# Patient Record
Sex: MALE | Race: WHITE | Employment: FULL TIME | URBAN - METROPOLITAN AREA
[De-identification: names, ages, dates, MRNs, and addresses within clinical notes are randomized per-mention and may not be internally consistent; named-entity substitution may affect disease eponyms.]

---

## 2018-04-05 PROBLEM — E66.01 OBESITY, MORBID (HCC): Status: ACTIVE | Noted: 2018-04-05

## 2018-09-19 ENCOUNTER — HOSPITAL ENCOUNTER (OUTPATIENT)
Dept: PHYSICAL THERAPY | Age: 58
Discharge: HOME | End: 2018-09-19
Payer: COMMERCIAL

## 2018-09-19 PROCEDURE — 97110 THERAPEUTIC EXERCISES: CPT | Performed by: PHYSICAL THERAPIST

## 2018-09-19 PROCEDURE — 97161 PT EVAL LOW COMPLEX 20 MIN: CPT | Performed by: PHYSICAL THERAPIST

## 2018-09-19 NOTE — PROGRESS NOTES
Ambulatory/Rehab Services H2 Model Falls Risk Assessment    Risk Factor Pts. ·   Confusion/Disorientation/Impulsivity  []    4 ·   Symptomatic Depression  []   2 ·   Altered Elimination  []   1 ·   Dizziness/Vertigo  []   1 ·   Gender (Male)  [x]   1 ·   Any administered antiepileptics (anticonvulsants):  []   2 ·   Any administered benzodiazepines:  []   1 ·   Visual Impairment (specify):  []   1 ·   Portable Oxygen Use  []   1 ·   Orthostatic ? BP  []   1 ·   History of Recent Falls (within 3 mos.)  []   5     Ability to Rise from Chair (choose one) Pts. ·   Ability to rise in a single movement  []   0 ·   Pushes up, successful in one attempt  [x]   1 ·   Multiple attempts, but successful  []   3 ·   Unable to rise without assistance  []   4   Total: (5 or greater = High Risk) 2     Falls Prevention Plan:   []                Physical Limitations to Exercise (specify):   []                Mobility Assistance Device (type):   []                Exercise/Equipment Adaptation (specify):    ©2010 [facility] [address] Patent #[id number].  Federal Law prohibits the replication, distribution or use without written permission from [facility] [employer]

## 2018-09-19 NOTE — THERAPY EVALUATION
Cas Carlin  : 1960  Primary: [address]  Secondary:  [facility] [place] [address], [address], [place] [address]  [phone]   [phone]         OUTPATIENT PHYSICAL THERAPY:Initial Assessment and Daily Note 2018    ICD-10: Treatment Diagnosis: (M75.02) L shoulder adhesive capsulitis; (M 25.512) L shoulder pain; (M25.612) L shoulder stiffness; (M62.81) muscle weakness  Precautions/Allergies:   Review of patient's allergies indicates no known allergies. Fall Risk Score: 2 (? 5 = High Risk)  MD Orders: Evaluate and Treat for L shoulder adhesive capsulitis MEDICAL/REFERRING DIAGNOSIS:  shoulder (M75.02)  DATE OF ONSET: 18  REFERRING PHYSICIAN: Murray Celis MD  RETURN PHYSICIAN APPOINTMENT: 10-18-18     INITIAL ASSESSMENT:  Mr. Katja Kruse presents today with s/s consistent with the diagnosis of L shoulder adhesive capsulitis including L shoulder/upper arm pain, as well as, decreased L shoulder P/AA/AROM and strength. Pt scored a 43% disability rating on the quick dash. Pt will benefit from skilled PT to address the following deficits. PROBLEM LIST (Impacting functional limitations):  1. Decreased Strength  2. Decreased ADL/Functional Activities  3. Increased Pain  4. Decreased Activity Tolerance  5. Decreased Flexibility/Joint Mobility  6. Decreased Knowledge of Precautions  7. Decreased [place] with Home Exercise Program INTERVENTIONS PLANNED:  1. Cold  2. Heat  3. Home Exercise Program (HEP)  4. Manual Therapy  5. Neuromuscular Re-education/Strengthening  6. Range of Motion (ROM)  7. Therapeutic Activites  8. Therapeutic Exercise/Strengthening  9. Ultrasound (US)  10. Kinesiotaping   TREATMENT PLAN:  Effective Dates: 2018 TO 2018 (60 days).  Frequency/Duration: 1 time a week for 60 Days  GOALS: (Goals have been discussed and agreed upon with patient.)  Short-Term Functional Goals: Time Frame: 4 weeks (10-17-18)  1. Pt will be compliant and independent with HEP. 2. Pt will improve Quick Dash score to <35 % disability to increase pt's overall functional mobility. 3. Pt to subjectively report a decrease in pain to 6/10 @ worst to increase pt's ease with ADLs. 4. Pt will improve standing AROM of L  shoulder to >Flex/Scapt: 130deg; ABD:110deg; ER in N:30deg; ER in ABD:50deg; and IR:L5/sacruum to improve pt's ability to reach behind back, overhead and perform ADLs such as dressing without modifications. 5. Pt will be able to sleep with decreased frequency (3-4x/night) of waking due to  L shoulder pain. 6. Pt will increase MMT to 3+/5 throughout L shoulder and 4+/5 for elbow in order to perform house hold and work related tasks with greater ease. Discharge Goals: Time Frame: 8 weeks (11-14-18)  1. Pt will improve Quick Dash score to < 30 % disability to increase pt's overall functional mobility. 2. Pt will subjectively report greater ease with tucking in his shirt and putting on a belt. 3. Pt will subjectively report a decrease in pain to 4-5/10 @ worst to allow pt greater ease with reaching overhead. 4. Pt will subjectively report waking only 2x/night due to L shoulder pain. 5. Pt will improve standing AROM of L shoulder to > Flex/Scapt: 135deg; ABD: 120deg; ER in N: 35deg; ER in ABD: 60deg and IR: L3 to increase pt's overall functional mobility. 6. Pt will increase MMT to 4/5 throughout L shoulder to allow pt to return to PLOF with manageable L shoulder pain. 7. Pt will be discharged from PT to HEP. Rehabilitation Potential For Stated Goals: Good  Regarding [facility] therapy, I certify that the treatment plan above will be carried out by a therapist or under their direction.   Thank you for this referral,  Keyana Tomlinson, PT     Referring Physician Signature: Frantz Wilkins MD              Date                    The information in this section was collected on 9/19/18 (except where otherwise noted). HISTORY:   History of Present Injury/Illness (Reason for Referral):  Pt reports ~ 6 weeks ago, he began having increased pain and stiffness in his L shoulder with no known cause. He reports waking 5-6x/night due to pain and throbbing. He states difficulty with ADLs such as bathing and dressing and house hold/work activities. He reports now his L UE feels weak. He reports pain at rest is a 2/10, @ night is a 3-4/10, and is a 6-7/10 at end range when tring to use his L UE. He reports taking Motrin/Aleve prn for pain. He denies having a steroid injection at this point. He would like to decrease his pain and improve his strength and mobility. Past Medical History/Comorbidities:   Mr. Loren Delcid  has a past medical history of Anxiety and depression; Awareness under anesthesia (1980s); Back pain; Bulging lumbar disc; Essential hypertension, benign; H/O colonoscopy (2018); High cholesterol; Morbid obesity ([facility] [address] (5/31/16); Murmur, cardiac; Other and unspecified hyperlipidemia; and Psychiatric disorder. Mr. Loren Delcid  has a past surgical history that includes hx hernia repair; hx hernia repair (Bilateral); hx orthopaedic (Right, 2002); hx orthopaedic; pr neurological procedure unlisted; and hx other surgical.  Social History/Living Environment:     Pt reports he lives with his wife in a 2rd story apartment. Prior Level of Function/Work/Activity:  Pt reports he is a , but states he's currently not working due to his L UE pain and ROM/strength restrictions. Dominant Side:         RIGHT  Current Medications:       Current Outpatient Prescriptions:     amLODIPine (NORVASC) 5 mg tablet, Take 1 Tab by mouth daily.  Indications: hypertension, Disp: 90 Tab, Rfl: 3    irbesartan (AVAPRO) 150 mg tablet, Take 1 Tab by mouth nightly., Disp: 90 Tab, Rfl: 3    ALPRAZolam (XANAX) 1 mg tablet, TAKE 1 TABLET BY MOUTH 3 TIMES A DAY, Disp: , Rfl: 0    ARIPiprazole (ABILIFY) 5 mg tablet, TAKE 1 TABLET BY MOUTH EVERY MORNING, Disp: , Rfl: 0    QUEtiapine (SEROQUEL) 50 mg tablet, TAKE 1 TO 2 TABLETS BY MOUTH AT BEDTIME, Disp: , Rfl: 0    escitalopram oxalate (LEXAPRO) 20 mg tablet, Take 1 Tab by mouth daily. , Disp: 90 Tab, Rfl: 3    aspirin delayed-release 81 mg tablet, Take 81 mg by mouth every morning. Last dose 5/31/16, Disp: , Rfl:    Date Last Reviewed:  9/19/18    Number of Personal Factors/Comorbidities that affect the Plan of Care:  0: LOW COMPLEXITY   EXAMINATION:   Observation/Orthostatic Postural Assessment:          Pt sits/stands with forward head and rounded shoulders. No obvious guarding noted until after AROM. Palpation:          Pt is minimally tender with palpation at anterior L shoulder at bicipital groove, but none noted with palpation along RC musculature.    Gait/Armswing: Normal and equal B  Atrophy: None noted    Standing AROM/Supine PROM:    Shoulder ROM  DATE  9/19/18 DATE     Flexion R:   L: 125deg R:   L:    Scaption R:  L:110deg R:  L:   ABD R:   L: 95deg R:   L:    ER (N / 90 ABD) R:   L: 25deg/45deg R:   L:    IR R:   L: L side iliac crest R:   L:    Flexibility PC R:   L: moderately restricted R:   L:      Strength Date:  9/19/18 Date:   Date:     Shoulder Parameters Parameters Parameters   Scapular Control (Ecc lowering) R:  L:within available ROM     Shoulder Flex/Scapt R:  L:3/5 in midrange     Shoulder ABD R:  L:3/5 in midrange     Shoulder ER R:  L:3/5 in midrange     Shoulder IR R:  L:3/5 in midrange     Elbow Flex R:  L:4/5     Elbow Ext R:  L:3+/5         Myotomes: Normal and equal B throughout  Diaphragm (C4):  Deltoid/Biceps (C5):  Wrist Extensors (C6):  Triceps (C7):  Flexor Profundus (C8):    Sensation: Normal and equal B throughout  Biceps (C5):  Palmar Radial (C6-C7):  Palmar Ulnar (C8-T1):    Special Test/Function:  Cervical Clearing: WFL; appropriate and equal throughout  Spurling's maneuver: negative B  Impingement Test: minimally positive L  Empty Can Test: positive due to pain and inability to get into appropriate position, rather than a true positive  Speeds Test: negative  Drop Arm Test:negative        Body Structures Involved:  1. Nerves  2. Bones  3. Joints  4. Muscles  5. Ligaments Body Functions Affected:  1. Sensory/Pain  2. Neuromusculoskeletal  3. Movement Related  Activities and Participation Affected:  1. Mobility  2. Self Care  3. Domestic Life  4. Interpersonal Interactions and Relationships  5. Community, Social and [place]  6. Work    Number of elements (examined above) that affect the Plan of Care:  3: MODERATE COMPLEXITY   CLINICAL PRESENTATION:    Presentation:  Stable and uncomplicated: LOW COMPLEXITY   CLINICAL DECISION MAKING:   Outcome Measure: Tool Used: Disabilities of the Arm, Shoulder and Hand (DASH) Questionnaire - Quick Version  Score:  Initial: 30/55=43% disability Most Recent: X/55 (Date: -- )   Interpretation of Score: The DASH is designed to measure the activities of daily living in person's with upper extremity dysfunction or pain. Each section is scored on a 1-5 scale, 5 representing the greatest disability. The scores of each section are added together for a total score of 55. Score 11 12-19 20-28 29-37 38-45 46-54 55   Modifier CH CI CJ CK CL CM CN        Medical Necessity:   · Patient is expected to demonstrate progress in strength, range of motion, balance, coordination and functional technique to decrease L shoulder/arm pain and allow him greater ease with ADLs/household/work activities. .     Use of outcome tool(s) and clinical judgement create a POC that gives a: Clear prediction of patient's progress: LOW COMPLEXITY            TREATMENT:   (In addition to Assessment/Re-Assessment sessions the following treatments were rendered)  Pre-treatment Symptoms/Complaints:  Pt c/o increased L shoulder/upper arm pain, stiffness, weakness and immobility.   Pain: Initial:     2-3/10 @ rest; 6-7/10 @ end AROM Post Session:  2-3/10 THERAPEUTIC EXERCISE: (25 minutes):  Exercises per grid below to improve mobility, strength, balance and coordination. Required moderate visual, verbal, manual and tactile cues to promote proper body alignment, promote proper body posture, promote proper body mechanics and promote proper body breathing techniques. Progressed resistance, range, repetitions and complexity of movement as indicated. MANUAL THERAPY: (0 minutes): Joint mobilization and Soft tissue mobilization was utilized and necessary because of the patient's restricted joint motion, painful spasm, loss of articular motion and restricted motion of soft tissue. Re-assessment was performed today (see above assessment section). Separate time was dedicated today for this re-assessment. 20 minutes   Date:  9/19/18 Date:   Date:     Activity/Exercise Parameters Parameters Parameters   Table slides into flex/scapt 10x 5sec hold     Table slides into ABD 10x 5 sec hold     Table slides into ER 10x 5sec hold     IR towel stretch across back 10x 5sec hold     IR towel stretch up back 10x 5sec hold           Education HEP/posture/  NSAIDS  Heat before  Ice after           Treatment/Session Assessment:  See above    · Response to Treatment:  Increased c/o pain at end range  · Compliance with Program/Exercises: Will assess as treatment progresses. Recommendations/Intent for next treatment session: \"Next visit will focus on advancements to more challenging activities and reduction in assistance provided\".     Future Appointments  Date Time Provider Hadley Myers   9/26/2018 10:30 AM Laura Clemente PT [facility]   10/3/2018 10:30 AM Laura Clemente PT [facility]   10/8/2018 4:30 PM Ashwin Fields MD [facility]   10/10/2018 10:30 AM Hero Joseph PT [facility]         Total Treatment Duration: 45 minutes  PT Patient Time In/Time Out  Time In: 0920  Time Out: 083 [facility],

## 2018-09-26 ENCOUNTER — HOSPITAL ENCOUNTER (OUTPATIENT)
Dept: PHYSICAL THERAPY | Age: 58
Discharge: HOME | End: 2018-09-26
Payer: COMMERCIAL

## 2018-09-26 PROCEDURE — 97110 THERAPEUTIC EXERCISES: CPT | Performed by: PHYSICAL THERAPIST

## 2018-09-26 NOTE — PROGRESS NOTES
Lico Naqvi  : 1960  Primary: [address]  Secondary:  [facility] [place] [address], [address], [place] [address]  [phone]   [phone]         OUTPATIENT PHYSICAL THERAPY:Daily Note 2018    ICD-10: Treatment Diagnosis: (M75.02) L shoulder adhesive capsulitis; (M 25.512) L shoulder pain; (M25.612) L shoulder stiffness; (M62.81) muscle weakness  Precautions/Allergies:   Review of patient's allergies indicates no known allergies. Fall Risk Score: 2 (? 5 = High Risk)  MD Orders: Evaluate and Treat for L shoulder adhesive capsulitis MEDICAL/REFERRING DIAGNOSIS:  shoulder (M75.02)  DATE OF ONSET: 18  REFERRING PHYSICIAN: Jessenia Freedman MD  RETURN PHYSICIAN APPOINTMENT: 10-18-18     INITIAL ASSESSMENT:  Mr. Lalo Reeves presents today with s/s consistent with the diagnosis of L shoulder adhesive capsulitis including L shoulder/upper arm pain, as well as, decreased L shoulder P/AA/AROM and strength. Pt scored a 43% disability rating on the quick dash. Pt will benefit from skilled PT to address the following deficits. PROBLEM LIST (Impacting functional limitations):  1. Decreased Strength  2. Decreased ADL/Functional Activities  3. Increased Pain  4. Decreased Activity Tolerance  5. Decreased Flexibility/Joint Mobility  6. Decreased Knowledge of Precautions  7. Decreased [place] with Home Exercise Program INTERVENTIONS PLANNED:  1. Cold  2. Heat  3. Home Exercise Program (HEP)  4. Manual Therapy  5. Neuromuscular Re-education/Strengthening  6. Range of Motion (ROM)  7. Therapeutic Activites  8. Therapeutic Exercise/Strengthening  9. Ultrasound (US)  10. Kinesiotaping   TREATMENT PLAN:  Effective Dates: 2018 TO 2018 (60 days). Frequency/Duration: 1 time a week for 60 Days  GOALS: (Goals have been discussed and agreed upon with patient.)  Short-Term Functional Goals: Time Frame: 4 weeks (10-17-18)  1.  Pt will be compliant and independent with HEP. 2. Pt will improve Quick Dash score to <35 % disability to increase pt's overall functional mobility. 3. Pt to subjectively report a decrease in pain to 6/10 @ worst to increase pt's ease with ADLs. 4. Pt will improve standing AROM of L  shoulder to >Flex/Scapt: 130deg; ABD:110deg; ER in N:30deg; ER in ABD:50deg; and IR:L5/sacruum to improve pt's ability to reach behind back, overhead and perform ADLs such as dressing without modifications. 5. Pt will be able to sleep with decreased frequency (3-4x/night) of waking due to  L shoulder pain. 6. Pt will increase MMT to 3+/5 throughout L shoulder and 4+/5 for elbow in order to perform house hold and work related tasks with greater ease. Discharge Goals: Time Frame: 8 weeks (11-14-18)  1. Pt will improve Quick Dash score to < 30 % disability to increase pt's overall functional mobility. 2. Pt will subjectively report greater ease with tucking in his shirt and putting on a belt. 3. Pt will subjectively report a decrease in pain to 4-5/10 @ worst to allow pt greater ease with reaching overhead. 4. Pt will subjectively report waking only 2x/night due to L shoulder pain. 5. Pt will improve standing AROM of L shoulder to > Flex/Scapt: 135deg; ABD: 120deg; ER in N: 35deg; ER in ABD: 60deg and IR: L3 to increase pt's overall functional mobility. 6. Pt will increase MMT to 4/5 throughout L shoulder to allow pt to return to PLOF with manageable L shoulder pain. 7. Pt will be discharged from PT to HEP. Rehabilitation Potential For Stated Goals: Good  Regarding Matt Taylorshweta therapy, I certify that the treatment plan above will be carried out by a therapist or under their direction. Thank you for this referral,  Dominick Ding PT                 The information in this section was collected on 9/19/18 (except where otherwise noted).   HISTORY:   History of Present Injury/Illness (Reason for Referral):  Pt reports ~ 6 weeks ago, he began having increased pain and stiffness in his L shoulder with no known cause. He reports waking 5-6x/night due to pain and throbbing. He states difficulty with ADLs such as bathing and dressing and house hold/work activities. He reports now his L UE feels weak. He reports pain at rest is a 2/10, @ night is a 3-4/10, and is a 6-7/10 at end range when tring to use his L UE. He reports taking Motrin/Aleve prn for pain. He denies having a steroid injection at this point. He would like to decrease his pain and improve his strength and mobility. Past Medical History/Comorbidities:   Mr. Nahomi Wesley  has a past medical history of Anxiety and depression; Awareness under anesthesia (1980s); Back pain; Bulging lumbar disc; Essential hypertension, benign; H/O colonoscopy (2018); High cholesterol; Morbid obesity ([facility] [address] (5/31/16); Murmur, cardiac; Other and unspecified hyperlipidemia; and Psychiatric disorder. Mr. Nahomi Wesley  has a past surgical history that includes hx hernia repair; hx hernia repair (Bilateral); hx orthopaedic (Right, 2002); hx orthopaedic; pr neurological procedure unlisted; and hx other surgical.  Social History/Living Environment:     Pt reports he lives with his wife in a 2rd story apartment. Prior Level of Function/Work/Activity:  Pt reports he is a , but states he's currently not working due to his L UE pain and ROM/strength restrictions. Dominant Side:         RIGHT  Current Medications:       Current Outpatient Prescriptions:     amLODIPine (NORVASC) 5 mg tablet, Take 1 Tab by mouth daily.  Indications: hypertension, Disp: 90 Tab, Rfl: 3    irbesartan (AVAPRO) 150 mg tablet, Take 1 Tab by mouth nightly., Disp: 90 Tab, Rfl: 3    ALPRAZolam (XANAX) 1 mg tablet, TAKE 1 TABLET BY MOUTH 3 TIMES A DAY, Disp: , Rfl: 0    ARIPiprazole (ABILIFY) 5 mg tablet, TAKE 1 TABLET BY MOUTH EVERY MORNING, Disp: , Rfl: 0    QUEtiapine (SEROQUEL) 50 mg tablet, TAKE 1 TO 2 TABLETS BY MOUTH AT BEDTIME, Disp: , Rfl: 0    escitalopram oxalate (LEXAPRO) 20 mg tablet, Take 1 Tab by mouth daily. , Disp: 90 Tab, Rfl: 3    aspirin delayed-release 81 mg tablet, Take 81 mg by mouth every morning. Last dose 5/31/16, Disp: , Rfl:    Date Last Reviewed:  9/19/18   EXAMINATION:   Observation/Orthostatic Postural Assessment:          Pt sits/stands with forward head and rounded shoulders. No obvious guarding noted until after AROM. Palpation:          Pt is minimally tender with palpation at anterior L shoulder at bicipital groove, but none noted with palpation along RC musculature.    Gait/Armswing: Normal and equal B  Atrophy: None noted    Standing AROM/Supine PROM:    Shoulder ROM  DATE  9/19/18 DATE     Flexion R:   L: 125deg R:   L:    Scaption R:  L:110deg R:  L:   ABD R:   L: 95deg R:   L:    ER (N / 90 ABD) R:   L: 25deg/45deg R:   L:    IR R:   L: L side iliac crest R:   L:    Flexibility PC R:   L: moderately restricted R:   L:      Strength Date:  9/19/18 Date:   Date:     Shoulder Parameters Parameters Parameters   Scapular Control (Ecc lowering) R:  L:within available ROM     Shoulder Flex/Scapt R:  L:3/5 in midrange     Shoulder ABD R:  L:3/5 in midrange     Shoulder ER R:  L:3/5 in midrange     Shoulder IR R:  L:3/5 in midrange     Elbow Flex R:  L:4/5     Elbow Ext R:  L:3+/5         Myotomes: Normal and equal B throughout  Diaphragm (C4):  Deltoid/Biceps (C5):  Wrist Extensors (C6):  Triceps (C7):  Flexor Profundus (C8):    Sensation: Normal and equal B throughout  Biceps (C5):  Palmar Radial (C6-C7):  Palmar Ulnar (C8-T1):    Special Test/Function:  Cervical Clearing: WFL; appropriate and equal throughout  Spurling's maneuver: negative B  Impingement Test: minimally positive L  Empty Can Test: positive due to pain and inability to get into appropriate position, rather than a true positive  Speeds Test: negative  Drop Arm Test:negative        Body Structures Involved:  1. Nerves  2. Bones  3. Joints  4. Muscles  5. Ligaments Body Functions Affected:  1. Sensory/Pain  2. Neuromusculoskeletal  3. Movement Related  Activities and Participation Affected:  1. Mobility  2. Self Care  3. Domestic Life  4. Interpersonal Interactions and Relationships  5. Community, Social and [place]  6. Work   CLINICAL PRESENTATION:   CLINICAL DECISION MAKING:   Outcome Measure: Tool Used: Disabilities of the Arm, Shoulder and Hand (DASH) Questionnaire - Quick Version  Score:  Initial: 30/55=43% disability Most Recent: X/55 (Date: -- )   Interpretation of Score: The DASH is designed to measure the activities of daily living in person's with upper extremity dysfunction or pain. Each section is scored on a 1-5 scale, 5 representing the greatest disability. The scores of each section are added together for a total score of 55. Score 11 12-19 20-28 29-37 38-45 46-54 55   Modifier CH CI CJ CK CL CM CN        Medical Necessity:   · Patient is expected to demonstrate progress in strength, range of motion, balance, coordination and functional technique to decrease L shoulder/arm pain and allow him greater ease with ADLs/household/work activities. .            TREATMENT:   (In addition to Assessment/Re-Assessment sessions the following treatments were rendered)  Pre-treatment Symptoms/Complaints:  Pt reports L shoulder/arm pain and stiffness continues, however, states he does have more mobility now that he's been doing exercises for a week. He reports night pain continues, making sleeping difficult. Pain: Initial:     2/10 @ rest; 6/10 @ end AROM Post Session:  3/10     THERAPEUTIC EXERCISE: (50 minutes):  Exercises per grid below to improve mobility, strength, balance and coordination. Required moderate visual, verbal, manual and tactile cues to promote proper body alignment, promote proper body posture, promote proper body mechanics and promote proper body breathing techniques. Progressed resistance, range, repetitions and complexity of movement as indicated. MANUAL THERAPY: (0 minutes): Joint mobilization and Soft tissue mobilization was utilized and necessary because of the patient's restricted joint motion, painful spasm, loss of articular motion and restricted motion of soft tissue. Date:  9/19/18 Date:  9/26/18 Date:     Activity/Exercise Parameters Parameters Parameters   Table slides into flex/scapt 10x 5sec hold Review    Table slides into ABD 10x 5 sec hold Review    Table slides into ER 10x 5sec hold Review    IR towel stretch across back 10x 5sec hold Review    IR towel stretch up back 10x 5sec hold Review                      Stdg wand ext  1#, 20x    Stdg wand ABD  1#, 20x    Stdg wand flex  1#, 20x    Seated pulleys into ABD  20x    Seated pulleys into flex/scapt  20x each    Stdg pulleys into IR  20x    stdg pulleys into flex  20x    UBE  Res 2, 8min (4/4)    Education HEP/posture/  NSAIDS  Heat before  Ice after HEP/posture  NSAIDS          Treatment/Session Assessment: Pt's L shoulder P/AA/AROM is slowly improving. Pt continues to have a painful arc at 90deg flex/scapt/abd, but capsular mobility is improving. · Response to Treatment:  Pt with increased L shoulder/ and upper arm soreness during and after exercises, however, he was able to participate fully despite the pain. · Compliance with Program/Exercises: Pt reports compliance with HEP, ICE and NSAIDS 2x daily. Recommendations/Intent for next treatment session: \"Next visit will focus on advancements to more challenging activities and reduction in assistance provided\". Try RC/deltoid strengthening next visit per MD order.     Future Appointments  Date Time Provider Hadley Myers   10/3/2018 10:30 AM JASMINA Natar[facility]   10/8/2018 4:30 PM MD NETTA Jaramillo [facility]   10/10/2018 10:30 AM Faraz Duncan PT [facility]         Total Treatment Duration: 50 minutes  PT Patient Time In/Time Out  Time In: 1030  Time Out: 1125 Nimisha Oneill, PT

## 2018-10-03 ENCOUNTER — HOSPITAL ENCOUNTER (OUTPATIENT)
Dept: PHYSICAL THERAPY | Age: 58
Discharge: HOME | End: 2018-10-03
Payer: COMMERCIAL

## 2018-10-03 PROCEDURE — 97110 THERAPEUTIC EXERCISES: CPT | Performed by: PHYSICAL THERAPIST

## 2018-10-03 NOTE — PROGRESS NOTES
Nancie Jaramillo  : 1960  Primary: [address]  Secondary:  [facility] [address] [place] [address], [address], [place] [address]  [phone]   [phone]         OUTPATIENT PHYSICAL THERAPY:Daily Note 10/3/2018    ICD-10: Treatment Diagnosis: (M75.02) L shoulder adhesive capsulitis; (M 25.512) L shoulder pain; (M25.612) L shoulder stiffness; (M62.81) muscle weakness  Precautions/Allergies:   Review of patient's allergies indicates no known allergies. Fall Risk Score: 2 (? 5 = High Risk)  MD Orders: Evaluate and Treat for L shoulder adhesive capsulitis MEDICAL/REFERRING DIAGNOSIS:  shoulder (M75.02)  DATE OF ONSET: 18  REFERRING PHYSICIAN: Nando Mg MD  RETURN PHYSICIAN APPOINTMENT: 10-18-18     INITIAL ASSESSMENT:  Mr. Adalgisa Antoine presents today with s/s consistent with the diagnosis of L shoulder adhesive capsulitis including L shoulder/upper arm pain, as well as, decreased L shoulder P/AA/AROM and strength. Pt scored a 43% disability rating on the quick dash. Pt will benefit from skilled PT to address the following deficits. PROBLEM LIST (Impacting functional limitations):  1. Decreased Strength  2. Decreased ADL/Functional Activities  3. Increased Pain  4. Decreased Activity Tolerance  5. Decreased Flexibility/Joint Mobility  6. Decreased Knowledge of Precautions  7. Decreased [place] with Home Exercise Program INTERVENTIONS PLANNED:  1. Cold  2. Heat  3. Home Exercise Program (HEP)  4. Manual Therapy  5. Neuromuscular Re-education/Strengthening  6. Range of Motion (ROM)  7. Therapeutic Activites  8. Therapeutic Exercise/Strengthening  9. Ultrasound (US)  10. Kinesiotaping   TREATMENT PLAN:  Effective Dates: 2018 TO 2018 (60 days). Frequency/Duration: 1 time a week for 60 Days  GOALS: (Goals have been discussed and agreed upon with patient.)  Short-Term Functional Goals: Time Frame: 4 weeks (10-17-18)  1.  Pt will be compliant and independent with HEP. 2. Pt will improve Quick Dash score to <35 % disability to increase pt's overall functional mobility. 3. Pt to subjectively report a decrease in pain to 6/10 @ worst to increase pt's ease with ADLs. 4. Pt will improve standing AROM of L  shoulder to >Flex/Scapt: 130deg; ABD:110deg; ER in N:30deg; ER in ABD:50deg; and IR:L5/sacruum to improve pt's ability to reach behind back, overhead and perform ADLs such as dressing without modifications. 5. Pt will be able to sleep with decreased frequency (3-4x/night) of waking due to  L shoulder pain. 6. Pt will increase MMT to 3+/5 throughout L shoulder and 4+/5 for elbow in order to perform house hold and work related tasks with greater ease. Discharge Goals: Time Frame: 8 weeks (11-14-18)  1. Pt will improve Quick Dash score to < 30 % disability to increase pt's overall functional mobility. 2. Pt will subjectively report greater ease with tucking in his shirt and putting on a belt. 3. Pt will subjectively report a decrease in pain to 4-5/10 @ worst to allow pt greater ease with reaching overhead. 4. Pt will subjectively report waking only 2x/night due to L shoulder pain. 5. Pt will improve standing AROM of L shoulder to > Flex/Scapt: 135deg; ABD: 120deg; ER in N: 35deg; ER in ABD: 60deg and IR: L3 to increase pt's overall functional mobility. 6. Pt will increase MMT to 4/5 throughout L shoulder to allow pt to return to PLOF with manageable L shoulder pain. 7. Pt will be discharged from PT to HEP. Rehabilitation Potential For Stated Goals: Good  Regarding Viviana Coupe therapy, I certify that the treatment plan above will be carried out by a therapist or under their direction. Thank you for this referral,  Darnell Faith PT                 The information in this section was collected on 9/19/18 (except where otherwise noted).   HISTORY:   History of Present Injury/Illness (Reason for Referral):  Pt reports ~ 6 weeks ago, he began having increased pain and stiffness in his L shoulder with no known cause. He reports waking 5-6x/night due to pain and throbbing. He states difficulty with ADLs such as bathing and dressing and house hold/work activities. He reports now his L UE feels weak. He reports pain at rest is a 2/10, @ night is a 3-4/10, and is a 6-7/10 at end range when tring to use his L UE. He reports taking Motrin/Aleve prn for pain. He denies having a steroid injection at this point. He would like to decrease his pain and improve his strength and mobility. Past Medical History/Comorbidities:   Mr. Antonio Jay  has a past medical history of Anxiety and depression; Awareness under anesthesia (1980s); Back pain; Bulging lumbar disc; Essential hypertension, benign; H/O colonoscopy (2018); High cholesterol; Morbid obesity ([facility] [address] (5/31/16); Murmur, cardiac; Other and unspecified hyperlipidemia; and Psychiatric disorder. Mr. Antonio Jay  has a past surgical history that includes hx hernia repair; hx hernia repair (Bilateral); hx orthopaedic (Right, 2002); hx orthopaedic; pr neurological procedure unlisted; and hx other surgical.  Social History/Living Environment:     Pt reports he lives with his wife in a 2rd story apartment. Prior Level of Function/Work/Activity:  Pt reports he is a , but states he's currently not working due to his L UE pain and ROM/strength restrictions. Dominant Side:         RIGHT  Current Medications:       Current Outpatient Prescriptions:     amLODIPine (NORVASC) 5 mg tablet, Take 1 Tab by mouth daily.  Indications: hypertension, Disp: 90 Tab, Rfl: 3    irbesartan (AVAPRO) 150 mg tablet, Take 1 Tab by mouth nightly., Disp: 90 Tab, Rfl: 3    ALPRAZolam (XANAX) 1 mg tablet, TAKE 1 TABLET BY MOUTH 3 TIMES A DAY, Disp: , Rfl: 0    ARIPiprazole (ABILIFY) 5 mg tablet, TAKE 1 TABLET BY MOUTH EVERY MORNING, Disp: , Rfl: 0    QUEtiapine (SEROQUEL) 50 mg tablet, TAKE 1 TO 2 TABLETS BY MOUTH AT BEDTIME, Disp: , Rfl: 0    escitalopram oxalate (LEXAPRO) 20 mg tablet, Take 1 Tab by mouth daily. , Disp: 90 Tab, Rfl: 3    aspirin delayed-release 81 mg tablet, Take 81 mg by mouth every morning. Last dose 5/31/16, Disp: , Rfl:    Date Last Reviewed:  9/19/18   EXAMINATION:   Observation/Orthostatic Postural Assessment:          Pt sits/stands with forward head and rounded shoulders. No obvious guarding noted until after AROM. Palpation:          Pt is minimally tender with palpation at anterior L shoulder at bicipital groove, but none noted with palpation along RC musculature.    Gait/Armswing: Normal and equal B  Atrophy: None noted    Standing AROM/Supine PROM:    Shoulder ROM  DATE  9/19/18 DATE     Flexion R:   L: 125deg R:   L:    Scaption R:  L:110deg R:  L:   ABD R:   L: 95deg R:   L:    ER (N / 90 ABD) R:   L: 25deg/45deg R:   L:    IR R:   L: L side iliac crest R:   L:    Flexibility PC R:   L: moderately restricted R:   L:      Strength Date:  9/19/18 Date:   Date:     Shoulder Parameters Parameters Parameters   Scapular Control (Ecc lowering) R:  L:within available ROM     Shoulder Flex/Scapt R:  L:3/5 in midrange     Shoulder ABD R:  L:3/5 in midrange     Shoulder ER R:  L:3/5 in midrange     Shoulder IR R:  L:3/5 in midrange     Elbow Flex R:  L:4/5     Elbow Ext R:  L:3+/5         Myotomes: Normal and equal B throughout  Diaphragm (C4):  Deltoid/Biceps (C5):  Wrist Extensors (C6):  Triceps (C7):  Flexor Profundus (C8):    Sensation: Normal and equal B throughout  Biceps (C5):  Palmar Radial (C6-C7):  Palmar Ulnar (C8-T1):    Special Test/Function:  Cervical Clearing: WFL; appropriate and equal throughout  Spurling's maneuver: negative B  Impingement Test: minimally positive L  Empty Can Test: positive due to pain and inability to get into appropriate position, rather than a true positive  Speeds Test: negative  Drop Arm Test:negative        Body Structures Involved:  1. Nerves  2. Bones  3. Joints  4. Muscles  5. Ligaments Body Functions Affected:  1. Sensory/Pain  2. Neuromusculoskeletal  3. Movement Related  Activities and Participation Affected:  1. Mobility  2. Self Care  3. Domestic Life  4. Interpersonal Interactions and Relationships  5. Community, Social and [place]  6. Work   CLINICAL PRESENTATION:   CLINICAL DECISION MAKING:   Outcome Measure: Tool Used: Disabilities of the Arm, Shoulder and Hand (DASH) Questionnaire - Quick Version  Score:  Initial: 30/55=43% disability Most Recent: X/55 (Date: -- )   Interpretation of Score: The DASH is designed to measure the activities of daily living in person's with upper extremity dysfunction or pain. Each section is scored on a 1-5 scale, 5 representing the greatest disability. The scores of each section are added together for a total score of 55. Score 11 12-19 20-28 29-37 38-45 46-54 55   Modifier CH CI CJ CK CL CM CN        Medical Necessity:   · Patient is expected to demonstrate progress in strength, range of motion, balance, coordination and functional technique to decrease L shoulder/arm pain and allow him greater ease with ADLs/household/work activities. .            TREATMENT:   (In addition to Assessment/Re-Assessment sessions the following treatments were rendered)  Pre-treatment Symptoms/Complaints:  Pt reports increased L shoulder soreness/pain today. He voiced frustration of continued L shoulder pain and his lack of sleep. Pain: Initial:     2-3/10 @ rest; 6-7/10 @ end AROM Post Session:  3/10     THERAPEUTIC EXERCISE: (40 minutes):  Exercises per grid below to improve mobility, strength, balance and coordination. Required moderate visual, verbal, manual and tactile cues to promote proper body alignment, promote proper body posture, promote proper body mechanics and promote proper body breathing techniques.   Progressed resistance, range, repetitions and complexity of movement as indicated. MANUAL THERAPY: (0 minutes): Joint mobilization and Soft tissue mobilization was utilized and necessary because of the patient's restricted joint motion, painful spasm, loss of articular motion and restricted motion of soft tissue. Date:  9/19/18 Date:  9/26/18 Date:  10/3/18   Activity/Exercise Parameters Parameters Parameters   Table slides into flex/scapt 10x 5sec hold Review    Table slides into ABD 10x 5 sec hold Review    Table slides into ER 10x 5sec hold Review    IR towel stretch across back 10x 5sec hold Review    IR towel stretch up back 10x 5sec hold Review    Stdg B shoulder extension   GTB, 20x   Stdg B rowing   GTB, 20x   Stdg B horizontal ABD   GTB, 20x    Stdg ER   GTB, 20x   Stdg IR   GTB, 20x         Stdg wand ext  1#, 20x Review   Stdg wand ABD  1#, 20x Review   Stdg wand flex  1#, 20x Review   Seated pulleys into ABD  20x 30x   Seated pulleys into flex/scapt  20x each 30x each   Stdg pulleys into IR  20x 20x   stdg pulleys into flex  20x    UBE  Res 2, 8min (4/4) Res 4, 8min  (4/4)   Education HEP/posture/  NSAIDS  Heat before  Ice after HEP/posture  NSAIDS HEP/posture  NSAIDS         Treatment/Session Assessment: Pt's L shoulder P/AA/AROM and strength continues to slowly improve. His posterior capsule remains restricted and painful. Pt was challenged by tband horizontal abduction and IR/ER due to RC weakness and L shoulder inflammation. Pt is slowly progressing toward current goals. · Response to Treatment:  Pt with increased L shoulder/ and upper arm soreness during and after exercises, however, he was able to participate fully despite the pain. · Compliance with Program/Exercises: Pt reports compliance with HEP, ICE and NSAIDS 2x daily. · Recommendations/Intent for next treatment session: \"Next visit will focus on advancements to more challenging activities and reduction in assistance provided\".  Review HEP and progress       stretching and RC strengthening as pt is able.     Future Appointments  Date Time Provider Hadley Lucia   10/8/2018 4:30 PM Jasmeet Lal MD [facility]   10/10/2018 10:30 AM Wicho Kirkland PT [facility]         Total Treatment Duration: 40 minutes  PT Patient Time In/Time Out  Time In: 1040  Time Out: 1125 Nimisha Oneill, PT

## 2018-10-10 ENCOUNTER — HOSPITAL ENCOUNTER (OUTPATIENT)
Dept: PHYSICAL THERAPY | Age: 58
Discharge: HOME | End: 2018-10-10
Payer: COMMERCIAL

## 2018-10-10 PROCEDURE — 97110 THERAPEUTIC EXERCISES: CPT

## 2018-10-10 NOTE — PROGRESS NOTES
Caty Ha  : 1960  Primary: [address]  Secondary:  [facility] [address], [address], [place] [address]  [phone]   [phone]         OUTPATIENT PHYSICAL THERAPY:Daily Note 10/10/2018    ICD-10: Treatment Diagnosis: (M75.02) L shoulder adhesive capsulitis; (M 25.512) L shoulder pain; (M25.612) L shoulder stiffness; (M62.81) muscle weakness  Precautions/Allergies:   Review of patient's allergies indicates no known allergies. Fall Risk Score: 2 (? 5 = High Risk)  MD Orders: Evaluate and Treat for L shoulder adhesive capsulitis MEDICAL/REFERRING DIAGNOSIS:  shoulder (M75.02)  DATE OF ONSET: 18  REFERRING PHYSICIAN: Janny Ramirez MD  RETURN PHYSICIAN APPOINTMENT: 10-18-18     INITIAL ASSESSMENT:  Mr. Susannah Stevenson presents today with s/s consistent with the diagnosis of L shoulder adhesive capsulitis including L shoulder/upper arm pain, as well as, decreased L shoulder P/AA/AROM and strength. Pt scored a 43% disability rating on the quick dash. Pt will benefit from skilled PT to address the following deficits. PROBLEM LIST (Impacting functional limitations):  1. Decreased Strength  2. Decreased ADL/Functional Activities  3. Increased Pain  4. Decreased Activity Tolerance  5. Decreased Flexibility/Joint Mobility  6. Decreased Knowledge of Precautions  7. Decreased [place] with Home Exercise Program INTERVENTIONS PLANNED:  1. Cold  2. Heat  3. Home Exercise Program (HEP)  4. Manual Therapy  5. Neuromuscular Re-education/Strengthening  6. Range of Motion (ROM)  7. Therapeutic Activites  8. Therapeutic Exercise/Strengthening  9. Ultrasound (US)  10. Kinesiotaping   TREATMENT PLAN:  Effective Dates: 2018 TO 2018 (60 days). Frequency/Duration: 1 time a week for 60 Days  GOALS: (Goals have been discussed and agreed upon with patient.)  Short-Term Functional Goals: Time Frame: 4 weeks (10-17-18)  1.  Pt will be compliant and independent with HEP. 2. Pt will improve Quick Dash score to <35 % disability to increase pt's overall functional mobility. 3. Pt to subjectively report a decrease in pain to 6/10 @ worst to increase pt's ease with ADLs. 4. Pt will improve standing AROM of L  shoulder to >Flex/Scapt: 130deg; ABD:110deg; ER in N:30deg; ER in ABD:50deg; and IR:L5/sacruum to improve pt's ability to reach behind back, overhead and perform ADLs such as dressing without modifications. 5. Pt will be able to sleep with decreased frequency (3-4x/night) of waking due to  L shoulder pain. 6. Pt will increase MMT to 3+/5 throughout L shoulder and 4+/5 for elbow in order to perform house hold and work related tasks with greater ease. Discharge Goals: Time Frame: 8 weeks (11-14-18)  1. Pt will improve Quick Dash score to < 30 % disability to increase pt's overall functional mobility. 2. Pt will subjectively report greater ease with tucking in his shirt and putting on a belt. 3. Pt will subjectively report a decrease in pain to 4-5/10 @ worst to allow pt greater ease with reaching overhead. 4. Pt will subjectively report waking only 2x/night due to L shoulder pain. 5. Pt will improve standing AROM of L shoulder to > Flex/Scapt: 135deg; ABD: 120deg; ER in N: 35deg; ER in ABD: 60deg and IR: L3 to increase pt's overall functional mobility. 6. Pt will increase MMT to 4/5 throughout L shoulder to allow pt to return to PLOF with manageable L shoulder pain. 7. Pt will be discharged from PT to HEP. Rehabilitation Potential For Stated Goals: Good  Regarding Viviana Coupe therapy, I certify that the treatment plan above will be carried out by a therapist or under their direction. Thank you for this referral,  Chi Swenson PT                 The information in this section was collected on 9/19/18 (except where otherwise noted).   HISTORY:   History of Present Injury/Illness (Reason for Referral):  Pt reports ~ 6 weeks ago, he began having increased pain and stiffness in his L shoulder with no known cause. He reports waking 5-6x/night due to pain and throbbing. He states difficulty with ADLs such as bathing and dressing and house hold/work activities. He reports now his L UE feels weak. He reports pain at rest is a 2/10, @ night is a 3-4/10, and is a 6-7/10 at end range when tring to use his L UE. He reports taking Motrin/Aleve prn for pain. He denies having a steroid injection at this point. He would like to decrease his pain and improve his strength and mobility. Past Medical History/Comorbidities:   Mr. Angela Stuart  has a past medical history of Anxiety and depression; Awareness under anesthesia (1980s); Back pain; Bulging lumbar disc; Essential hypertension, benign; H/O colonoscopy (2018); High cholesterol; Morbid obesity ([facility] [address] (5/31/16); Murmur, cardiac; Other and unspecified hyperlipidemia; and Psychiatric disorder. Mr. Angela Stuart  has a past surgical history that includes hx hernia repair; hx hernia repair (Bilateral); hx orthopaedic (Right, 2002); hx orthopaedic; pr neurological procedure unlisted; and hx other surgical.  Social History/Living Environment:     Pt reports he lives with his wife in a 2rd story apartment. Prior Level of Function/Work/Activity:  Pt reports he is a , but states he's currently not working due to his L UE pain and ROM/strength restrictions. Dominant Side:         RIGHT  Current Medications:       Current Outpatient Prescriptions:     amLODIPine (NORVASC) 5 mg tablet, Take 1 Tab by mouth daily.  Indications: hypertension, Disp: 90 Tab, Rfl: 3    irbesartan (AVAPRO) 150 mg tablet, Take 1 Tab by mouth nightly., Disp: 90 Tab, Rfl: 3    ALPRAZolam (XANAX) 1 mg tablet, TAKE 1 TABLET BY MOUTH 3 TIMES A DAY, Disp: , Rfl: 0    ARIPiprazole (ABILIFY) 5 mg tablet, TAKE 1 TABLET BY MOUTH EVERY MORNING, Disp: , Rfl: 0    QUEtiapine (SEROQUEL) 50 mg tablet, TAKE 1 TO 2 TABLETS BY MOUTH AT BEDTIME, Disp: , Rfl: 0    escitalopram oxalate (LEXAPRO) 20 mg tablet, Take 1 Tab by mouth daily. , Disp: 90 Tab, Rfl: 3    aspirin delayed-release 81 mg tablet, Take 81 mg by mouth every morning. Last dose 5/31/16, Disp: , Rfl:    Date Last Reviewed:  9/19/18   EXAMINATION:   Observation/Orthostatic Postural Assessment:          Pt sits/stands with forward head and rounded shoulders. No obvious guarding noted until after AROM. Palpation:          Pt is minimally tender with palpation at anterior L shoulder at bicipital groove, but none noted with palpation along RC musculature.    Gait/Armswing: Normal and equal B  Atrophy: None noted    Standing AROM/Supine PROM:    Shoulder ROM  DATE  9/19/18 DATE     Flexion R:   L: 125deg R:   L:    Scaption R:  L:110deg R:  L:   ABD R:   L: 95deg R:   L:    ER (N / 90 ABD) R:   L: 25deg/45deg R:   L:    IR R:   L: L side iliac crest R:   L:    Flexibility PC R:   L: moderately restricted R:   L:      Strength Date:  9/19/18 Date:   Date:     Shoulder Parameters Parameters Parameters   Scapular Control (Ecc lowering) R:  L:within available ROM     Shoulder Flex/Scapt R:  L:3/5 in midrange     Shoulder ABD R:  L:3/5 in midrange     Shoulder ER R:  L:3/5 in midrange     Shoulder IR R:  L:3/5 in midrange     Elbow Flex R:  L:4/5     Elbow Ext R:  L:3+/5         Myotomes: Normal and equal B throughout  Diaphragm (C4):  Deltoid/Biceps (C5):  Wrist Extensors (C6):  Triceps (C7):  Flexor Profundus (C8):    Sensation: Normal and equal B throughout  Biceps (C5):  Palmar Radial (C6-C7):  Palmar Ulnar (C8-T1):    Special Test/Function:  Cervical Clearing: WFL; appropriate and equal throughout  Spurling's maneuver: negative B  Impingement Test: minimally positive L  Empty Can Test: positive due to pain and inability to get into appropriate position, rather than a true positive  Speeds Test: negative  Drop Arm Test:negative        Body Structures Involved:  1. Nerves  2. Bones  3. Joints  4. Muscles  5. Ligaments Body Functions Affected:  1. Sensory/Pain  2. Neuromusculoskeletal  3. Movement Related  Activities and Participation Affected:  1. Mobility  2. Self Care  3. Domestic Life  4. Interpersonal Interactions and Relationships  5. Community, Social and [place] [employer]  6. Work   CLINICAL PRESENTATION:   CLINICAL DECISION MAKING:   Outcome Measure: Tool Used: Disabilities of the Arm, Shoulder and Hand (DASH) Questionnaire - Quick Version  Score:  Initial: 30/55=43% disability Most Recent: X/55 (Date: -- )   Interpretation of Score: The DASH is designed to measure the activities of daily living in person's with upper extremity dysfunction or pain. Each section is scored on a 1-5 scale, 5 representing the greatest disability. The scores of each section are added together for a total score of 55. Score 11 12-19 20-28 29-37 38-45 46-54 55   Modifier CH CI CJ CK CL CM CN        Medical Necessity:   · Patient is expected to demonstrate progress in strength, range of motion, balance, coordination and functional technique to decrease L shoulder/arm pain and allow him greater ease with ADLs/household/work activities. .            TREATMENT:   (In addition to Assessment/Re-Assessment sessions the following treatments were rendered)  Pre-treatment Symptoms/Complaints:  Pt reports when he does his HEP he feels a little more loose. Pain: Initial:     6-7/10 when moves it  Post Session:  7/10     THERAPEUTIC EXERCISE: (43 minutes):  Exercises per grid below to improve mobility, strength, balance and coordination. Required moderate visual, verbal, manual and tactile cues to promote proper body alignment, promote proper body posture, promote proper body mechanics and promote proper body breathing techniques. Progressed resistance, range, repetitions and complexity of movement as indicated.   MANUAL THERAPY: (0 minutes): Joint mobilization and Soft tissue mobilization was utilized and necessary because of the patient's restricted joint motion, painful spasm, loss of articular motion and restricted motion of soft tissue. Date:  9/19/18 Date:  9/26/18 Date:  10/3/18 Date:  10/10/18   Activity/Exercise Parameters Parameters Parameters Parameters   Table slides into flex/scapt 10x 5sec hold Review     Table slides into ABD 10x 5 sec hold Review     Table slides into ER 10x 5sec hold Review     IR towel stretch across back 10x 5sec hold Review     IR towel stretch up back 10x 5sec hold Review     Stdg B shoulder extension   GTB, 20x GTB 20x B   Stdg B rowing   GTB, 20x GTB 20x B   Stdg B horizontal ABD   GTB, 20x  GTB 20x B   Stdg ER   GTB, 20x GTB 20x B   Stdg IR   GTB, 20x GTB 20x B          Stdg wand ext  1#, 20x Review 1# 20x   Stdg wand ABD  1#, 20x Review 1# 20x   Stdg wand flex  1#, 20x Review 1# 20x   Seated pulleys into ABD  20x 30x 30x   Seated pulleys into flex/scapt  20x each 30x each 30x each   Stdg pulleys into IR  20x 20x 20x   stdg pulleys into flex  20x     UBE  Res 2, 8min (4/4) Res 4, 8min  (4/4) 4/4, 4.0    Education HEP/posture/  NSAIDS  Heat before  Ice after HEP/posture  NSAIDS HEP/posture  NSAIDS    ER standing stretch w/ ball    20x 1# bar         Treatment/Session Assessment: Pt is slowly progressing toward current goals. He goes back to his MD next week to see if he needs more PT visits. He has a comprehensive HEP and was instructed to continued performing it at home. · Response to Treatment:  Pt with increased L shoulder/ and upper arm soreness during and after exercises, however, he was able to participate fully despite the pain. · Compliance with Program/Exercises: Pt reports compliance with HEP, ICE and NSAIDS 2x daily. · Recommendations/Intent for next treatment session: \"Next visit will focus on advancements to more challenging activities and reduction in assistance provided\".  Review HEP and progress       stretching and RC strengthening as pt is able.      Future Appointments  Date Time Provider Hadley Myers   12/3/2018 4:15 PM Seth Paul MD [facility]         Total Treatment Duration: 40 minutes  PT Patient Time In/Time Out  Time In: 1030  Time Out: JASMINA Ruffin

## 2018-12-05 NOTE — THERAPY DISCHARGE
Elliott Brock  : 1960  Primary: [address]  Secondary:  [facility] [place] [address], [address], [place]  [phone]   [phone]         OUTPATIENT PHYSICAL THERAPY:Discontinuation Summary 2018    ICD-10: Treatment Diagnosis: (M75.02) L shoulder adhesive capsulitis; (M 25.512) L shoulder pain; (M25.612) L shoulder stiffness; (M62.81) muscle weakness  Precautions/Allergies:   Patient has no known allergies. Fall Risk Score: 2 (? 5 = High Risk)  MD Orders: Evaluate and Treat for L shoulder adhesive capsulitis MEDICAL/REFERRING DIAGNOSIS:  shoulder (M75.02)  DATE OF ONSET: 18  REFERRING PHYSICIAN: Ramin Carvalho MD  RETURN PHYSICIAN APPOINTMENT: [id number]     Elliott Brock has been seen in physical therapy from 18 to 10/10/18 for 4 visits. Treatment has been discontinued at this time due to Expiration of plan of care without further referral by ordering physician and patient failing to return for additional treatment. The below goals were met prior to discontinuation. Some goals were not met due to chronicity of diagnosis. Pt is DC'd from PT to HEP at this time. Thank you for this referral.        PROBLEM LIST (Impacting functional limitations):  1. Decreased Strength  2. Decreased ADL/Functional Activities  3. Increased Pain  4. Decreased Activity Tolerance  5. Decreased Flexibility/Joint Mobility  6. Decreased Knowledge of Precautions  7. Decreased [place] with Home Exercise Program INTERVENTIONS PLANNED:  1. Cold  2. Heat  3. Home Exercise Program (HEP)  4. Manual Therapy  5. Neuromuscular Re-education/Strengthening  6. Range of Motion (ROM)  7. Therapeutic Activites  8. Therapeutic Exercise/Strengthening  9. Ultrasound (US)  10. Kinesiotaping   TREATMENT PLAN:  Effective Dates: 2018 TO 2018 (60 days).  Frequency/Duration: 1 time a week for 60 Days  GOALS: (Goals have been discussed and agreed upon with patient.)  Short-Term Functional Goals: Time Frame: 4 weeks (10-17-18)  1. Pt will be compliant and independent with HEP.--------------------------------------------------------------------------------MET  2. Pt will improve Quick Dash score to <35 % disability to increase pt's overall functional mobility.-----------NOT ASSESSED  3. Pt to subjectively report a decrease in pain to 6/10 @ worst to increase pt's ease with ADLs.---------------NOT ASSESSED  4. Pt will improve standing AROM of L  shoulder to >Flex/Scapt: 130deg; ABD:110deg; ER in N:30deg; ER in ABD:50deg; and IR:L5/sacruum to improve pt's ability to reach behind back, overhead and perform ADLs such as dressing without modifications.---------------------------------NOT ASSESSED  5. Pt will be able to sleep with decreased frequency (3-4x/night) of waking due to  L shoulder pain.--------------------------------NOT ASSESSED  6. Pt will increase MMT to 3+/5 throughout L shoulder and 4+/5 for elbow in order to perform house hold and work related tasks with greater ease.------------NOT ASSESSED    Discharge Goals: Time Frame: 8 weeks (11-14-18)  1. Pt will improve Quick Dash score to < 30 % disability to increase pt's overall functional mobility.------------------------NOT ASSESSED  2. Pt will subjectively report greater ease with tucking in his shirt and putting on a belt. --------------------------------------------NOT ASSESSED  3. Pt will subjectively report a decrease in pain to 4-5/10 @ worst to allow pt greater ease with reaching overhead. ---------------------NOT ASSESSED  4. Pt will subjectively report waking only 2x/night due to L shoulder pain.-----------------------------------------NOT ASSESSED  5. Pt will improve standing AROM of L shoulder to > Flex/Scapt: 135deg; ABD: 120deg; ER in N: 35deg; ER in ABD: 60deg and IR: L3 to increase pt's overall functional mobility. ----------NOT ASSESSED  6.  Pt will increase MMT to 4/5 throughout L shoulder to allow pt to return to PLOF with manageable L shoulder pain. ------------------NOT ASSESSED  7. Pt will be discharged from PT to [facility].------------------------------------------------------------------------MET  Rehabilitation Potential For Stated Goals: Good  Regarding Annie Alexandra's therapy, I certify that the treatment plan above will be carried out by a therapist or under their direction.   Thank you for this referral,  Celeste Narayanan, PT

## 2022-03-19 PROBLEM — E66.01 OBESITY, MORBID (HCC): Status: ACTIVE | Noted: 2018-04-05

## 2023-02-10 NOTE — PROGRESS NOTES
[facility] CARDIOLOGY History & Physical                 Reason for Visit: Bradycardia noted during sleeping hours during a sleep study    Subjective:     Patient is a 58 y.o. male with a PMH of hypertension and hyperlipidemia who presents as a referral for bradycardia noted during sleeping hours during a sleep study. The patient had a TTE in 2020 that was noted to demonstrate a normal EF. The patient denies syncope and near syncope. He denies angina and dyspnea. According to chart review in [facility], he has had a RBBB since at least 2020. Past Medical History:   Diagnosis Date    Anxiety and depression     Awareness under anesthesia     with umb hernia surg    Back pain     Bulging lumbar disc     L4-L5    Essential hypertension, benign     controlled with med    H/O colonoscopy     due in     High cholesterol     on no treatment    Morbid obesity ([facility] [address] 16    BMI- 40 (verbal)    Murmur, cardiac     noted as a child    Other and unspecified hyperlipidemia     Psychiatric disorder     depression/ anxiety      Past Surgical History:   Procedure Laterality Date    HERNIA REPAIR Bilateral     ing    HERNIA REPAIR      umbilical    NEUROLOGICAL SURGERY      lami/ L-4, L5 laminectomy- X 2 surgeries    ORTHOPEDIC SURGERY Right 2002    ORIF fx    ORTHOPEDIC SURGERY      lumbar back surgery via jodee and lit    OTHER SURGICAL HISTORY      reattach quad muscles to left knee      Family History   Problem Relation Age of Onset    Psychiatric Disorder Mother         depression    Pulmonary Embolism Brother     Diabetes Father         type 2;insulin dependent    Arrhythmia Father         Atrial fibrillation      Social History     Tobacco Use    Smoking status: Former     Packs/day: 1.00     Types: Cigarettes     Quit date: 1991     Years since quittin.1    Smokeless tobacco: Never   Substance Use Topics    Alcohol use:  Yes     Alcohol/week: 5.0 - 7.0 standard drinks Not on File      ROS:  No obvious pertinent positives on review of systems except for what was outlined above.        Objective:       /76   Pulse 88   Ht 6' 2\" (1.88 m)   Wt 259 lb (117.5 kg) Comment: with shoes  BMI 33.25 kg/m²     BP Readings from Last 3 Encounters:   02/13/23 138/76       Wt Readings from Last 3 Encounters:   02/13/23 259 lb (117.5 kg)       General/Constitutional:   Alert and oriented x 3, no acute distress  HEENT:   normocephalic, atraumatic, moist mucous membranes  Neck:   No JVD or carotid bruits bilaterally  Cardiovascular:   regular rate and rhythm, no rub/gallop appreciated; 2 out of 6 systolic murmur heard over the right upper sternal border  Pulmonary:   clear to auscultation bilaterally, no respiratory distress  Abdomen:   soft, non-tender, non-distended  Ext:   No sig LE edema bilaterally  Skin:  warm and dry, no obvious rashes seen  Neuro:   no obvious sensory or motor deficits  Psychiatric:   normal mood and affect      ECG:   Sinus rhythm  Right bundle branch block  Heart rate 88 bpm    Data Review:   Lab Results   Component Value Date    CHOL 226 (H) 06/22/2020     Lab Results   Component Value Date    TRIG 162 (H) 06/22/2020     Lab Results   Component Value Date    HDL 45 06/22/2020     Lab Results   Component Value Date    LDLCALC 149 (H) 06/22/2020     Lab Results   Component Value Date    LABVLDL 32 06/22/2020     No results found for: [facility]     Lab Results   Component Value Date/Time     06/22/2020 09:00 AM     06/06/2019 03:23 PM    K 4.5 06/22/2020 09:00 AM    K 4.3 06/06/2019 03:23 PM     06/22/2020 09:00 AM     06/06/2019 03:23 PM    CO2 21 06/22/2020 09:00 AM    CO2 25 06/06/2019 03:23 PM    BUN 20 06/22/2020 09:00 AM    BUN 20 06/06/2019 03:23 PM    CREATININE 1.07 06/22/2020 09:00 AM    CREATININE 1.21 06/06/2019 03:23 PM    GLUCOSE 103 06/22/2020 09:00 AM    GLUCOSE 103 06/06/2019 03:23 PM    CALCIUM 9.2 06/22/2020 09:00 AM CALCIUM 9.8 06/06/2019 03:23 PM         Lab Results   Component Value Date    ALT 45 (H) 06/22/2020    AST 25 06/22/2020        Assessment/Plan:   1. Hypertension, unspecified type  - Well-controlled  - Continue with amlodipine  - Currently on irbesartan    2. Cardiac murmur  - Obtain an echocardiogram     3. History of bradycardia  - Occurred during sleeping hours during a sleep study (referral documentation reviewed)  - Pertinent negatives include near syncope or syncope  - Discussed with patient that bradycardia during sleeping hours occurs due to increased vagal tone   - No further cardiac testing recommended at this time    4.  Hyperlipidemia, unspecified hyperlipidemia type  - Continue with Lipitor    F/U: As needed    Bobbi Rosales MD

## 2023-02-13 ENCOUNTER — INITIAL CONSULT (OUTPATIENT)
Dept: CARDIOLOGY CLINIC | Age: 63
End: 2023-02-13
Payer: COMMERCIAL

## 2023-02-13 VITALS
BODY MASS INDEX: 33.24 KG/M2 | SYSTOLIC BLOOD PRESSURE: 138 MMHG | WEIGHT: 259 LBS | HEIGHT: 74 IN | HEART RATE: 88 BPM | DIASTOLIC BLOOD PRESSURE: 76 MMHG

## 2023-02-13 DIAGNOSIS — Z87.898 HISTORY OF BRADYCARDIA: ICD-10-CM

## 2023-02-13 DIAGNOSIS — R01.1 CARDIAC MURMUR: ICD-10-CM

## 2023-02-13 DIAGNOSIS — I10 HYPERTENSION, UNSPECIFIED TYPE: Primary | ICD-10-CM

## 2023-02-13 DIAGNOSIS — E78.5 HYPERLIPIDEMIA, UNSPECIFIED HYPERLIPIDEMIA TYPE: ICD-10-CM

## 2023-02-13 PROCEDURE — 3075F SYST BP GE 130 - 139MM HG: CPT | Performed by: INTERNAL MEDICINE

## 2023-02-13 PROCEDURE — 93000 ELECTROCARDIOGRAM COMPLETE: CPT | Performed by: INTERNAL MEDICINE

## 2023-02-13 PROCEDURE — 99204 OFFICE O/P NEW MOD 45 MIN: CPT | Performed by: INTERNAL MEDICINE

## 2023-02-13 PROCEDURE — 3078F DIAST BP <80 MM HG: CPT | Performed by: INTERNAL MEDICINE

## 2023-02-13 RX ORDER — BUSPIRONE HYDROCHLORIDE 5 MG/1
5 TABLET ORAL NIGHTLY
COMMUNITY

## 2023-02-13 RX ORDER — METFORMIN HYDROCHLORIDE 500 MG/1
500 TABLET, EXTENDED RELEASE ORAL 2 TIMES DAILY
COMMUNITY

## 2023-02-13 RX ORDER — CELECOXIB 200 MG/1
200 CAPSULE ORAL DAILY
COMMUNITY

## 2023-02-13 RX ORDER — TRAMADOL HYDROCHLORIDE 50 MG/1
50 TABLET ORAL EVERY 6 HOURS PRN
COMMUNITY

## 2023-02-13 RX ORDER — BUPROPION HYDROCHLORIDE 75 MG/1
75 TABLET ORAL DAILY
COMMUNITY

## 2023-02-13 RX ORDER — ZOLPIDEM TARTRATE 12.5 MG/1
12.5 TABLET, FILM COATED, EXTENDED RELEASE ORAL NIGHTLY PRN
COMMUNITY

## 2023-02-16 ENCOUNTER — TELEPHONE (OUTPATIENT)
Dept: CARDIOLOGY CLINIC | Age: 63
End: 2023-02-16

## 2023-02-16 NOTE — TELEPHONE ENCOUNTER
----- Message from Tila Adams MD sent at 2/15/2023  7:20 PM EST -----  Please let the patient know that the heart function is normal on ECHO. The patient's aortic valve measurements are most consistent with aortic valve sclerosis (calcification and thickening of the aortic valve) without stenosis. I recommend that the ultrasound be repeated in 3 to 5 years. This can be done by his PCP and the patient can follow-up with us as needed. A message was sent to the patient's PCP with this recommendation.

## 2023-08-31 ENCOUNTER — OFFICE VISIT (OUTPATIENT)
Dept: ORTHOPEDIC SURGERY | Age: 63
End: 2023-08-31
Payer: COMMERCIAL

## 2023-08-31 VITALS — WEIGHT: 304 LBS | BODY MASS INDEX: 39.01 KG/M2 | HEIGHT: 74 IN

## 2023-08-31 DIAGNOSIS — M17.12 PRIMARY OSTEOARTHRITIS OF LEFT KNEE: ICD-10-CM

## 2023-08-31 DIAGNOSIS — M25.562 LEFT KNEE PAIN, UNSPECIFIED CHRONICITY: Primary | ICD-10-CM

## 2023-08-31 DIAGNOSIS — M25.561 RIGHT KNEE PAIN, UNSPECIFIED CHRONICITY: ICD-10-CM

## 2023-08-31 PROCEDURE — 99204 OFFICE O/P NEW MOD 45 MIN: CPT | Performed by: ORTHOPAEDIC SURGERY

## 2023-08-31 RX ORDER — DARIDOREXANT 50 MG/1
TABLET, FILM COATED ORAL
COMMUNITY
Start: 2023-08-22

## 2023-08-31 RX ORDER — CARIPRAZINE 1.5 MG/1
CAPSULE, GELATIN COATED ORAL
COMMUNITY
Start: 2023-05-30

## 2023-08-31 RX ORDER — IRBESARTAN 300 MG/1
TABLET ORAL
COMMUNITY
Start: 2023-08-15

## 2023-08-31 RX ORDER — ESZOPICLONE 1 MG/1
TABLET, FILM COATED ORAL
COMMUNITY
Start: 2023-07-07

## 2023-08-31 RX ORDER — BUSPIRONE HYDROCHLORIDE 10 MG/1
TABLET ORAL
COMMUNITY
Start: 2023-05-30

## 2023-08-31 RX ORDER — BUPROPION HYDROCHLORIDE 100 MG/1
TABLET, EXTENDED RELEASE ORAL
COMMUNITY
Start: 2023-06-16

## 2023-08-31 RX ORDER — GABAPENTIN 300 MG/1
CAPSULE ORAL
COMMUNITY
Start: 2023-07-16

## 2024-01-15 ENCOUNTER — TELEPHONE (OUTPATIENT)
Dept: ORTHOPEDIC SURGERY | Age: 64
End: 2024-01-15

## 2024-01-15 NOTE — TELEPHONE ENCOUNTER
Confirmed with the pt protocol typically is 12 weeks from surgery, his surgery date is about 8 weeks out if that. Instructed to ice elevate & tylenol - he will continue this.

## 2024-01-22 ENCOUNTER — TELEPHONE (OUTPATIENT)
Dept: ORTHOPEDIC SURGERY | Age: 64
End: 2024-01-22

## 2024-01-22 DIAGNOSIS — M17.12 PRIMARY OSTEOARTHRITIS OF LEFT KNEE: Primary | ICD-10-CM

## 2024-01-29 ENCOUNTER — PREP FOR PROCEDURE (OUTPATIENT)
Dept: ORTHOPEDIC SURGERY | Age: 64
End: 2024-01-29

## 2024-01-29 DIAGNOSIS — M17.12 PRIMARY OSTEOARTHRITIS OF LEFT KNEE: Primary | ICD-10-CM

## 2024-01-29 RX ORDER — SODIUM CHLORIDE 0.9 % (FLUSH) 0.9 %
5-40 SYRINGE (ML) INJECTION EVERY 12 HOURS SCHEDULED
Status: CANCELLED | OUTPATIENT
Start: 2024-01-29

## 2024-01-29 RX ORDER — SODIUM CHLORIDE 9 MG/ML
INJECTION, SOLUTION INTRAVENOUS PRN
Status: CANCELLED | OUTPATIENT
Start: 2024-01-29

## 2024-01-29 RX ORDER — ACETAMINOPHEN 325 MG/1
1000 TABLET ORAL ONCE
Status: CANCELLED | OUTPATIENT
Start: 2024-01-29 | End: 2024-01-29

## 2024-01-29 RX ORDER — SODIUM CHLORIDE 0.9 % (FLUSH) 0.9 %
5-40 SYRINGE (ML) INJECTION PRN
Status: CANCELLED | OUTPATIENT
Start: 2024-01-29

## 2024-01-30 ENCOUNTER — HOSPITAL ENCOUNTER (OUTPATIENT)
Dept: REHABILITATION | Age: 64
Discharge: HOME | End: 2024-02-02
Payer: COMMERCIAL

## 2024-01-30 ENCOUNTER — HOSPITAL ENCOUNTER (OUTPATIENT)
Dept: SURGERY | Age: 64
Discharge: HOME | End: 2024-02-02
Payer: COMMERCIAL

## 2024-01-30 VITALS
OXYGEN SATURATION: 96 % | DIASTOLIC BLOOD PRESSURE: 69 MMHG | RESPIRATION RATE: 16 BRPM | WEIGHT: 313 LBS | TEMPERATURE: 98.2 F | HEART RATE: 72 BPM | SYSTOLIC BLOOD PRESSURE: 120 MMHG | BODY MASS INDEX: 40.17 KG/M2 | HEIGHT: 74 IN

## 2024-01-30 DIAGNOSIS — M17.12 PRIMARY OSTEOARTHRITIS OF LEFT KNEE: ICD-10-CM

## 2024-01-30 LAB
ANION GAP SERPL CALC-SCNC: 1 MMOL/L (ref 2–11)
APTT PPP: 28.4 SEC (ref 23.3–37.4)
BASOPHILS # BLD: 0 K/UL (ref 0–0.2)
BASOPHILS NFR BLD: 0 % (ref 0–2)
BUN SERPL-MCNC: 37 MG/DL (ref 8–23)
CALCIUM SERPL-MCNC: 9.3 MG/DL (ref 8.3–10.4)
CHLORIDE SERPL-SCNC: 107 MMOL/L (ref 103–113)
CO2 SERPL-SCNC: 27 MMOL/L (ref 21–32)
CREAT SERPL-MCNC: 1.35 MG/DL (ref 0.8–1.5)
DIFFERENTIAL METHOD BLD: ABNORMAL
EOSINOPHIL # BLD: 0.3 K/UL (ref 0–0.8)
EOSINOPHIL NFR BLD: 6 % (ref 0.5–7.8)
ERYTHROCYTE [DISTWIDTH] IN BLOOD BY AUTOMATED COUNT: 12.8 % (ref 11.9–14.6)
EST. AVERAGE GLUCOSE BLD GHB EST-MCNC: 97 MG/DL
GLUCOSE SERPL-MCNC: 103 MG/DL (ref 65–100)
HBA1C MFR BLD: 5 % (ref 4.8–5.6)
HCT VFR BLD AUTO: 37.1 % (ref 41.1–50.3)
HGB BLD-MCNC: 12.6 G/DL (ref 13.6–17.2)
IMM GRANULOCYTES # BLD AUTO: 0 K/UL (ref 0–0.5)
IMM GRANULOCYTES NFR BLD AUTO: 0 % (ref 0–5)
INR PPP: 1.1
LYMPHOCYTES # BLD: 1.4 K/UL (ref 0.5–4.6)
LYMPHOCYTES NFR BLD: 29 % (ref 13–44)
MCH RBC QN AUTO: 31.3 PG (ref 26.1–32.9)
MCHC RBC AUTO-ENTMCNC: 34 G/DL (ref 31.4–35)
MCV RBC AUTO: 92.3 FL (ref 82–102)
MONOCYTES # BLD: 0.5 K/UL (ref 0.1–1.3)
MONOCYTES NFR BLD: 11 % (ref 4–12)
NEUTS SEG # BLD: 2.7 K/UL (ref 1.7–8.2)
NEUTS SEG NFR BLD: 54 % (ref 43–78)
NRBC # BLD: 0 K/UL (ref 0–0.2)
PLATELET # BLD AUTO: 187 K/UL (ref 150–450)
PMV BLD AUTO: 11.2 FL (ref 9.4–12.3)
POTASSIUM SERPL-SCNC: 3.8 MMOL/L (ref 3.5–5.1)
PROTHROMBIN TIME: 13.8 SEC (ref 11.3–14.9)
RBC # BLD AUTO: 4.02 M/UL (ref 4.23–5.6)
SODIUM SERPL-SCNC: 135 MMOL/L (ref 136–146)
WBC # BLD AUTO: 4.9 K/UL (ref 4.3–11.1)

## 2024-01-30 PROCEDURE — 94664 DEMO&/EVAL PT USE INHALER: CPT

## 2024-01-30 PROCEDURE — 87641 MR-STAPH DNA AMP PROBE: CPT

## 2024-01-30 PROCEDURE — 85610 PROTHROMBIN TIME: CPT

## 2024-01-30 PROCEDURE — 80048 BASIC METABOLIC PNL TOTAL CA: CPT

## 2024-01-30 PROCEDURE — 94760 N-INVAS EAR/PLS OXIMETRY 1: CPT

## 2024-01-30 PROCEDURE — 85730 THROMBOPLASTIN TIME PARTIAL: CPT

## 2024-01-30 PROCEDURE — 85025 COMPLETE CBC W/AUTO DIFF WBC: CPT

## 2024-01-30 PROCEDURE — 97161 PT EVAL LOW COMPLEX 20 MIN: CPT

## 2024-01-30 PROCEDURE — 83036 HEMOGLOBIN GLYCOSYLATED A1C: CPT

## 2024-01-30 ASSESSMENT — KOOS JR
TWISING OR PIVOTING ON KNEE: 2
GOING UP OR DOWN STAIRS: 1
BENDING TO THE FLOOR TO PICK UP OBJECT: 2
KOOS JR TOTAL INTERVAL SCORE: 59.381
RISING FROM SITTING: 2
STANDING UPRIGHT: 1
HOW SEVERE IS YOUR KNEE STIFFNESS AFTER FIRST WAKING IN MORNING: 1
STRAIGHTENING KNEE FULLY: 2

## 2024-01-30 ASSESSMENT — PAIN DESCRIPTION - ORIENTATION: ORIENTATION: LEFT;ANTERIOR;INNER;OUTER

## 2024-01-30 ASSESSMENT — PULMONARY FUNCTION TESTS
FEV1 (LITERS): 2.67
FEV1 (%PREDICTED): 86

## 2024-01-30 ASSESSMENT — PROMIS GLOBAL HEALTH SCALE
TO WHAT EXTENT ARE YOU ABLE TO CARRY OUT YOUR EVERYDAY PHYSICAL ACTIVITIES SUCH AS WALKING, CLIMBING STAIRS, CARRYING GROCERIES, OR MOVING A CHAIR [ON A SCALE OF 1 (NOT AT ALL) TO 5 (COMPLETELY)]?: 3
IN THE PAST 7 DAYS, HOW WOULD YOU RATE YOUR FATIGUE ON AVERAGE [ON A SCALE FROM 1 (NONE) TO 5 (VERY SEVERE)]?: 3
IN GENERAL, WOULD YOU SAY YOUR HEALTH IS...[ON A SCALE OF 1 (POOR) TO 5 (EXCELLENT)]: 4
IN THE PAST 7 DAYS, HOW OFTEN HAVE YOU BEEN BOTHERED BY EMOTIONAL PROBLEMS, SUCH AS FEELING ANXIOUS, DEPRESSED, OR IRRITABLE [ON A SCALE FROM 1 (NEVER) TO 5 (ALWAYS)]?: 4
SUM OF RESPONSES TO QUESTIONS 2, 4, 5, & 10: 13
IN GENERAL, HOW WOULD YOU RATE YOUR SATISFACTION WITH YOUR SOCIAL ACTIVITIES AND RELATIONSHIPS [ON A SCALE OF 1 (POOR) TO 5 (EXCELLENT)]?: 3
WHO IS THE PERSON COMPLETING THE PROMIS V1.1 SURVEY?: 0
IN GENERAL, HOW WOULD YOU RATE YOUR MENTAL HEALTH, INCLUDING YOUR MOOD AND YOUR ABILITY TO THINK [ON A SCALE OF 1 (POOR) TO 5 (EXCELLENT)]?: 3
SUM OF RESPONSES TO QUESTIONS 3, 6, 7, & 8: 13
IN GENERAL, HOW WOULD YOU RATE YOUR PHYSICAL HEALTH [ON A SCALE OF 1 (POOR) TO 5 (EXCELLENT)]?: 3
HOW IS THE PROMIS V1.1 BEING ADMINISTERED?: 0
IN GENERAL, PLEASE RATE HOW WELL YOU CARRY OUT YOUR USUAL SOCIAL ACTIVITIES (INCLUDES ACTIVITIES AT HOME, AT WORK, AND IN YOUR COMMUNITY, AND RESPONSIBILITIES AS A PARENT, CHILD, SPOUSE, EMPLOYEE, FRIEND, ETC) [ON A SCALE OF 1 (POOR) TO 5 (EXCELLENT)]?: 4
IN GENERAL, WOULD YOU SAY YOUR QUALITY OF LIFE IS...[ON A SCALE OF 1 (POOR) TO 5 (EXCELLENT)]: 3
IN THE PAST 7 DAYS, HOW WOULD YOU RATE YOUR PAIN ON AVERAGE [ON A SCALE FROM 0 (NO PAIN) TO 10 (WORST IMAGINABLE PAIN)]?: 4

## 2024-01-30 ASSESSMENT — PAIN SCALES - GENERAL
PAINLEVEL_OUTOF10: 0
PAINLEVEL_OUTOF10: 3

## 2024-01-30 ASSESSMENT — PAIN DESCRIPTION - LOCATION: LOCATION: KNEE

## 2024-01-30 ASSESSMENT — PAIN DESCRIPTION - DESCRIPTORS: DESCRIPTORS: ACHING;SHARP

## 2024-01-30 NOTE — PERIOP NOTE
The below lab results are within anesthesia guidelines, no follow-up required. Labs automatically routed to ordering provider via Epic documentation.     Latest Reference Range & Units 01/30/24 07:59   Sodium 136 - 146 mmol/L 135 (L)   Potassium 3.5 - 5.1 mmol/L 3.8   Chloride 103 - 113 mmol/L 107   CO2 21 - 32 mmol/L 27   BUN,BUNPL 8 - 23 MG/DL 37 (H)   Creatinine 0.8 - 1.5 MG/DL 1.35   Anion Gap 2 - 11 mmol/L 1 (L)   Est, Glom Filt Rate >60 ml/min/1.73m2 59 (L)   Glucose, Random 65 - 100 mg/dL 103 (H)   CALCIUM, SERUM, 894381 8.3 - 10.4 MG/DL 9.3   Hemoglobin A1C 4.8 - 5.6 % 5.0   eAG (mg/dL) mg/dL 97   WBC 4.3 - 11.1 K/uL 4.9   RBC 4.23 - 5.6 M/uL 4.02 (L)   Hemoglobin Quant 13.6 - 17.2 g/dL 12.6 (L)   Hematocrit 41.1 - 50.3 % 37.1 (L)   MCV 82.0 - 102.0 FL 92.3   MCH 26.1 - 32.9 PG 31.3   MCHC 31.4 - 35.0 g/dL 34.0   MPV 9.4 - 12.3 FL 11.2   RDW 11.9 - 14.6 % 12.8   Platelet Count 150 - 450 K/uL 187   Neutrophils % 43 - 78 % 54   Lymphocyte % 13 - 44 % 29   Monocytes % 4.0 - 12.0 % 11   Eosinophils % 0.5 - 7.8 % 6   Basophils % 0.0 - 2.0 % 0   Neutrophils Absolute 1.7 - 8.2 K/UL 2.7   Lymphocytes Absolute 0.5 - 4.6 K/UL 1.4   Monocytes Absolute 0.1 - 1.3 K/UL 0.5   Eosinophils Absolute 0.0 - 0.8 K/UL 0.3   Basophils Absolute 0.0 - 0.2 K/UL 0.0   Differential Type -   AUTOMATED   Immature Granulocytes 0.0 - 5.0 % 0   Nucleated Red Blood Cells 0.0 - 0.2 K/uL 0.00   Absolute Immature Granulocyte 0.0 - 0.5 K/UL 0.0   Prothrombin Time 11.3 - 14.9 sec 13.8   INR -   1.1   PTT 23.3 - 37.4 SEC 28.4   [facility]SA/[facility] DNA  Rpt   (L): Data is abnormally low  (H): Data is abnormally high  Rpt: View report in Results Review for more information

## 2024-01-30 NOTE — PERIOP NOTE
PLEASE CONTINUE TAKING ALL PRESCRIPTION MEDICATIONS UP TO THE DAY OF SURGERY UNLESS OTHERWISE DIRECTED BELOW.     DISCONTINUE all over the counter vitamins, supplements, and herbals starting today. DISCONTINUE Non-Steroidal Anti-Inflammatory (NSAIDS) such as Advil, Ibuprofen, Motrin, Naproxen, and Aleve 5 days prior to surgery.      *IT IS OK TO TAKE TYLENOL, Allergy medication, and indigestion medications*  *OK to continue Celebrex*     Prescription medications to HOLD     Hold Diclofenac 5 days prior to surgery          CONTINUE all other prescriptions like normal up until the day of surgery--TAKE ONLY THE BELOW MEDICATIONS THE DAY OF SURGERY.    Tramadol if needed, Alprazolam (Xanax) if needed, Gabapentin,    Bupropion (Wellbutrin), Escitalopram (Lexapro)    Atorvastatin, Vraylar, Amlodipine,       Comments       Bring Dynahex soap and incentive spirometer back to the hospital.          Please do not bring home medications with you on the day of surgery unless otherwise directed by your nurse.  If you are instructed to bring home medications, please give them to your nurse as they will be administered by the nursing staff.     If you have any questions, please call [facility] [phone].     A copy of this note was provided to the patient for reference.

## 2024-01-30 NOTE — PERIOP NOTE
Patient verified name and .    Order for consent found in EHR and matches case posting; patient verified.     Type 3 surgery, joint assessment complete.    Labs per surgeon: CBC,BMP, PT/PTT, HgbA1c; results pending.  Labs per anesthesia protocol: no additional  EKG: Completed 23; results within anesthesia guidelines.     Pt has a murmur--no SOB/CP. Echo (23) located in EHR if needed.     MRSA/MSSA swab collected; pharmacy to review and dose antibiotic as appropriate.     Hospital approved surgical skin cleanser and instructions to return bottle on DOS given per hospital policy.    Patient provided with handouts including Guide to Surgery, Pain Management, Hand Hygiene, Blood Transfusion Education, and [facility] Brochure.    Patient answered medical/surgical history questions at their best of ability. All prior to admission medications documented in [facility]. Original medication prescription bottle NOT visualized during patient appointment.    Pt was unsure about home medications--pt instructed to call PAT department if he needs to update/correct medication list when he gets home. Pt verbalized understanding.     Patient instructed to hold all vitamins, supplements, herbals 3 weeks prior to surgery and NSAIDS 5 days prior to surgery.     Patient teach back successful and patient demonstrates knowledge of instruction.

## 2024-01-30 NOTE — PROGRESS NOTES
sitting Bending to floor/ an object Jr. EDYTA Knee Survey Score   1/30/2024   8:30 AM 1 2 2 1 1 2 2 11        LOWER EXTREMITY GROSS EVALUATION:  RIGHT LE   Within Functional Limits   Abnormal   Comments   Strength [x] []  4/5   Range of Motion [x] []        LEFT LE Within Functional Limits   Abnormal   Comments   Strength [] [] Strength LLE  Strength LLE: Exception  L Hip Flexion: 3+/5  L Knee Extension: 3+/5  L Ankle Dorsiflexion: 4/5  L Ankle Plantar Flexion: 4/5   Range of Motion [] [] AROM LLE (degrees)  LLE AROM : Exceptions  L Knee Flexion (0-145): 110  L Knee Extension (0): 18     UPPER EXTREMITY GROSS EVALUATION:     Within Functional Limits   Abnormal   Comments   Strength [x] []    Range of Motion [x] []      SENSATION  Sensation [x]       COGNITION/  PERCEPTION: Intact Impaired (Comments):   Orientation [x]     Vision [x]     Hearing [x]     Cognition  [x]       TRANSFERS: I Mod I S SBA CGA Min Mod Max Total  NT x2 Comments:   Sit to Stand [x] [] [] [] [] [] [] [] [] [] []    Stand to Sit [x] [] [] [] [] [] [] [] [] [] []    Stand pivot [x] [] [] [] [] [] [] [] [] [] []     [] [] [] [] [] [] [] [] [] [] []    I=Independent, Mod I=Modified Independent, S=Supervision, SBA=Standby Assistance, CGA=Contact Guard Assistance,   Min=Minimal Assistance, Mod=Moderate Assistance, Max=Maximal Assistance, Total=Total Assistance, NT=Not Tested    BALANCE: Good Fair+ Fair Fair- Poor NT Comments   Sitting Static [x] [] [] [] [] []    Sitting Dynamic [x] [] [] [] [] []              Standing Static [] [x] [] [] [] []    Standing Dynamic [] [x] [] [] [] []      Postural Assessment:   Genu Varus Left  Leg length discrepancy   L LE 1/8\" shorter than R    GAIT: I Mod I S SBA CGA Min Mod Max Total  NT x2 Comments:   Level of Assistance [x] [] [] [] [] [] [] [] [] [] []    Weightbearing Status       Distance  300 feet    Gait Quality Antalgic, Decreased abigail , Decreased step length, and Decreased stance    DME None

## 2024-01-31 DIAGNOSIS — M17.12 PRIMARY OSTEOARTHRITIS OF LEFT KNEE: ICD-10-CM

## 2024-01-31 LAB
MRSA DNA SPEC QL NAA+PROBE: NOT DETECTED
S AUREUS CPE NOSE QL NAA+PROBE: DETECTED

## 2024-01-31 NOTE — PROGRESS NOTES
Total Joint Surgery Preoperative Chart Review      Patient ID:  Gilbert Yo  [id number]  63 y.o.  1960  Surgeon: Dr. Dominguez  Date of Surgery: 2024  Procedure: Total Left Knee Arthroplasty  Primary Care Physician: Esha Lackey -[phone]  Specialty Physician(s):      Subjective:   Gilbert Yo is a 63 y.o. White (non-) male who presents for preoperative evaluation for Total Left Knee arthroplasty.    This is a preoperative chart review note based on data collected by the nurse at the surgical Pre-Assessment visit.    Past Medical History:   Diagnosis Date    Anxiety and depression     managed with medication    Awareness under anesthesia     with umb hernia surg    Back pain     Bulging lumbar disc     L4-L5    Essential hypertension, benign     controlled with med    H/O colonoscopy     due in     High cholesterol     managed with medication    History of anemia     Morbid obesity (HCC) 16    BMI- 40 (verbal)    Murmur, cardiac     Echo (23) in EHR if needed    Other and unspecified hyperlipidemia     Prediabetes     Metformin for prevention    Psychiatric disorder     depression/ anxiety      Past Surgical History:   Procedure Laterality Date    HERNIA REPAIR Bilateral     ing    HERNIA REPAIR      umbilical    NEUROLOGICAL SURGERY      lami/ L-4, L5 laminectomy- X 2 surgeries    ORTHOPEDIC SURGERY Right 2002    ORIF fx    ORTHOPEDIC SURGERY      lumbar back surgery via jodee and lit    OTHER SURGICAL HISTORY      reattach quad muscles to left knee     Family History   Problem Relation Age of Onset    Psychiatric Disorder Mother         depression    Pulmonary Embolism Brother     Diabetes Father         type 2;insulin dependent    Arrhythmia Father         Atrial fibrillation      Social History     Tobacco Use    Smoking status: Former     Current packs/day: 0.00     Types: Cigarettes     Quit date: 1991     Years since quittin.1    Smokeless 
               DENIES  Morning or daytime tiredness/ sleepiness:                              TIRED  Dry mouth or sore throat in morning:            YES                                              Coates stage:  4                                   SACS score:35  Stop Bang                                     CS HS  RESPIRATORY ASSESSMENT Q SHIFT   O2 PRN    ALBUTEROL  NEBULIZER Q6 PRN WHEEZING                                         Referrals:  [facility]  Pt. Phone Number:

## 2024-02-01 ENCOUNTER — OFFICE VISIT (OUTPATIENT)
Dept: ORTHOPEDIC SURGERY | Age: 64
End: 2024-02-01

## 2024-02-01 DIAGNOSIS — Z01.818 ENCOUNTER FOR PRE-OPERATIVE EXAMINATION: ICD-10-CM

## 2024-02-01 DIAGNOSIS — M17.12 PRIMARY OSTEOARTHRITIS OF LEFT KNEE: Primary | ICD-10-CM

## 2024-02-01 RX ORDER — ASPIRIN 81 MG/1
81 TABLET ORAL 2 TIMES DAILY
Qty: 70 TABLET | Refills: 0 | Status: SHIPPED | OUTPATIENT
Start: 2024-02-07 | End: 2024-03-13

## 2024-02-01 RX ORDER — METHOCARBAMOL 750 MG/1
750 TABLET, FILM COATED ORAL 3 TIMES DAILY PRN
Qty: 40 TABLET | Refills: 1 | Status: SHIPPED | OUTPATIENT
Start: 2024-02-07

## 2024-02-01 RX ORDER — OXYCODONE HYDROCHLORIDE 5 MG/1
5-10 TABLET ORAL
Qty: 60 TABLET | Refills: 0 | Status: SHIPPED | OUTPATIENT
Start: 2024-02-07 | End: 2024-02-12

## 2024-02-01 RX ORDER — PROMETHAZINE HYDROCHLORIDE 12.5 MG/1
12.5 TABLET ORAL 4 TIMES DAILY PRN
Qty: 20 TABLET | Refills: 2 | Status: SHIPPED | OUTPATIENT
Start: 2024-02-07

## 2024-02-01 RX ORDER — CELECOXIB 200 MG/1
200 CAPSULE ORAL 2 TIMES DAILY
Qty: 60 CAPSULE | Refills: 0 | Status: SHIPPED | OUTPATIENT
Start: 2024-02-07 | End: 2024-03-08

## 2024-02-01 NOTE — PROGRESS NOTES
[facility]  Pre Operative History and Physical Exam    Patient ID:  Gilbert Yo  [id number]  63 y.o.  1960    Today: February 1, 2024           CC: Left knee pain    HPI:   The patient has end stage arthritis of the left knee. The patient was evaluated and examined during a consultation prior to this office visit.  There have been no changes to the patient's orthopedic condition since the initial consultation. The patient has failed previous conservative treatment for this condition including antiinflammatories , and lifestyle modifications. The necessity for joint replacement is present. The patient will be admitted the day of surgery for left knee replacement    Past Medical/Surgical History:  Past Medical History:   Diagnosis Date    Anxiety and depression     managed with medication    Awareness under anesthesia 1980s    with umb hernia surg    Back pain     Bulging lumbar disc     L4-L5    Essential hypertension, benign     controlled with med    H/O colonoscopy 2018    due in 2027    High cholesterol     managed with medication    History of anemia     Morbid obesity (HCC) 5/31/16    BMI- 40 (verbal)    Murmur, cardiac     Echo (2/14/23) in EHR if needed    Other and unspecified hyperlipidemia     Prediabetes     Metformin for prevention    Psychiatric disorder     depression/ anxiety     Past Surgical History:   Procedure Laterality Date    HERNIA REPAIR Bilateral     ing    HERNIA REPAIR      umbilical    NEUROLOGICAL SURGERY      lami/ L-4, L5 laminectomy- X 2 surgeries    ORTHOPEDIC SURGERY Right 2002    ORIF fx    ORTHOPEDIC SURGERY      lumbar back surgery via jodee and lit    OTHER SURGICAL HISTORY      reattach quad muscles to left knee        Allergies: No Known Allergies     Physical Exam:   General: NAD, Alert, Oriented, Appears their stated age     HEENT: NC/AT    Skin: No rashes, lesions or wounds seen      Psych: normal affect      Heart: Regular Rate, Rhythm

## 2024-02-01 NOTE — H&P (VIEW-ONLY)
01/30/2024 97  mg/dL Final    Comment: Reference Range  Normal: 4.8-5.6  Diabetic >=6.5%  Normal       <5.7%      WBC 01/30/2024 4.9  4.3 - 11.1 K/uL Final    RBC 01/30/2024 4.02 (L)  4.23 - 5.6 M/uL Final    Hemoglobin 01/30/2024 12.6 (L)  13.6 - 17.2 g/dL Final    Hematocrit 01/30/2024 37.1 (L)  41.1 - 50.3 % Final    MCV 01/30/2024 92.3  82.0 - 102.0 FL Final    MCH 01/30/2024 31.3  26.1 - 32.9 PG Final    MCHC 01/30/2024 34.0  31.4 - 35.0 g/dL Final    RDW 01/30/2024 12.8  11.9 - 14.6 % Final    Platelets 01/30/2024 187  150 - 450 K/uL Final    MPV 01/30/2024 11.2  9.4 - 12.3 FL Final    nRBC 01/30/2024 0.00  0.0 - 0.2 K/uL Final    **Note: Absolute NRBC parameter is now reported with Hemogram**    Differential Type 01/30/2024 AUTOMATED    Final    Neutrophils % 01/30/2024 54  43 - 78 % Final    Lymphocytes % 01/30/2024 29  13 - 44 % Final    Monocytes % 01/30/2024 11  4.0 - 12.0 % Final    Eosinophils % 01/30/2024 6  0.5 - 7.8 % Final    Basophils % 01/30/2024 0  0.0 - 2.0 % Final    Immature Granulocytes 01/30/2024 0  0.0 - 5.0 % Final    Neutrophils Absolute 01/30/2024 2.7  1.7 - 8.2 K/UL Final    Lymphocytes Absolute 01/30/2024 1.4  0.5 - 4.6 K/UL Final    Monocytes Absolute 01/30/2024 0.5  0.1 - 1.3 K/UL Final    Eosinophils Absolute 01/30/2024 0.3  0.0 - 0.8 K/UL Final    Basophils Absolute 01/30/2024 0.0  0.0 - 0.2 K/UL Final    Absolute Immature Granulocyte 01/30/2024 0.0  0.0 - 0.5 K/UL Final    Sodium 01/30/2024 135 (L)  136 - 146 mmol/L Final    Potassium 01/30/2024 3.8  3.5 - 5.1 mmol/L Final    Chloride 01/30/2024 107  103 - 113 mmol/L Final    CO2 01/30/2024 27  21 - 32 mmol/L Final    Anion Gap 01/30/2024 1 (L)  2 - 11 mmol/L Final    Glucose 01/30/2024 103 (H)  65 - 100 mg/dL Final    Comment: 47 - 60 mg/dl Consistent with, but not fully diagnostic of hypoglycemia.  101 - 125 mg/dl Impaired fasting glucose/consistent with pre-diabetes mellitus  > 126 mg/dl Fasting glucose consistent with overt  diabetes mellitus      BUN 01/30/2024 37 (H)  8 - 23 MG/DL Final    Creatinine 01/30/2024 1.35  0.8 - 1.5 MG/DL Final    Est, Glom Filt Rate 01/30/2024 59 (L)  >60 ml/min/1.73m2 Final    Comment:    Pediatric calculator link: https://www.kidney.org/professionals/kdoqi/gfr_calculatorped     These results are not intended for use in patients <18 years of age.     eGFR results are calculated without a race factor using  the 2021 CKD-EPI equation. Careful clinical correlation is recommended, particularly when comparing to results calculated using previous equations.  The CKD-EPI equation is less accurate in patients with extremes of muscle mass, extra-renal metabolism of creatinine, excessive creatine ingestion, or following therapy that affects renal tubular secretion.      Calcium 01/30/2024 9.3  8.3 - 10.4 MG/DL Final    PTT 01/30/2024 28.4  23.3 - 37.4 SEC Final    Comment: Heparin Therapeutic Range = 74 - 123 seconds  In addition to factor deficiency, monitoring heparin therapy, etc., evaluation of a prolonged aPTT result should include consideration of preanalytic variables such as heparin flush contamination, specimen integrity issues, etc.  PLEASE NOTE NEW REFERENCE RANGE      MRSA by PCR 01/30/2024 Not detected  NOTD   Final    Comment: A positive test result does not necessarily indicate the presence of a viable organism. A positive result is indicative of the presence of SA or MRSA DNA.  The BD Max StaphSR assay is intended to aid in the prevention and control of MRSA and SA infections in healthcare settings.  It is not intended to diagnose MRSA or SA infections nor guide or monitor treatment for MRSA/SA infections. A negative result does not preclude nasal colonization.      SA by PCR 01/30/2024 Detected (A)  NOTD   Final                 Patient Active Problem List   Diagnosis    Essential hypertension, benign    Obesity, morbid (HCC)    Spinal stenosis, lumbar region, with neurogenic claudication    S/P

## 2024-02-08 ENCOUNTER — ANESTHESIA EVENT (OUTPATIENT)
Dept: SURGERY | Age: 64
End: 2024-02-08
Payer: COMMERCIAL

## 2024-02-08 NOTE — DISCHARGE INSTRUCTIONS
[facility]   Patient Discharge Instructions    Gilbert Yo / [id number] : 1960    Admitted (Not on file) Discharged: 2024     IF YOU HAVE ANY PROBLEMS ONCE YOU ARE AT HOME CALL THE FOLLOWING NUMBERS:   Nurse's line: [phone]  Main office number: [phone]      Medications    The medications you are to continue on are listed on the medication reconciliation sheet.   Narcotic pain medications as well as supplemental iron can cause constipation. If this occurs, try over the counter stool softeners or try stopping the narcotic pain medication and/or the iron.   It is important that you take the medication exactly as they are prescribed.  Medications which increase your risk of blood clots are listed to stop for 5 weeks after surgery as well as medications or supplements which increase your risk of bleeding complications.   Keep your medication in the bottles provided by the pharmacist and keep a list of the medication names, dosages, and times to be taken in your wallet.   Do not take other medications without consulting your doctor.  If you need a refill on your pain medication, please note our office is closed over the weekend. It is our office policy that on-call providers cannot refill narcotic pain medications over the weekend. If you will need a refill over the weekend, please call our office before 12pm on Friday or first thing Monday morning.        Important Information    Do NOT smoke as this will greatly increase your risk of infection!    Resume your prehospital diet. If you have excessive nausea or vomitting call your doctor's office     Leg swelling and warmth is normal for 6 months after surgery. If you experience swelling in your leg, elevate your leg while laying down with your toes above your heart. If you have sudden onset severe swelling with leg pain call our office. Use Michael Hose stockings until we see you in the office for your follow up

## 2024-02-09 ENCOUNTER — HOME HEALTH ADMISSION (OUTPATIENT)
Dept: HOME HEALTH SERVICES | Facility: HOME HEALTH | Age: 64
End: 2024-02-09
Payer: MEDICARE

## 2024-02-09 ENCOUNTER — HOSPITAL ENCOUNTER (OUTPATIENT)
Age: 64
Discharge: HOME | End: 2024-02-10
Attending: ORTHOPAEDIC SURGERY | Admitting: ORTHOPAEDIC SURGERY
Payer: COMMERCIAL

## 2024-02-09 ENCOUNTER — ANESTHESIA (OUTPATIENT)
Dept: SURGERY | Age: 64
End: 2024-02-09
Payer: COMMERCIAL

## 2024-02-09 PROBLEM — Z96.652 STATUS POST LEFT KNEE REPLACEMENT: Status: ACTIVE | Noted: 2024-02-09

## 2024-02-09 LAB
GLUCOSE BLD STRIP.AUTO-MCNC: 116 MG/DL (ref 65–100)
SERVICE CMNT-IMP: ABNORMAL

## 2024-02-09 PROCEDURE — 6360000002 HC RX W HCPCS: Performed by: PHYSICIAN ASSISTANT

## 2024-02-09 PROCEDURE — 2580000003 HC RX 258: Performed by: NURSE ANESTHETIST, CERTIFIED REGISTERED

## 2024-02-09 PROCEDURE — 6370000000 HC RX 637 (ALT 250 FOR IP): Performed by: ANESTHESIOLOGY

## 2024-02-09 PROCEDURE — 2580000003 HC RX 258: Performed by: ANESTHESIOLOGY

## 2024-02-09 PROCEDURE — 2500000003 HC RX 250 WO HCPCS: Performed by: NURSE ANESTHETIST, CERTIFIED REGISTERED

## 2024-02-09 PROCEDURE — 3600000015 HC SURGERY LEVEL 5 ADDTL 15MIN: Performed by: ORTHOPAEDIC SURGERY

## 2024-02-09 PROCEDURE — 6360000002 HC RX W HCPCS: Performed by: ANESTHESIOLOGY

## 2024-02-09 PROCEDURE — 94761 N-INVAS EAR/PLS OXIMETRY MLT: CPT

## 2024-02-09 PROCEDURE — A4217 STERILE WATER/SALINE, 500 ML: HCPCS | Performed by: ORTHOPAEDIC SURGERY

## 2024-02-09 PROCEDURE — 3700000001 HC ADD 15 MINUTES (ANESTHESIA): Performed by: ORTHOPAEDIC SURGERY

## 2024-02-09 PROCEDURE — 6360000002 HC RX W HCPCS: Performed by: ORTHOPAEDIC SURGERY

## 2024-02-09 PROCEDURE — 97165 OT EVAL LOW COMPLEX 30 MIN: CPT

## 2024-02-09 PROCEDURE — 97530 THERAPEUTIC ACTIVITIES: CPT

## 2024-02-09 PROCEDURE — 2709999900 HC NON-CHARGEABLE SUPPLY: Performed by: ORTHOPAEDIC SURGERY

## 2024-02-09 PROCEDURE — 27447 TOTAL KNEE ARTHROPLASTY: CPT | Performed by: ORTHOPAEDIC SURGERY

## 2024-02-09 PROCEDURE — 2580000003 HC RX 258: Performed by: PHYSICIAN ASSISTANT

## 2024-02-09 PROCEDURE — 3600000005 HC SURGERY LEVEL 5 BASE: Performed by: ORTHOPAEDIC SURGERY

## 2024-02-09 PROCEDURE — 97161 PT EVAL LOW COMPLEX 20 MIN: CPT

## 2024-02-09 PROCEDURE — 2720000010 HC SURG SUPPLY STERILE: Performed by: ORTHOPAEDIC SURGERY

## 2024-02-09 PROCEDURE — 94760 N-INVAS EAR/PLS OXIMETRY 1: CPT

## 2024-02-09 PROCEDURE — C1776 JOINT DEVICE (IMPLANTABLE): HCPCS | Performed by: ORTHOPAEDIC SURGERY

## 2024-02-09 PROCEDURE — 97535 SELF CARE MNGMENT TRAINING: CPT

## 2024-02-09 PROCEDURE — 6370000000 HC RX 637 (ALT 250 FOR IP): Performed by: PHYSICIAN ASSISTANT

## 2024-02-09 PROCEDURE — 3700000000 HC ANESTHESIA ATTENDED CARE: Performed by: ORTHOPAEDIC SURGERY

## 2024-02-09 PROCEDURE — 6360000002 HC RX W HCPCS: Performed by: NURSE ANESTHETIST, CERTIFIED REGISTERED

## 2024-02-09 PROCEDURE — 7100000000 HC PACU RECOVERY - FIRST 15 MIN: Performed by: ORTHOPAEDIC SURGERY

## 2024-02-09 PROCEDURE — 64447 NJX AA&/STRD FEMORAL NRV IMG: CPT | Performed by: ANESTHESIOLOGY

## 2024-02-09 PROCEDURE — 20985 CPTR-ASST DIR MS PX: CPT | Performed by: ORTHOPAEDIC SURGERY

## 2024-02-09 PROCEDURE — 82962 GLUCOSE BLOOD TEST: CPT

## 2024-02-09 PROCEDURE — C1713 ANCHOR/SCREW BN/BN,TIS/BN: HCPCS | Performed by: ORTHOPAEDIC SURGERY

## 2024-02-09 PROCEDURE — 2580000003 HC RX 258: Performed by: ORTHOPAEDIC SURGERY

## 2024-02-09 PROCEDURE — 7100000001 HC PACU RECOVERY - ADDTL 15 MIN: Performed by: ORTHOPAEDIC SURGERY

## 2024-02-09 DEVICE — COMPONENT TOT KNEE CAPPED PRIMARY K2STRYKER] STRYKER CORP]: Type: IMPLANTABLE DEVICE | Status: FUNCTIONAL

## 2024-02-09 DEVICE — ASYMMETRIC PATELLA
Type: IMPLANTABLE DEVICE | Site: KNEE | Status: FUNCTIONAL
Brand: TRIATHLON

## 2024-02-09 DEVICE — POWDER SURG CELLERATE RX 1 GM HYDROL COLLEGEN: Type: IMPLANTABLE DEVICE | Site: KNEE | Status: FUNCTIONAL

## 2024-02-09 DEVICE — DEPUY CMW 2 FAST SET BONE CEMENT 20G: Type: IMPLANTABLE DEVICE | Site: KNEE | Status: FUNCTIONAL

## 2024-02-09 DEVICE — CRUCIATE RETAINING FEMORAL
Type: IMPLANTABLE DEVICE | Site: KNEE | Status: FUNCTIONAL
Brand: TRIATHLON

## 2024-02-09 DEVICE — TIBIAL COMPONENT
Type: IMPLANTABLE DEVICE | Site: KNEE | Status: FUNCTIONAL
Brand: TRIATHLON

## 2024-02-09 DEVICE — TIBIAL BEARING INSERT - CS
Type: IMPLANTABLE DEVICE | Site: KNEE | Status: FUNCTIONAL
Brand: TRIATHLON

## 2024-02-09 RX ORDER — DIPHENHYDRAMINE HYDROCHLORIDE 50 MG/ML
12.5 INJECTION INTRAMUSCULAR; INTRAVENOUS
Status: DISCONTINUED | OUTPATIENT
Start: 2024-02-09 | End: 2024-02-09 | Stop reason: HOSPADM

## 2024-02-09 RX ORDER — SENNA AND DOCUSATE SODIUM 50; 8.6 MG/1; MG/1
1 TABLET, FILM COATED ORAL 2 TIMES DAILY
Status: DISCONTINUED | OUTPATIENT
Start: 2024-02-09 | End: 2024-02-10 | Stop reason: HOSPADM

## 2024-02-09 RX ORDER — SODIUM CHLORIDE 0.9 % (FLUSH) 0.9 %
5-40 SYRINGE (ML) INJECTION PRN
Status: DISCONTINUED | OUTPATIENT
Start: 2024-02-09 | End: 2024-02-10 | Stop reason: HOSPADM

## 2024-02-09 RX ORDER — SODIUM CHLORIDE 0.9 % (FLUSH) 0.9 %
5-40 SYRINGE (ML) INJECTION PRN
Status: DISCONTINUED | OUTPATIENT
Start: 2024-02-09 | End: 2024-02-09 | Stop reason: HOSPADM

## 2024-02-09 RX ORDER — BUSPIRONE HYDROCHLORIDE 5 MG/1
5 TABLET ORAL NIGHTLY
Status: DISCONTINUED | OUTPATIENT
Start: 2024-02-09 | End: 2024-02-10 | Stop reason: HOSPADM

## 2024-02-09 RX ORDER — ONDANSETRON 2 MG/ML
4 INJECTION INTRAMUSCULAR; INTRAVENOUS
Status: DISCONTINUED | OUTPATIENT
Start: 2024-02-09 | End: 2024-02-09 | Stop reason: HOSPADM

## 2024-02-09 RX ORDER — GABAPENTIN 300 MG/1
300 CAPSULE ORAL NIGHTLY
Status: DISCONTINUED | OUTPATIENT
Start: 2024-02-09 | End: 2024-02-10 | Stop reason: HOSPADM

## 2024-02-09 RX ORDER — SODIUM CHLORIDE 9 MG/ML
INJECTION, SOLUTION INTRAVENOUS CONTINUOUS
Status: DISCONTINUED | OUTPATIENT
Start: 2024-02-09 | End: 2024-02-09 | Stop reason: HOSPADM

## 2024-02-09 RX ORDER — SODIUM CHLORIDE 9 MG/ML
INJECTION, SOLUTION INTRAVENOUS PRN
Status: DISCONTINUED | OUTPATIENT
Start: 2024-02-09 | End: 2024-02-10 | Stop reason: HOSPADM

## 2024-02-09 RX ORDER — ASPIRIN 81 MG/1
81 TABLET ORAL DAILY
Status: ON HOLD | COMMUNITY
End: 2024-02-09 | Stop reason: HOSPADM

## 2024-02-09 RX ORDER — ONDANSETRON 2 MG/ML
4 INJECTION INTRAMUSCULAR; INTRAVENOUS EVERY 6 HOURS PRN
Status: DISCONTINUED | OUTPATIENT
Start: 2024-02-09 | End: 2024-02-10 | Stop reason: HOSPADM

## 2024-02-09 RX ORDER — HYDROMORPHONE HYDROCHLORIDE 1 MG/ML
0.5 INJECTION, SOLUTION INTRAMUSCULAR; INTRAVENOUS; SUBCUTANEOUS EVERY 10 MIN PRN
Status: DISCONTINUED | OUTPATIENT
Start: 2024-02-09 | End: 2024-02-09 | Stop reason: HOSPADM

## 2024-02-09 RX ORDER — ROPIVACAINE HYDROCHLORIDE 2 MG/ML
INJECTION, SOLUTION EPIDURAL; INFILTRATION; PERINEURAL PRN
Status: DISCONTINUED | OUTPATIENT
Start: 2024-02-09 | End: 2024-02-09 | Stop reason: ALTCHOICE

## 2024-02-09 RX ORDER — ACETAMINOPHEN 500 MG
1000 TABLET ORAL ONCE
Status: COMPLETED | OUTPATIENT
Start: 2024-02-09 | End: 2024-02-09

## 2024-02-09 RX ORDER — BUPROPION HYDROCHLORIDE 100 MG/1
100 TABLET, EXTENDED RELEASE ORAL DAILY
Status: DISCONTINUED | OUTPATIENT
Start: 2024-02-09 | End: 2024-02-10 | Stop reason: HOSPADM

## 2024-02-09 RX ORDER — SODIUM CHLORIDE 0.9 % (FLUSH) 0.9 %
5-40 SYRINGE (ML) INJECTION EVERY 12 HOURS SCHEDULED
Status: DISCONTINUED | OUTPATIENT
Start: 2024-02-09 | End: 2024-02-10 | Stop reason: HOSPADM

## 2024-02-09 RX ORDER — FENTANYL CITRATE 50 UG/ML
100 INJECTION, SOLUTION INTRAMUSCULAR; INTRAVENOUS
Status: COMPLETED | OUTPATIENT
Start: 2024-02-09 | End: 2024-02-09

## 2024-02-09 RX ORDER — ALPRAZOLAM 0.5 MG/1
1 TABLET ORAL 3 TIMES DAILY PRN
Status: DISCONTINUED | OUTPATIENT
Start: 2024-02-09 | End: 2024-02-10 | Stop reason: HOSPADM

## 2024-02-09 RX ORDER — ZOLPIDEM TARTRATE 5 MG/1
5 TABLET ORAL NIGHTLY PRN
Status: DISCONTINUED | OUTPATIENT
Start: 2024-02-09 | End: 2024-02-10 | Stop reason: HOSPADM

## 2024-02-09 RX ORDER — OXYCODONE HYDROCHLORIDE 5 MG/1
10 TABLET ORAL PRN
Status: COMPLETED | OUTPATIENT
Start: 2024-02-09 | End: 2024-02-09

## 2024-02-09 RX ORDER — ACETAMINOPHEN 500 MG
1000 TABLET ORAL EVERY 6 HOURS
Status: DISCONTINUED | OUTPATIENT
Start: 2024-02-09 | End: 2024-02-10 | Stop reason: HOSPADM

## 2024-02-09 RX ORDER — METHOCARBAMOL 750 MG/1
750 TABLET, FILM COATED ORAL 3 TIMES DAILY PRN
Status: DISCONTINUED | OUTPATIENT
Start: 2024-02-09 | End: 2024-02-10 | Stop reason: HOSPADM

## 2024-02-09 RX ORDER — LOSARTAN POTASSIUM 50 MG/1
100 TABLET ORAL DAILY
Status: DISCONTINUED | OUTPATIENT
Start: 2024-02-10 | End: 2024-02-10 | Stop reason: HOSPADM

## 2024-02-09 RX ORDER — OXYCODONE HYDROCHLORIDE 5 MG/1
5 TABLET ORAL PRN
Status: COMPLETED | OUTPATIENT
Start: 2024-02-09 | End: 2024-02-09

## 2024-02-09 RX ORDER — SODIUM CHLORIDE, SODIUM LACTATE, POTASSIUM CHLORIDE, CALCIUM CHLORIDE 600; 310; 30; 20 MG/100ML; MG/100ML; MG/100ML; MG/100ML
INJECTION, SOLUTION INTRAVENOUS CONTINUOUS
Status: DISCONTINUED | OUTPATIENT
Start: 2024-02-09 | End: 2024-02-09

## 2024-02-09 RX ORDER — HYDROMORPHONE HYDROCHLORIDE 1 MG/ML
0.25 INJECTION, SOLUTION INTRAMUSCULAR; INTRAVENOUS; SUBCUTANEOUS EVERY 5 MIN PRN
Status: DISCONTINUED | OUTPATIENT
Start: 2024-02-09 | End: 2024-02-09 | Stop reason: HOSPADM

## 2024-02-09 RX ORDER — ROPIVACAINE HYDROCHLORIDE 2 MG/ML
INJECTION, SOLUTION EPIDURAL; INFILTRATION; PERINEURAL
Status: COMPLETED | OUTPATIENT
Start: 2024-02-09 | End: 2024-02-09

## 2024-02-09 RX ORDER — ASPIRIN 81 MG/1
81 TABLET ORAL 2 TIMES DAILY
Status: DISCONTINUED | OUTPATIENT
Start: 2024-02-09 | End: 2024-02-10 | Stop reason: HOSPADM

## 2024-02-09 RX ORDER — FAMOTIDINE 20 MG/1
20 TABLET, FILM COATED ORAL ONCE
Status: COMPLETED | OUTPATIENT
Start: 2024-02-09 | End: 2024-02-09

## 2024-02-09 RX ORDER — SODIUM CHLORIDE 9 MG/ML
INJECTION, SOLUTION INTRAVENOUS PRN
Status: DISCONTINUED | OUTPATIENT
Start: 2024-02-09 | End: 2024-02-09 | Stop reason: HOSPADM

## 2024-02-09 RX ORDER — SODIUM CHLORIDE 0.9 % (FLUSH) 0.9 %
5-40 SYRINGE (ML) INJECTION EVERY 12 HOURS SCHEDULED
Status: DISCONTINUED | OUTPATIENT
Start: 2024-02-09 | End: 2024-02-09 | Stop reason: HOSPADM

## 2024-02-09 RX ORDER — ACETAMINOPHEN 500 MG
1000 TABLET ORAL ONCE
Status: DISCONTINUED | OUTPATIENT
Start: 2024-02-09 | End: 2024-02-09

## 2024-02-09 RX ORDER — DIPHENHYDRAMINE HYDROCHLORIDE 50 MG/ML
25 INJECTION INTRAMUSCULAR; INTRAVENOUS EVERY 6 HOURS PRN
Status: DISCONTINUED | OUTPATIENT
Start: 2024-02-09 | End: 2024-02-10 | Stop reason: HOSPADM

## 2024-02-09 RX ORDER — SODIUM CHLORIDE 9 MG/ML
INJECTION, SOLUTION INTRAVENOUS PRN
Status: DISCONTINUED | OUTPATIENT
Start: 2024-02-09 | End: 2024-02-09

## 2024-02-09 RX ORDER — ESCITALOPRAM OXALATE 10 MG/1
20 TABLET ORAL DAILY
Status: DISCONTINUED | OUTPATIENT
Start: 2024-02-10 | End: 2024-02-10 | Stop reason: HOSPADM

## 2024-02-09 RX ORDER — DIPHENHYDRAMINE HCL 25 MG
25 CAPSULE ORAL EVERY 6 HOURS PRN
Status: DISCONTINUED | OUTPATIENT
Start: 2024-02-09 | End: 2024-02-10 | Stop reason: HOSPADM

## 2024-02-09 RX ORDER — HYDROMORPHONE HYDROCHLORIDE 1 MG/ML
INJECTION, SOLUTION INTRAMUSCULAR; INTRAVENOUS; SUBCUTANEOUS PRN
Status: DISCONTINUED | OUTPATIENT
Start: 2024-02-09 | End: 2024-02-09 | Stop reason: SDUPTHER

## 2024-02-09 RX ORDER — KETOROLAC TROMETHAMINE 30 MG/ML
INJECTION, SOLUTION INTRAMUSCULAR; INTRAVENOUS PRN
Status: DISCONTINUED | OUTPATIENT
Start: 2024-02-09 | End: 2024-02-09 | Stop reason: ALTCHOICE

## 2024-02-09 RX ORDER — PROPOFOL 10 MG/ML
INJECTION, EMULSION INTRAVENOUS PRN
Status: DISCONTINUED | OUTPATIENT
Start: 2024-02-09 | End: 2024-02-09 | Stop reason: SDUPTHER

## 2024-02-09 RX ORDER — AMLODIPINE BESYLATE 5 MG/1
5 TABLET ORAL DAILY
Status: DISCONTINUED | OUTPATIENT
Start: 2024-02-10 | End: 2024-02-10 | Stop reason: HOSPADM

## 2024-02-09 RX ORDER — DEXAMETHASONE SODIUM PHOSPHATE 10 MG/ML
INJECTION, SOLUTION INTRAMUSCULAR; INTRAVENOUS
Status: COMPLETED | OUTPATIENT
Start: 2024-02-09 | End: 2024-02-09

## 2024-02-09 RX ORDER — ALBUTEROL SULFATE 2.5 MG/3ML
2.5 SOLUTION RESPIRATORY (INHALATION) EVERY 6 HOURS PRN
Status: DISCONTINUED | OUTPATIENT
Start: 2024-02-09 | End: 2024-02-10 | Stop reason: HOSPADM

## 2024-02-09 RX ORDER — TRANEXAMIC ACID 100 MG/ML
INJECTION, SOLUTION INTRAVENOUS PRN
Status: DISCONTINUED | OUTPATIENT
Start: 2024-02-09 | End: 2024-02-09 | Stop reason: SDUPTHER

## 2024-02-09 RX ORDER — ONDANSETRON 4 MG/1
4 TABLET, ORALLY DISINTEGRATING ORAL EVERY 8 HOURS PRN
Status: DISCONTINUED | OUTPATIENT
Start: 2024-02-09 | End: 2024-02-10 | Stop reason: HOSPADM

## 2024-02-09 RX ORDER — MIDAZOLAM HYDROCHLORIDE 2 MG/2ML
2 INJECTION, SOLUTION INTRAMUSCULAR; INTRAVENOUS
Status: COMPLETED | OUTPATIENT
Start: 2024-02-09 | End: 2024-02-09

## 2024-02-09 RX ORDER — OXYCODONE HYDROCHLORIDE 5 MG/1
10 TABLET ORAL EVERY 4 HOURS PRN
Status: DISCONTINUED | OUTPATIENT
Start: 2024-02-09 | End: 2024-02-10 | Stop reason: HOSPADM

## 2024-02-09 RX ORDER — LIDOCAINE HYDROCHLORIDE 10 MG/ML
1 INJECTION, SOLUTION INFILTRATION; PERINEURAL
Status: DISCONTINUED | OUTPATIENT
Start: 2024-02-09 | End: 2024-02-09 | Stop reason: HOSPADM

## 2024-02-09 RX ORDER — SODIUM CHLORIDE, SODIUM LACTATE, POTASSIUM CHLORIDE, CALCIUM CHLORIDE 600; 310; 30; 20 MG/100ML; MG/100ML; MG/100ML; MG/100ML
INJECTION, SOLUTION INTRAVENOUS CONTINUOUS
Status: DISCONTINUED | OUTPATIENT
Start: 2024-02-09 | End: 2024-02-09 | Stop reason: HOSPADM

## 2024-02-09 RX ORDER — HYDROMORPHONE HYDROCHLORIDE 1 MG/ML
1 INJECTION, SOLUTION INTRAMUSCULAR; INTRAVENOUS; SUBCUTANEOUS
Status: DISCONTINUED | OUTPATIENT
Start: 2024-02-09 | End: 2024-02-10 | Stop reason: HOSPADM

## 2024-02-09 RX ADMIN — PHENYLEPHRINE HYDROCHLORIDE 100 MCG: 0.1 INJECTION, SOLUTION INTRAVENOUS at 13:12

## 2024-02-09 RX ADMIN — ACETAMINOPHEN 1000 MG: 500 TABLET, FILM COATED ORAL at 18:31

## 2024-02-09 RX ADMIN — PROPOFOL 10 MG: 10 INJECTION, EMULSION INTRAVENOUS at 11:10

## 2024-02-09 RX ADMIN — TRANEXAMIC ACID 1000 MG: 100 INJECTION, SOLUTION INTRAVENOUS at 12:58

## 2024-02-09 RX ADMIN — SODIUM CHLORIDE, SODIUM LACTATE, POTASSIUM CHLORIDE, AND CALCIUM CHLORIDE: 600; 310; 30; 20 INJECTION, SOLUTION INTRAVENOUS at 11:39

## 2024-02-09 RX ADMIN — PHENYLEPHRINE HYDROCHLORIDE 100 MCG: 0.1 INJECTION, SOLUTION INTRAVENOUS at 11:35

## 2024-02-09 RX ADMIN — ASPIRIN 81 MG: 81 TABLET, COATED ORAL at 21:49

## 2024-02-09 RX ADMIN — ROPIVACAINE HYDROCHLORIDE 20 ML: 2 INJECTION, SOLUTION EPIDURAL; INFILTRATION at 10:21

## 2024-02-09 RX ADMIN — BUSPIRONE HYDROCHLORIDE 5 MG: 5 TABLET ORAL at 21:48

## 2024-02-09 RX ADMIN — TRANEXAMIC ACID 1000 MG: 100 INJECTION, SOLUTION INTRAVENOUS at 11:21

## 2024-02-09 RX ADMIN — ACETAMINOPHEN 1000 MG: 500 TABLET, FILM COATED ORAL at 08:53

## 2024-02-09 RX ADMIN — OXYCODONE HYDROCHLORIDE 10 MG: 5 TABLET ORAL at 22:26

## 2024-02-09 RX ADMIN — PHENYLEPHRINE HYDROCHLORIDE 100 MCG: 0.1 INJECTION, SOLUTION INTRAVENOUS at 11:54

## 2024-02-09 RX ADMIN — OXYCODONE HYDROCHLORIDE 10 MG: 5 TABLET ORAL at 18:31

## 2024-02-09 RX ADMIN — PHENYLEPHRINE HYDROCHLORIDE 200 MCG: 0.1 INJECTION, SOLUTION INTRAVENOUS at 11:48

## 2024-02-09 RX ADMIN — SENNOSIDES AND DOCUSATE SODIUM 1 TABLET: 50; 8.6 TABLET ORAL at 21:48

## 2024-02-09 RX ADMIN — GABAPENTIN 300 MG: 300 CAPSULE ORAL at 21:48

## 2024-02-09 RX ADMIN — MEPIVACAINE HYDROCHLORIDE 60 MG: 20 INJECTION, SOLUTION EPIDURAL; INFILTRATION at 10:04

## 2024-02-09 RX ADMIN — HYDROMORPHONE HYDROCHLORIDE 0.5 MG: 1 INJECTION, SOLUTION INTRAMUSCULAR; INTRAVENOUS; SUBCUTANEOUS at 13:54

## 2024-02-09 RX ADMIN — DEXAMETHASONE SODIUM PHOSPHATE 5 MG: 10 INJECTION, SOLUTION INTRAMUSCULAR; INTRAVENOUS at 10:21

## 2024-02-09 RX ADMIN — MIDAZOLAM 2 MG: 1 INJECTION INTRAMUSCULAR; INTRAVENOUS at 10:24

## 2024-02-09 RX ADMIN — ACETAMINOPHEN 1000 MG: 500 TABLET, FILM COATED ORAL at 22:26

## 2024-02-09 RX ADMIN — PROPOFOL 70 MCG/KG/MIN: 10 INJECTION, EMULSION INTRAVENOUS at 11:11

## 2024-02-09 RX ADMIN — PROPOFOL 30 MG: 10 INJECTION, EMULSION INTRAVENOUS at 13:10

## 2024-02-09 RX ADMIN — HYDROMORPHONE HYDROCHLORIDE 0.6 MG: 1 INJECTION, SOLUTION INTRAMUSCULAR; INTRAVENOUS; SUBCUTANEOUS at 13:38

## 2024-02-09 RX ADMIN — PHENYLEPHRINE HYDROCHLORIDE 100 MCG: 0.1 INJECTION, SOLUTION INTRAVENOUS at 12:21

## 2024-02-09 RX ADMIN — FENTANYL CITRATE 25 MCG: 50 INJECTION INTRAMUSCULAR; INTRAVENOUS at 10:24

## 2024-02-09 RX ADMIN — CEFAZOLIN 3000 MG: 10 INJECTION, POWDER, FOR SOLUTION INTRAVENOUS at 20:18

## 2024-02-09 RX ADMIN — PROPOFOL 10 MG: 10 INJECTION, EMULSION INTRAVENOUS at 11:17

## 2024-02-09 RX ADMIN — FAMOTIDINE 20 MG: 20 TABLET, FILM COATED ORAL at 08:53

## 2024-02-09 RX ADMIN — PHENYLEPHRINE HYDROCHLORIDE 100 MCG: 0.1 INJECTION, SOLUTION INTRAVENOUS at 11:12

## 2024-02-09 RX ADMIN — OXYCODONE 5 MG: 5 TABLET ORAL at 14:02

## 2024-02-09 RX ADMIN — SODIUM CHLORIDE, SODIUM LACTATE, POTASSIUM CHLORIDE, AND CALCIUM CHLORIDE: 600; 310; 30; 20 INJECTION, SOLUTION INTRAVENOUS at 08:54

## 2024-02-09 RX ADMIN — Medication 3000 MG: at 11:12

## 2024-02-09 RX ADMIN — PHENYLEPHRINE HYDROCHLORIDE 100 MCG: 0.1 INJECTION, SOLUTION INTRAVENOUS at 11:24

## 2024-02-09 RX ADMIN — PHENYLEPHRINE HYDROCHLORIDE 20 MCG/MIN: 10 INJECTION INTRAVENOUS at 11:55

## 2024-02-09 ASSESSMENT — PAIN DESCRIPTION - ORIENTATION
ORIENTATION: LEFT

## 2024-02-09 ASSESSMENT — PAIN DESCRIPTION - DESCRIPTORS
DESCRIPTORS: ACHING;DULL
DESCRIPTORS: ACHING
DESCRIPTORS: ACHING;DISCOMFORT

## 2024-02-09 ASSESSMENT — PAIN - FUNCTIONAL ASSESSMENT
PAIN_FUNCTIONAL_ASSESSMENT: PREVENTS OR INTERFERES SOME ACTIVE ACTIVITIES AND ADLS
PAIN_FUNCTIONAL_ASSESSMENT: 0-10

## 2024-02-09 ASSESSMENT — PAIN SCALES - GENERAL
PAINLEVEL_OUTOF10: 7
PAINLEVEL_OUTOF10: 5
PAINLEVEL_OUTOF10: 4
PAINLEVEL_OUTOF10: 5
PAINLEVEL_OUTOF10: 4
PAINLEVEL_OUTOF10: 6
PAINLEVEL_OUTOF10: 2
PAINLEVEL_OUTOF10: 3
PAINLEVEL_OUTOF10: 1
PAINLEVEL_OUTOF10: 5
PAINLEVEL_OUTOF10: 2
PAINLEVEL_OUTOF10: 4

## 2024-02-09 ASSESSMENT — PAIN DESCRIPTION - LOCATION
LOCATION: KNEE

## 2024-02-09 ASSESSMENT — KOOS JR
RISING FROM SITTING: 1
KOOS JR TOTAL INTERVAL SCORE: 68.284
STANDING UPRIGHT: 1
HOW SEVERE IS YOUR KNEE STIFFNESS AFTER FIRST WAKING IN MORNING: 1
GOING UP OR DOWN STAIRS: 1
STRAIGHTENING KNEE FULLY: 1
TWISING OR PIVOTING ON KNEE: 1
BENDING TO THE FLOOR TO PICK UP OBJECT: 1

## 2024-02-09 NOTE — ANESTHESIA PRE PROCEDURE
BILITOT 0.8 06/22/2020 09:00 AM    ALKPHOS 81 06/22/2020 09:00 AM    AST 25 06/22/2020 09:00 AM    ALT 45 06/22/2020 09:00 AM       POC Tests:   Recent Labs     02/09/24  0848   POCGLU 116*       Coags:   Lab Results   Component Value Date/Time    PROTIME 13.8 01/30/2024 07:59 AM    INR 1.1 01/30/2024 07:59 AM    APTT 28.4 01/30/2024 07:59 AM       HCG (If Applicable): No results found for: \"PREGTESTUR\", \"PREGSERUM\", \"HCG\", \"HCGQUANT\"     ABGs: No results found for: \"PHART\", \"PO2ART\", \"YHP3BLB\", \"WXU1YPV\", \"BEART\", \"A7OFXIPU\"     Type & Screen (If Applicable):  No results found for: \"LABABO\", \"LABRH\"    Drug/Infectious Status (If Applicable):  No results found for: \"HIV\", \"HEPCAB\"    COVID-19 Screening (If Applicable): No results found for: \"COVID19\"        Anesthesia Evaluation  Patient summary reviewed  Airway: Mallampati: II          Dental:      Comment: Front left tooth chipped  Few back missing    Pulmonary:Negative Pulmonary ROS breath sounds clear to auscultation                             Cardiovascular:  Exercise tolerance: good (>4 METS)  (+) hypertension:, hyperlipidemia        Rhythm: regular  Rate: normal                 ROS comment: ·  Left Ventricle: Normal left ventricular systolic function with a visually estimated EF of 55 - 60%. Left ventricle size is normal. Mildly increased wall thickness. Normal wall motion. Abnormal diastolic function.  ·  Aortic Valve: Mild sclerosis of the aortic valve cusp. Mild stenosis of the aortic valve. AV mean gradient is 9 mmHg.    12/20    Remote cath nl per pt       Neuro/Psych:   (+) depression/anxiety             GI/Hepatic/Renal:   (+) renal disease (CR 1.35): CRI, morbid obesity     (-) GERD       Endo/Other:    (+) DiabetesType II DM, well controlled.                  ROS comment: Spinal stenosis Abdominal:             Vascular:          Other Findings:             Anesthesia Plan      spinal     ASA 3     (OETT discussed as alternative)  Induction:

## 2024-02-09 NOTE — OP NOTE
[facility]  Edilberto Robotic Assisted Total Knee Arthroplasty: Posterior Cruciate Retaining       Patient:Gilbert Yo   : 1960  Medical Record Number:[id number]  Pre-operative Diagnosis:  Primary osteoarthritis of left knee [M17.12]  Post-operative Diagnosis: Primary osteoarthritis of left knee [M17.12]  Location: [facility]  Surgeon: Hal Dominguez MD   Assistant: Paddy Cote    Anesthesia: Spinal and ACB    Indications: Patient has end stage arthritis. They have tried and failed conservative management.    Procedure:Procedure(s) (LRB):  lt KNEE TOTAL ARTHROPLASTY ROBOTIC (Left)            CPT- 17987- Total knee arthroplasty           83284- Other procedures on musculoskeletal system            The complexity of the total joint surgery requires the use of a first assistant for positioning, retraction and expertise in closure.     Tourniquet Time: 0 minutes  EBL: 250 cc  Findings: severe degenerative arthritis with loss of cartilage in medial weight bearing compartment of the knee,  patellar osteophytes with loss of patella cartilage, posterior femoral osteophytes.  The patient had a previous left patellar tendon rupture which was repaired with Ethibond suture.  It appeared to be intact.  BMI: Body mass index is 41.62 kg/m².    Gilbert Yo was brought to the operating room and positioned on the operating table.  He was anesthestized with anesthesia. IV antibiotics were administered. Prior to the incision being made a timeout was called identifying the patient, procedure ,operative side and surgeon The operative leg was prepped and draped in the usual sterile manner.  An anterior longitudinal incision was accomplished just medial to the tibial tubercle and extending approximal 6 centimeters proximal to the superior pole of the patella.  A medial parapatellar capsular incision was performed. The medial capsular flap was elevated around to the insertion of the semimembranous  place.  The femoral component was impacted into place. The patella component was cemented into place.    Gilbert Yo knee was placed through range of motion and noted to be stable as mentioned above with the trail components. The wound was dry, therefore no drain was used. The operative knee was injected with 60 cc of Naropin and 1 cc of 30 mg of Toradol.      The knee was then soaked with a diluted betadine solution for approximately 3 min. This was then thoroughly irrigated with 3000 cc of sterile saline.      The capsular layer was closed using a #1 Stratafix suture and interrupted #2 Ethibond suture. Then, 1 gram (100 mg/ml) of Transexamic Acid was injected into the joint space. The subcutaneous layers were closed using 2-0 Monocryl.  Finally the skin was closed using 3-0 Monocryl and staples which were applied in occlusive fashion and sterile bandage applied.  An Iceman cryo pad was applied on the operative leg.  Sponge count and needle counts were correct.  Gilbert Yo left the operating room     Implants:   Implant Name Type Inv. Item Serial No.  Lot No. LRB No. Used Action   CEMENT BNE 20GM HALF DOSE PMMA VISC RADPQ FAST - [id number]  CEMENT BNE 20GM HALF DOSE PMMA VISC RADPQ FAST  [employer]- [id number] Left 1 Implanted   COMPONENT FEM SZ 5 L KNEE BETSY APATITE CRUCE RET CEMENTLESS - [id number]  COMPONENT FEM SZ 5 L KNEE BETSY APATITE CRUCE RET CEMENTLESS  [employer] [facility] [id number] Left 1 Implanted   BASEPLATE TIB SZ 6 AP52MM ML77MM KNEE TRITANIUM 4 CRUCFRM - [id number]  BASEPLATE TIB SZ 6 AP52MM ML77MM KNEE TRITANIUM 4 CRUCFRM  PER [employer] [facility] [id number] Left 1 Implanted   IMPLANT PATELLAR [id number] X3 ASYMMETRIC TRIATHLON - [id number]  IMPLANT PATELLAR [id number] X3 ASYMMETRIC TRIATHLON  PER [employer] [facility] [id number] Left 1 Implanted   INSERT TIB CNDYL STBL 6 9 MM KNEE 5/PK X3 TRIATHLON -

## 2024-02-09 NOTE — CARE COORDINATION
Patient is a 63 y.o. year old male admitted for Left TKA . Patient plans to return home on discharge. Pt was planning to go home alone with parents checking on him as they live close by. I have encouraged pt to have someone stay with him initially.   Order received to arrange home health. Patient without preference towards agency. Referral sent to [facility].  Patient requesting we arrange a HD walker. Pt without preference towards provider. Referral sent to [facility]. Equipment delivered to the hospital room prior to discharge. Will follow until discharge.      02/09/24 7293   Service Assessment   Patient Orientation Alert and Oriented   Cognition Alert   History Provided By Patient   Services At/After Discharge   Transition of Care Consult (CM Consult) Home Health   Internal Home Health Yes   Services At/After Discharge Home Health;PT   Mode of Transport at Discharge Self   Confirm Follow Up Transport Self   Condition of Participation: Discharge Planning   The Plan for Transition of Care is related to the following treatment goals: improve mobility   The Patient and/or Patient Representative was provided with a Choice of Provider? Patient   The Patient and/Or Patient Representative agree with the Discharge Plan? Yes   Freedom of Choice list was provided with basic dialogue that supports the patient's individualized plan of care/goals, treatment preferences, and shares the quality data associated with the providers?  Yes

## 2024-02-09 NOTE — ANESTHESIA POSTPROCEDURE EVALUATION
Department of Anesthesiology  Postprocedure Note    Patient: Gilbert Yo  MRN: [id number]  YOB: 1960  Date of evaluation: 2/9/2024    Procedure Summary       Date: 02/09/24 [place] [facility] [place] [facility] [place]    Anesthesia Start: 1055 Anesthesia Stop: 1348    Procedure: lt KNEE TOTAL ARTHROPLASTY ROBOTIC (Left: Knee) Diagnosis:       Primary osteoarthritis of left knee      (Primary osteoarthritis of left knee [M17.12])    Surgeons: Hal Dominguez Jr., MD Responsible Provider: Valorie Montano MD    Anesthesia Type: spinal ASA Status: 3            Anesthesia Type: No value filed.    Lucia Phase I: Lucia Score: 8    Lucia Phase II:      Anesthesia Post Evaluation    Patient location during evaluation: PACU  Patient participation: complete - patient participated  Level of consciousness: awake and alert  Airway patency: patent  Nausea & Vomiting: no nausea and no vomiting  Cardiovascular status: hemodynamically stable  Respiratory status: acceptable, nonlabored ventilation and spontaneous ventilation  Hydration status: euvolemic  Comments: /73   Pulse (!) 103   Temp 97.4 °F (36.3 °C) (Infrared)   Resp 17   Ht 1.88 m (6' 2.02\")   Wt (!) 147.1 kg (324 lb 4.8 oz)   SpO2 95%   BMI 41.62 kg/m²     Multimodal analgesia pain management approach  Pain management: adequate and satisfactory to patient    No notable events documented.

## 2024-02-09 NOTE — PROGRESS NOTES
02/09/24 1732   Treatment   Treatment Type IS   Oxygen Therapy/Pulse Ox   O2 Therapy Room air   Pulse 85   Respirations 17   SpO2 94 %   $Pulse Oximeter $Spot check (single)   Incentive Spirometry Tx   Treatment Effort Assisted by RT

## 2024-02-09 NOTE — PERIOP NOTE
MD Barnes at bedside with patient. Pt VSS stable. Pain and Nausea controlled at this time. Verbal sign out per MD when pacu care is completed. Plan of care continues.

## 2024-02-09 NOTE — ANESTHESIA PROCEDURE NOTES
Spinal Block    Patient location during procedure: OR  End time: 2/9/2024 11:07 AM  Reason for block: primary anesthetic and at surgeon's request  Staffing  Performed: anesthesiologist   Anesthesiologist: Valorie Montano MD  Performed by: Valorie Montano MD  Authorized by: Valorie Montano MD    Spinal Block  Patient position: sitting  Prep: ChloraPrep  Patient monitoring: cardiac monitor, continuous pulse ox, frequent blood pressure checks and oxygen  Location: L3/L4  Provider prep: mask and sterile gloves  Needle  Needle type: Pencan   Needle gauge: 25 G  Needle length: 3.5 in  Assessment  Events: cerebrospinal fluid  Swirl obtained: Yes  CSF: clear  Attempts: 1  Preanesthetic Checklist  Completed: patient identified, IV checked, site marked, risks and benefits discussed, surgical/procedural consents, equipment checked, pre-op evaluation, timeout performed, anesthesia consent given, oxygen available and monitors applied/VS acknowledged

## 2024-02-09 NOTE — INTERVAL H&P NOTE
Update History & Physical    The patient's History and Physical of February 1, 2024 was reviewed with the patient and I examined the patient. There was no change. The surgical site was confirmed by the patient and me.     Plan: The risks, benefits, expected outcome, and alternative to the recommended procedure have been discussed with the patient. Patient understands and wants to proceed with the procedure.     Electronically signed by JUAN DIEGO NAIR JR, MD on 2/9/2024 at 9:56 AM

## 2024-02-09 NOTE — PERIOP NOTE
TRANSFER - OUT REPORT:    Verbal report given to Dari Humphrey RN on Gilbert Yo  being transferred to Room 334 for routine progression of patient care       Report consisted of patient's Situation, Background, Assessment and   Recommendations(SBAR).     Information from the following report(s) Nurse Handoff Report, Adult Overview, and MAR was reviewed with the receiving nurse.           Lines:   Peripheral IV 02/09/24 Posterior;Right Hand (Active)   Site Assessment Clean, dry & intact 02/09/24 1348   Line Status Infusing 02/09/24 1348   Line Care Connections checked and tightened 02/09/24 1348   Phlebitis Assessment No symptoms 02/09/24 1348   Infiltration Assessment 0 02/09/24 1348   Alcohol Cap Used No 02/09/24 1348   Dressing Status Clean, dry & intact 02/09/24 1348   Dressing Type Transparent 02/09/24 1348        Opportunity for questions and clarification was provided.      Patient transported with:  O2 @ 0lpm

## 2024-02-09 NOTE — PROGRESS NOTES
Functional Limits   Abnormal   Comments   Strength [] [x]  Decreased but funcitonal   Range of Motion [] [x] AROM LLE (degrees)  L Knee Flexion (0-145): 83  L Knee Extension (0): 0     UPPER EXTREMITY GROSS EVALUATION:     Within Functional Limits   Abnormal   Comments   Strength [x] []    Range of Motion [x] []      SENSATION  Sensation [x]  grossly     COGNITION/  PERCEPTION: Intact Impaired (Comments):   Orientation [x]     Vision [x]     Hearing [x]     Cognition  [x]       MOBILITY: I Mod I S SBA CGA Min Mod Max Total  NT x2 Comments:   Bed Mobility    Rolling [] [] [] [] [x] [] [] [] [] [] []    Supine to Sit [] [] [] [] [x] [] [] [] [] [] []    Scooting [] [] [] [] [x] [] [] [] [] [] []    Sit to Supine [] [] [] [] [x] [] [] [] [] [] []    Transfers    Sit to Stand [] [] [] [] [x] [] [] [] [] [] []    Bed to Chair [] [] [] [] [x] [] [] [] [] [] [] With walker   Stand to Sit [] [] [] [] [x] [] [] [] [] [] []    Stand Pivot [] [] [] [] [x] [] [] [] [] [] []    Toilet [] [] [] [] [] [] [] [] [] [] []     [] [] [] [] [] [] [] [] [] [] []    I=Independent, Mod I=Modified Independent, S=Supervision, SBA=Standby Assistance, CGA=Contact Guard Assistance,   Min=Minimal Assistance, Mod=Moderate Assistance, Max=Maximal Assistance, Total=Total Assistance, NT=Not Tested    BALANCE: Good Fair+ Fair Fair- Poor NT Comments   Sitting Static [] [] [x] [] [] []    Sitting Dynamic [] [] [] [] [] []              Standing Static [] [] [x] [x] [] [] With walker   Standing Dynamic [] [] [x] [x] [] [] With walker     PLAN:   FREQUENCY AND DURATION: BID for duration of hospital stay or until stated goals are met, whichever comes first.    THERAPY PROGNOSIS: Good    PROBLEM LIST:   (Skilled intervention is medically necessary to address:)  Decreased ADL/Functional Activities, Decreased Activity Tolerance, Decreased AROM/PROM, Decreased Gait Ability, Decreased Strength, Decreased Transfer Abilities, and Increased Pain   INTERVENTIONS  PLANNED:   (Benefits and precautions of physical therapy have been discussed with the patient.)  Therapeutic Activity, Therapeutic Exercise/HEP, Gait Training, Modalities, and Education       TREATMENT:   EVALUATION: LOW COMPLEXITY: (Untimed Charge)    TREATMENT:   Therapeutic Activity (27 Minutes): Therapeutic activity included TKA exercises, review of PT role, POC & PT expectations for DC, reviewed diagonal wt shift to reduce the risk of a blood clot & for prep to wean off walker, reviewed the use of cryotherapy for pain & edema control, progressive gait training an additional 100 ft after an initial 50 ft gait assessment & reviewed in house safety along with expected safety measures for the home environment to improve functional Activity tolerance, Balance, Coordination, Mobility, Strength, and ROM.    TREATMENT GRID:  THERAPEUTIC  EXERCISES: DATE:  2/9 DATE:   DATE:      AM PM AM PM AM PM    [] [] [] [] [] []   Ankle Pumps  20       Quad Sets  20       Gluteal Sets  20       Hip Abd/ADduction  20       Straight Leg Raises  20       Knee Slides  20       Short Arc Quads  20       Chair Slides  20                         B = bilateral; AA = active assistive; A = active; P = passive      EDUCATION:  [x] Home Exercises  [x] Fall Precautions  [x] No Pillow Under Knee  [x] D/C Instruction Review [x] Cryocuff  [x] Walker Management/Safety  [] Adaptive Equipment as Needed     Interdisciplinary Patient Education   (Reference education tab)    AFTER TREATMENT PRECAUTIONS: Bed/Chair Locked, Call light within reach, Chair, Needs within reach, RN notified, and Visitors at bedside    INTERDISCIPLINARY COLLABORATION:  RN/ PCT    COMPLIANCE WITH PROGRAM/EXERCISE: Will assess as treatment progresses.    RECOMMENDATIONS/INTENT FOR NEXT TREATMENT SESSION: Treatment next visit will focus on increasing Mr. Khans independence with bed mobility, transfers, gait training, strength/ROM exercises, modalities for pain, and patient

## 2024-02-09 NOTE — PROGRESS NOTES
OCCUPATIONAL THERAPY Initial Assessment, Daily Note, and PM      (Link to Caseload Tracking: OT Visit Days: 1  OT Orders   Time  OT Charge Capture  Rehab Caseload Tracker  Episode     Gilbert Yo is a 63 y.o. male   PRIMARY DIAGNOSIS: Status post left knee replacement  Primary osteoarthritis of left knee [M17.12]  Procedure(s) (LRB):  lt KNEE TOTAL ARTHROPLASTY ROBOTIC (Left)  Day of Surgery  Reason for Referral: Pain in Left Knee (M25.562)  Stiffness of Left Knee, Not elsewhere classified (M25.662)  Outpatient in a bed: Payor: [facility] / Plan: [facility] [place] / Product Type: *No Product type* /     ASSESSMENT:     REHAB RECOMMENDATIONS:   Recommendation to date pending progress:  Setting:  No further skilled occupational therapy after discharge from hospital    Equipment:    Rolling Walker     ASSESSMENT:  Mr. Yo is s/p left TKA and presents with decreased independence with functional mobility and activities of daily living as compared to baseline level of function and safety. Patient would benefit from skilled Occupational Therapy to maximize independence and safety with self-care task and functional mobility.   Patient able to complete  lower body dressing, upper body dressing, toileting, and grooming at bathroom with minimal assist .  Mobilized from hospital bed to bathroom then around room to recliner chair using a rolling walker with assist. Patient is hopeful to spend the night to better prepare for safe discharge home. He will have support of parents and son once home.       [facility] AM-PAC™ “6 Clicks” Daily Activity Inpatient Short Form:     AM-PAC Daily Activity - Inpatient   How much help is needed for putting on and taking off regular lower body clothing?: A Lot  How much help is needed for bathing (which includes washing, rinsing, drying)?: A Lot  How much help is needed for toileting (which includes using toilet, bedpan, or urinal)?: A Little  How much help is needed for putting

## 2024-02-09 NOTE — ANESTHESIA PROCEDURE NOTES
Peripheral Block    Patient location during procedure: pre-op  Reason for block: post-op pain management and at surgeon's request  Start time: 2/9/2024 10:21 AM  End time: 2/9/2024 10:22 AM  Staffing  Performed: anesthesiologist   Anesthesiologist: Valorie Montano MD  Performed by: Valorie Montano MD  Authorized by: Valorie Montano MD    Preanesthetic Checklist  Completed: patient identified, IV checked, site marked, risks and benefits discussed, surgical/procedural consents, equipment checked, pre-op evaluation, timeout performed, anesthesia consent given, oxygen available and monitors applied/VS acknowledged  Peripheral Block   Patient position: supine  Prep: ChloraPrep  Provider prep: mask  Patient monitoring: cardiac monitor, continuous pulse ox, frequent blood pressure checks, IV access, oxygen and responsive to questions  Block type: Femoral  Adductor canal  Laterality: left  Injection technique: single-shot  Guidance: ultrasound guided and curvilinear probe  Local infiltration: decadron  Infiltration strength: 1 %  Local infiltration: decadron  Dose: 0.5 mL    Needle   Needle type: insulated echogenic nerve stimulator needle   Needle gauge: 20 G  Needle localization: ultrasound guidance  Assessment   Injection assessment: negative aspiration for heme, no paresthesia on injection, local visualized surrounding nerve on ultrasound and no intravascular symptoms  Slow fractionated injection: yes  Hemodynamics: stable  Real-time US image taken/store: yes  Outcomes: uncomplicated    Medications Administered  ropivacaine (NAROPIN) injection 0.2% - Perineural   20 mL - 2/9/2024 10:21:00 AM  dexAMETHasone (DECADRON) (PF) 10 mg/mL injection - Other   5 mg - 2/9/2024 10:21:00 AM

## 2024-02-09 NOTE — PERIOP NOTE
Attempted to apply brief to pt and pt declined brief at this time. Educated pt on importance of brief s/p spinal and pt states understanding but still declines. Chucks pad underneath pt. Plan of care ongoing.

## 2024-02-10 VITALS
HEIGHT: 74 IN | DIASTOLIC BLOOD PRESSURE: 63 MMHG | RESPIRATION RATE: 17 BRPM | OXYGEN SATURATION: 95 % | BODY MASS INDEX: 40.43 KG/M2 | TEMPERATURE: 98.1 F | SYSTOLIC BLOOD PRESSURE: 136 MMHG | HEART RATE: 102 BPM | WEIGHT: 315 LBS

## 2024-02-10 LAB
HCT VFR BLD AUTO: 29.2 % (ref 41.1–50.3)
HGB BLD-MCNC: 10.1 G/DL (ref 13.6–17.2)

## 2024-02-10 PROCEDURE — 2580000003 HC RX 258: Performed by: PHYSICIAN ASSISTANT

## 2024-02-10 PROCEDURE — 6360000002 HC RX W HCPCS: Performed by: PHYSICIAN ASSISTANT

## 2024-02-10 PROCEDURE — 6370000000 HC RX 637 (ALT 250 FOR IP): Performed by: ORTHOPAEDIC SURGERY

## 2024-02-10 PROCEDURE — 36415 COLL VENOUS BLD VENIPUNCTURE: CPT

## 2024-02-10 PROCEDURE — 85014 HEMATOCRIT: CPT

## 2024-02-10 PROCEDURE — 97530 THERAPEUTIC ACTIVITIES: CPT

## 2024-02-10 PROCEDURE — 85018 HEMOGLOBIN: CPT

## 2024-02-10 PROCEDURE — 97535 SELF CARE MNGMENT TRAINING: CPT

## 2024-02-10 PROCEDURE — 6370000000 HC RX 637 (ALT 250 FOR IP): Performed by: PHYSICIAN ASSISTANT

## 2024-02-10 RX ADMIN — ACETAMINOPHEN 1000 MG: 500 TABLET, FILM COATED ORAL at 04:05

## 2024-02-10 RX ADMIN — LOSARTAN POTASSIUM 100 MG: 50 TABLET, FILM COATED ORAL at 08:16

## 2024-02-10 RX ADMIN — AMLODIPINE BESYLATE 5 MG: 5 TABLET ORAL at 08:16

## 2024-02-10 RX ADMIN — CEFAZOLIN 3000 MG: 10 INJECTION, POWDER, FOR SOLUTION INTRAVENOUS at 04:05

## 2024-02-10 RX ADMIN — SENNOSIDES AND DOCUSATE SODIUM 1 TABLET: 50; 8.6 TABLET ORAL at 08:16

## 2024-02-10 RX ADMIN — ESCITALOPRAM OXALATE 20 MG: 10 TABLET ORAL at 08:16

## 2024-02-10 RX ADMIN — METFORMIN HYDROCHLORIDE 500 MG: 500 TABLET ORAL at 08:16

## 2024-02-10 RX ADMIN — OXYCODONE HYDROCHLORIDE 10 MG: 5 TABLET ORAL at 11:29

## 2024-02-10 RX ADMIN — OXYCODONE HYDROCHLORIDE 10 MG: 5 TABLET ORAL at 04:05

## 2024-02-10 RX ADMIN — ASPIRIN 81 MG: 81 TABLET, COATED ORAL at 08:16

## 2024-02-10 RX ADMIN — BUPROPION HYDROCHLORIDE 100 MG: 100 TABLET, EXTENDED RELEASE ORAL at 08:16

## 2024-02-10 ASSESSMENT — PAIN SCALES - GENERAL
PAINLEVEL_OUTOF10: 0
PAINLEVEL_OUTOF10: 3
PAINLEVEL_OUTOF10: 6
PAINLEVEL_OUTOF10: 6
PAINLEVEL_OUTOF10: 3

## 2024-02-10 ASSESSMENT — PAIN DESCRIPTION - LOCATION
LOCATION: KNEE

## 2024-02-10 ASSESSMENT — PAIN DESCRIPTION - ORIENTATION
ORIENTATION: LEFT

## 2024-02-10 ASSESSMENT — PAIN DESCRIPTION - DESCRIPTORS
DESCRIPTORS: ACHING
DESCRIPTORS: ACHING;DISCOMFORT

## 2024-02-10 ASSESSMENT — PAIN - FUNCTIONAL ASSESSMENT: PAIN_FUNCTIONAL_ASSESSMENT: PREVENTS OR INTERFERES SOME ACTIVE ACTIVITIES AND ADLS

## 2024-02-10 NOTE — PROGRESS NOTES
OCCUPATIONAL THERAPY Daily Note and AM      (Link to Caseload Tracking: OT Visit Days: 2  OT Orders   Time  OT Charge Capture  Rehab Caseload Tracker  Episode     Gilbert Yo is a 63 y.o. male   PRIMARY DIAGNOSIS: Status post left knee replacement  Primary osteoarthritis of left knee [M17.12]  Procedure(s) (LRB):  lt KNEE TOTAL ARTHROPLASTY ROBOTIC (Left)  1 Day Post-Op  Reason for Referral: Pain in Left Knee (M25.562)  Stiffness of Left Knee, Not elsewhere classified (M25.662)  Outpatient in a bed: Payor: [facility] / [place] [facility] [place] / Product Type: *No Product type* /     ASSESSMENT:     REHAB RECOMMENDATIONS:   Recommendation to date pending progress:  Setting:  No further skilled occupational therapy after discharge from hospital    Equipment:    Rolling Walker     ASSESSMENT:  Mr. Yo is s/p left TKA and presents with decreased independence with functional mobility and activities of daily living as compared to baseline level of function and safety. Patient would benefit from skilled Occupational Therapy to maximize independence and safety with self-care task and functional mobility.   Patient able to complete  lower body dressing, upper body dressing, toileting, and grooming at bathroom with minimal assist .  Mobilized from hospital bed to bathroom then around room to recliner chair using a rolling walker with assist. Patient is hopeful to spend the night to better prepare for safe discharge home. He will have support of parents and son once home.  2/10/24: Mr. Yo was able to perform toileting and grooming with assistance as charted below. He elected to wait and shower once home. Reviewed safety with tasks like bathing and other daily tasks. He performed functional household mobility and transfers with RW and SBA. He plans to have support from various family members once home. Should progress well with continued therapy upon discharge.       [facility] AM-PAC™ “6 Clicks” Daily

## 2024-02-10 NOTE — PROGRESS NOTES
[facility]        February 10, 2024         Post Op day: 1 Day Post-Op Procedure(s) (LRB):  lt KNEE TOTAL ARTHROPLASTY ROBOTIC (Left)      Admit Date: 2024  Admit Diagnosis: Primary osteoarthritis of left knee [M17.12]       Principle Problem: Status post left knee replacement.           Subjective: Doing well, No complaints, No SOB, No Chest Pain, No Nausea or Vomiting     Objective:   Vital Signs are Stable, No Acute Distress, Alert,  Dressing is Dry,  Neurovascular exam is normal.     Assessment / Plan :  Patient Active Problem List   Diagnosis    Essential hypertension, benign    Obesity, morbid (HCC)    Spinal stenosis, lumbar region, with neurogenic claudication    S/P colonoscopy    Cough    Former smoker    Cardiac murmur    History of bradycardia    Hyperlipidemia    Primary osteoarthritis of left knee    Status post left knee replacement    Patient Vitals for the past 8 hrs:   BP Temp Temp src Pulse Resp SpO2   02/10/24 0838 136/63 98.1 °F (36.7 °C) Oral (!) 102 16 95 %   02/10/24 0435 -- -- -- -- 16 --    Temp (24hrs), Av.7 °F (36.5 °C), Min:97.4 °F (36.3 °C), Max:98.4 °F (36.9 °C)    Body mass index is 41.62 kg/m².    Lab Results   Component Value Date/Time    HGB 10.1 02/10/2024 05:51 AM          S/P Procedure(s) (LRB):  lt KNEE TOTAL ARTHROPLASTY ROBOTIC (Left)      Pain controlled   Aquacel in place and dry.  Anticoagulation plan: ASA 81mg BID   Continue PT/OT as ordered  Fall Precautions  DC disp: Home with    F/U:  postop for wound check and suture removal with [facility] Team        Signed By: Yahaira Cali, EMILY - CNP  2/10/2024,  9:31 AM

## 2024-02-10 NOTE — PROGRESS NOTES
02/09/24 2216   Oxygen Therapy/Pulse Ox   O2 Therapy Room air   O2 Device None (Room air)   Pulse 78   SpO2 95 %   Pulse Oximeter Device Mode Continuous   Pulse Oximeter Device Location Right;Finger   $Pulse Oximeter $Spot check (multiple/continuous)     Pt on continuous monitor for HS. Alarm limits set. Pt working on IS.

## 2024-02-10 NOTE — PROGRESS NOTES
Weight Bearing: Weight Bearing As Tolerated                LOWER EXTREMITY GROSS EVALUATION:   RIGHT LE   Within Functional Limits   Abnormal   Comments   Strength [x] []     Range of Motion [x] []        LEFT LE Within Functional Limits   Abnormal   Comments   Strength [] [x]  Decreased but funcitonal   Range of Motion [] [x] AROM LLE (degrees)  L Knee Flexion (0-145): 93  L Knee Extension (0): 0     UPPER EXTREMITY GROSS EVALUATION:     Within Functional Limits   Abnormal   Comments   Strength [x] []    Range of Motion [x] []      SENSATION  Sensation [x]  grossly     COGNITION/  PERCEPTION: Intact Impaired (Comments):   Orientation [x]     Vision [x]     Hearing [x]     Cognition  [x]       MOBILITY: I Mod I S SBA CGA Min Mod Max Total  NT x2 Comments:   Bed Mobility    Rolling [] [] [] [] [] [] [] [] [] [x] []    Supine to Sit [] [] [] [] [] [] [] [] [] [x] []    Scooting [] [] [] [x] [] [] [] [] [] [] []    Sit to Supine [] [] [] [] [] [] [] [] [] [x] []    Transfers    Sit to Stand [] [] [] [x] [] [] [] [] [] [] []    Bed to Chair [] [] [] [x] [] [] [] [] [] [] [] With walker   Stand to Sit [] [] [] [x] [] [] [] [] [] [] []    Stand Pivot [] [] [] [x] [] [] [] [] [] [] []    Toilet [] [] [] [] [] [] [] [] [] [] []     [] [] [] [] [] [] [] [] [] [] []    I=Independent, Mod I=Modified Independent, S=Supervision, SBA=Standby Assistance, CGA=Contact Guard Assistance,   Min=Minimal Assistance, Mod=Moderate Assistance, Max=Maximal Assistance, Total=Total Assistance, NT=Not Tested    BALANCE: Good Fair+ Fair Fair- Poor NT Comments   Sitting Static [] [] [x] [] [] []    Sitting Dynamic [] [] [x] [] [] []              Standing Static [] [] [x] [x] [] [] With walker   Standing Dynamic [] [] [x] [x] [] [] With walker     PLAN:   FREQUENCY AND DURATION: BID for duration of hospital stay or until stated goals are met, whichever comes first.    THERAPY PROGNOSIS: Good    PROBLEM LIST:   (Skilled intervention is medically  necessary to address:)  Decreased ADL/Functional Activities, Decreased Activity Tolerance, Decreased AROM/PROM, Decreased Gait Ability, Decreased Strength, Decreased Transfer Abilities, and Increased Pain   INTERVENTIONS PLANNED:   (Benefits and precautions of physical therapy have been discussed with the patient.)  Therapeutic Activity, Therapeutic Exercise/HEP, Gait Training, Modalities, and Education       TREATMENT:       TREATMENT:   Therapeutic Activity (39 Minutes): Therapeutic activity included TKA exercises, reviewed POC & PT expectations for DC, reviewed diagonal wt shift to reduce the risk of a blood clot & for prep to wean off walker, reviewed the use of cryotherapy for pain & edema control, progressive gait training 220 ft & repeated stair training, reviewed the expected progression from HHPT to out pt PT along with expected safety measures for the home environment to improve functional Activity tolerance, Balance, Coordination, Mobility, Strength, and ROM.    TREATMENT GRID:  THERAPEUTIC  EXERCISES: DATE:  2/9 DATE:  2/10 DATE:      AM PM AM PM AM PM    [] [] [] [] [] []   Ankle Pumps  20 20      Quad Sets  20 20      Gluteal Sets  20 20      Hip Abd/ADduction  20 20      Straight Leg Raises  20 20      Knee Slides  20 20      Short Arc Quads  20 20      Chair Slides  20 20                        B = bilateral; AA = active assistive; A = active; P = passive      EDUCATION:  [x] Home Exercises  [x] Fall Precautions  [x] No Pillow Under Knee  [x] D/C Instruction Review [x] Cryocuff  [x] Walker Management/Safety  [] Adaptive Equipment as Needed     Interdisciplinary Patient Education   (Reference education tab)    AFTER TREATMENT PRECAUTIONS: Bed/Chair Locked, Call light within reach, Chair, Needs within reach, and RN notified    INTERDISCIPLINARY COLLABORATION:  RN/ PCT    COMPLIANCE WITH PROGRAM/EXERCISE: Will assess as treatment progresses.    RECOMMENDATIONS/INTENT FOR NEXT TREATMENT SESSION:

## 2024-02-10 NOTE — FLOWSHEET NOTE
02/10/24 1208   AVS Reviewed   AVS & discharge instructions reviewed with patient and/or representative? Yes   Reviewed instructions with Patient   Level of Understanding Questions answered

## 2024-02-11 ENCOUNTER — HOME CARE VISIT (OUTPATIENT)
Dept: SCHEDULING | Facility: HOME HEALTH | Age: 64
End: 2024-02-11

## 2024-02-11 VITALS
OXYGEN SATURATION: 100 % | DIASTOLIC BLOOD PRESSURE: 70 MMHG | HEART RATE: 96 BPM | SYSTOLIC BLOOD PRESSURE: 136 MMHG | RESPIRATION RATE: 17 BRPM | TEMPERATURE: 97.2 F

## 2024-02-11 PROCEDURE — G0151 HHCP-SERV OF PT,EA 15 MIN: HCPCS

## 2024-02-11 PROCEDURE — 0221000100 HH NO PAY CLAIM PROCEDURE

## 2024-02-12 DIAGNOSIS — Z96.652 S/P TOTAL KNEE ARTHROPLASTY, LEFT: Primary | ICD-10-CM

## 2024-02-13 ENCOUNTER — HOME CARE VISIT (OUTPATIENT)
Dept: SCHEDULING | Facility: HOME HEALTH | Age: 64
End: 2024-02-13

## 2024-02-13 PROCEDURE — G0157 HHC PT ASSISTANT EA 15: HCPCS

## 2024-02-14 VITALS
HEART RATE: 86 BPM | DIASTOLIC BLOOD PRESSURE: 78 MMHG | OXYGEN SATURATION: 95 % | TEMPERATURE: 98.2 F | RESPIRATION RATE: 18 BRPM | SYSTOLIC BLOOD PRESSURE: 120 MMHG

## 2024-02-16 ENCOUNTER — HOME CARE VISIT (OUTPATIENT)
Dept: SCHEDULING | Facility: HOME HEALTH | Age: 64
End: 2024-02-16

## 2024-02-16 DIAGNOSIS — Z96.659 STATUS POST TOTAL KNEE REPLACEMENT, UNSPECIFIED LATERALITY: Primary | ICD-10-CM

## 2024-02-16 PROCEDURE — G0157 HHC PT ASSISTANT EA 15: HCPCS

## 2024-02-16 RX ORDER — OXYCODONE HYDROCHLORIDE 5 MG/1
5 TABLET ORAL EVERY 4 HOURS PRN
Qty: 30 TABLET | Refills: 0 | Status: SHIPPED | OUTPATIENT
Start: 2024-02-16 | End: 2024-02-21

## 2024-02-17 VITALS
HEART RATE: 66 BPM | DIASTOLIC BLOOD PRESSURE: 72 MMHG | TEMPERATURE: 98.3 F | RESPIRATION RATE: 18 BRPM | OXYGEN SATURATION: 96 % | SYSTOLIC BLOOD PRESSURE: 122 MMHG

## 2024-02-20 ENCOUNTER — HOME CARE VISIT (OUTPATIENT)
Dept: SCHEDULING | Facility: HOME HEALTH | Age: 64
End: 2024-02-20
Payer: MEDICARE

## 2024-02-20 VITALS
TEMPERATURE: 97.9 F | DIASTOLIC BLOOD PRESSURE: 78 MMHG | RESPIRATION RATE: 18 BRPM | SYSTOLIC BLOOD PRESSURE: 128 MMHG | HEART RATE: 74 BPM | OXYGEN SATURATION: 96 %

## 2024-02-20 PROCEDURE — G0157 HHC PT ASSISTANT EA 15: HCPCS

## 2024-02-20 ASSESSMENT — ENCOUNTER SYMPTOMS: PAIN LOCATION - PAIN QUALITY: ACHE

## 2024-02-23 ENCOUNTER — HOME CARE VISIT (OUTPATIENT)
Dept: SCHEDULING | Facility: HOME HEALTH | Age: 64
End: 2024-02-23
Payer: MEDICARE

## 2024-02-23 VITALS
HEART RATE: 68 BPM | RESPIRATION RATE: 16 BRPM | DIASTOLIC BLOOD PRESSURE: 64 MMHG | SYSTOLIC BLOOD PRESSURE: 122 MMHG | TEMPERATURE: 97.2 F | OXYGEN SATURATION: 98 %

## 2024-02-23 PROCEDURE — G0157 HHC PT ASSISTANT EA 15: HCPCS

## 2024-02-25 ENCOUNTER — APPOINTMENT (OUTPATIENT)
Dept: CT IMAGING | Age: 64
End: 2024-02-25
Payer: COMMERCIAL

## 2024-02-25 ENCOUNTER — APPOINTMENT (OUTPATIENT)
Dept: GENERAL RADIOLOGY | Age: 64
End: 2024-02-25
Payer: COMMERCIAL

## 2024-02-25 ENCOUNTER — HOSPITAL ENCOUNTER (EMERGENCY)
Dept: ULTRASOUND IMAGING | Age: 64
Discharge: HOME | End: 2024-02-28
Payer: COMMERCIAL

## 2024-02-25 ENCOUNTER — HOSPITAL ENCOUNTER (EMERGENCY)
Age: 64
Discharge: HOME | End: 2024-02-25
Attending: EMERGENCY MEDICINE
Payer: COMMERCIAL

## 2024-02-25 VITALS
SYSTOLIC BLOOD PRESSURE: 154 MMHG | OXYGEN SATURATION: 96 % | WEIGHT: 315 LBS | HEIGHT: 74 IN | RESPIRATION RATE: 14 BRPM | DIASTOLIC BLOOD PRESSURE: 75 MMHG | HEART RATE: 76 BPM | TEMPERATURE: 98.1 F | BODY MASS INDEX: 40.43 KG/M2

## 2024-02-25 DIAGNOSIS — R79.89 ELEVATED TROPONIN LEVEL: ICD-10-CM

## 2024-02-25 DIAGNOSIS — M71.22 SYNOVIAL CYST OF LEFT KNEE: ICD-10-CM

## 2024-02-25 DIAGNOSIS — R55 SYNCOPE AND COLLAPSE: Primary | ICD-10-CM

## 2024-02-25 LAB
ALBUMIN SERPL-MCNC: 3.5 G/DL (ref 3.2–4.6)
ALBUMIN/GLOB SERPL: 0.9 (ref 0.4–1.6)
ALP SERPL-CCNC: 137 U/L (ref 50–136)
ALT SERPL-CCNC: 20 U/L (ref 12–65)
ANION GAP SERPL CALC-SCNC: 6 MMOL/L (ref 2–11)
AST SERPL-CCNC: 14 U/L (ref 15–37)
BASOPHILS # BLD: 0 K/UL (ref 0–0.2)
BASOPHILS NFR BLD: 0 % (ref 0–2)
BILIRUB SERPL-MCNC: 1.1 MG/DL (ref 0.2–1.1)
BUN SERPL-MCNC: 25 MG/DL (ref 8–23)
CALCIUM SERPL-MCNC: 9.6 MG/DL (ref 8.3–10.4)
CHLORIDE SERPL-SCNC: 107 MMOL/L (ref 103–113)
CO2 SERPL-SCNC: 26 MMOL/L (ref 21–32)
CREAT SERPL-MCNC: 1.8 MG/DL (ref 0.8–1.5)
D DIMER PPP FEU-MCNC: 3.82 UG/ML(FEU)
DIFFERENTIAL METHOD BLD: ABNORMAL
EKG ATRIAL RATE: 69 BPM
EKG DIAGNOSIS: NORMAL
EKG P AXIS: 28 DEGREES
EKG P-R INTERVAL: 149 MS
EKG Q-T INTERVAL: 441 MS
EKG QRS DURATION: 146 MS
EKG QTC CALCULATION (BAZETT): 469 MS
EKG R AXIS: 39 DEGREES
EKG T AXIS: 20 DEGREES
EKG VENTRICULAR RATE: 68 BPM
EOSINOPHIL # BLD: 0.1 K/UL (ref 0–0.8)
EOSINOPHIL NFR BLD: 2 % (ref 0.5–7.8)
ERYTHROCYTE [DISTWIDTH] IN BLOOD BY AUTOMATED COUNT: 13.2 % (ref 11.9–14.6)
GLOBULIN SER CALC-MCNC: 4.1 G/DL (ref 2.8–4.5)
GLUCOSE SERPL-MCNC: 106 MG/DL (ref 65–100)
HCT VFR BLD AUTO: 33.9 % (ref 41.1–50.3)
HGB BLD-MCNC: 11.2 G/DL (ref 13.6–17.2)
IMM GRANULOCYTES # BLD AUTO: 0 K/UL (ref 0–0.5)
IMM GRANULOCYTES NFR BLD AUTO: 1 % (ref 0–5)
LYMPHOCYTES # BLD: 1.2 K/UL (ref 0.5–4.6)
LYMPHOCYTES NFR BLD: 14 % (ref 13–44)
MCH RBC QN AUTO: 30.9 PG (ref 26.1–32.9)
MCHC RBC AUTO-ENTMCNC: 33 G/DL (ref 31.4–35)
MCV RBC AUTO: 93.6 FL (ref 82–102)
MONOCYTES # BLD: 0.8 K/UL (ref 0.1–1.3)
MONOCYTES NFR BLD: 9 % (ref 4–12)
NEUTS SEG # BLD: 6.5 K/UL (ref 1.7–8.2)
NEUTS SEG NFR BLD: 74 % (ref 43–78)
NRBC # BLD: 0 K/UL (ref 0–0.2)
PLATELET # BLD AUTO: 317 K/UL (ref 150–450)
PMV BLD AUTO: 10.4 FL (ref 9.4–12.3)
POTASSIUM SERPL-SCNC: 3.9 MMOL/L (ref 3.5–5.1)
PROT SERPL-MCNC: 7.6 G/DL (ref 6.3–8.2)
RBC # BLD AUTO: 3.62 M/UL (ref 4.23–5.6)
SODIUM SERPL-SCNC: 139 MMOL/L (ref 136–146)
TROPONIN I SERPL HS-MCNC: 32.5 PG/ML (ref 0–14)
TROPONIN I SERPL HS-MCNC: 34 PG/ML (ref 0–14)
WBC # BLD AUTO: 8.7 K/UL (ref 4.3–11.1)

## 2024-02-25 PROCEDURE — 94761 N-INVAS EAR/PLS OXIMETRY MLT: CPT

## 2024-02-25 PROCEDURE — 94762 N-INVAS EAR/PLS OXIMTRY CONT: CPT

## 2024-02-25 PROCEDURE — 93971 EXTREMITY STUDY: CPT

## 2024-02-25 PROCEDURE — 80053 COMPREHEN METABOLIC PANEL: CPT

## 2024-02-25 PROCEDURE — 6360000004 HC RX CONTRAST MEDICATION: Performed by: EMERGENCY MEDICINE

## 2024-02-25 PROCEDURE — 85379 FIBRIN DEGRADATION QUANT: CPT

## 2024-02-25 PROCEDURE — 71045 X-RAY EXAM CHEST 1 VIEW: CPT

## 2024-02-25 PROCEDURE — 99285 EMERGENCY DEPT VISIT HI MDM: CPT

## 2024-02-25 PROCEDURE — 84484 ASSAY OF TROPONIN QUANT: CPT

## 2024-02-25 PROCEDURE — 71260 CT THORAX DX C+: CPT

## 2024-02-25 PROCEDURE — 85025 COMPLETE CBC W/AUTO DIFF WBC: CPT

## 2024-02-25 RX ADMIN — IOPAMIDOL 100 ML: 755 INJECTION, SOLUTION INTRAVENOUS at 17:00

## 2024-02-25 ASSESSMENT — LIFESTYLE VARIABLES
HOW OFTEN DO YOU HAVE A DRINK CONTAINING ALCOHOL: NEVER
HOW MANY STANDARD DRINKS CONTAINING ALCOHOL DO YOU HAVE ON A TYPICAL DAY: PATIENT DOES NOT DRINK

## 2024-02-25 ASSESSMENT — PAIN - FUNCTIONAL ASSESSMENT: PAIN_FUNCTIONAL_ASSESSMENT: NONE - DENIES PAIN

## 2024-02-25 NOTE — ED PROVIDER NOTES
chest demonstrates  normal heart size.  The lungs  are clear. There is a small left pleural effusion. No pneumothorax.      Impression    Small left pleural effusion.     CT CHEST PULMONARY EMBOLISM W CONTRAST    Narrative    EXAMINATION: CT CHEST PULMONARY EMBOLISM W CONTRAST 2/25/2024 5:13 PM    ACCESSION NUMBER: [id number]    COMPARISON: None available    INDICATION: PE protocol. sycope, pale diaphoretic per EMS. recent Left knee  surg. dimer 3.4    TECHNIQUE: Multiple contiguous 2D axial CT images of the chest were obtained  from the lung apices to the lung bases after the intravenous administration of  100mL Iso-nikko 370 per pulmonary angiography protocol.  Coronal reconstructions  were performed.    Radiation dose reduction techniques were used for this study. Our CT scanners  use one or all of the following: Automated exposure control, adjustment of the  mA and/or kV according to patient size, iterative reconstruction.    FINDINGS:  STUDY QUALITY: The exam study quality is good.    AIRWAYS: The central airways are patent.    LUNGS: No acute consolidation or airspace opacities. No suspicious pulmonary  nodules. Mild subpleural banding in the bilateral lower lobes. Subsegmental  atelectasis/scarring in the lingula.    PLEURA: No pleural effusion or pneumothorax.     HEART: The heart is not enlarged. No calcified coronary atherosclerosis.  No  pericardial effusion.     THORACIC AORTA: The aorta is normal in caliber.     PULMONARY ARTERY: No pulmonary embolus to the segmental level. The main  pulmonary artery is normal in caliber.    MEDIASTINUM/ILANA: No mediastinal mass or lymphadenopathy.    CHEST WALL: No mass or axillary lymphadenopathy.     UPPER ABDOMEN: The visualized upper abdomen is unremarkable.     BONES: No suspicious osseous lesion. Moderate multilevel degenerative changes in  the visualized spine.        Impression    1.  No pulmonary embolus.  2.  No acute findings within the chest.  3.  Mild

## 2024-02-25 NOTE — ED TRIAGE NOTES
Patient a 64 y/o Male reports to the ED via EMS with c/c of dizziness. States he was eating lunch when he became pale and diaphoretic. BGL with EMS was 141. BP 80/60. Hx of left knee replacement on 02/09/2024. Got 500NS with EMS

## 2024-02-26 NOTE — ED NOTES
I have reviewed discharge instructions with the patient and parent.  The patient and parent verbalized understanding.    Patient left ED via Discharge Method: ambulatory to Home with parent.    Opportunity for questions and clarification provided.       Patient given 0 scripts.         To continue your aftercare when you leave the hospital, you may receive an automated call from our care team to check in on how you are doing.  This is a free service and part of our promise to provide the best care and service to meet your aftercare needs.” If you have questions, or wish to unsubscribe from this service please call [phone].  Thank you for Choosing our [facility] Emergency Department.       Irena Del Cid, RN  02/25/24 2017

## 2024-02-27 ENCOUNTER — HOME CARE VISIT (OUTPATIENT)
Dept: SCHEDULING | Facility: HOME HEALTH | Age: 64
End: 2024-02-27
Payer: MEDICARE

## 2024-02-27 VITALS
RESPIRATION RATE: 18 BRPM | DIASTOLIC BLOOD PRESSURE: 62 MMHG | OXYGEN SATURATION: 98 % | SYSTOLIC BLOOD PRESSURE: 114 MMHG | HEART RATE: 62 BPM | TEMPERATURE: 97.9 F

## 2024-02-27 PROCEDURE — G0157 HHC PT ASSISTANT EA 15: HCPCS

## 2024-02-27 ASSESSMENT — ENCOUNTER SYMPTOMS: PAIN LOCATION - PAIN QUALITY: SORE

## 2024-03-01 ENCOUNTER — HOME CARE VISIT (OUTPATIENT)
Dept: SCHEDULING | Facility: HOME HEALTH | Age: 64
End: 2024-03-01
Payer: MEDICARE

## 2024-03-01 VITALS
OXYGEN SATURATION: 98 % | RESPIRATION RATE: 17 BRPM | DIASTOLIC BLOOD PRESSURE: 68 MMHG | SYSTOLIC BLOOD PRESSURE: 120 MMHG | TEMPERATURE: 97.6 F | HEART RATE: 72 BPM

## 2024-03-01 PROCEDURE — G0151 HHCP-SERV OF PT,EA 15 MIN: HCPCS

## 2024-03-01 ASSESSMENT — ENCOUNTER SYMPTOMS
PAIN LOCATION - PAIN QUALITY: ACHING
DYSPNEA ACTIVITY LEVEL: AFTER AMBULATING MORE THAN 20 FT

## 2024-03-04 ENCOUNTER — TELEPHONE (OUTPATIENT)
Dept: ORTHOPEDIC SURGERY | Age: 64
End: 2024-03-04

## 2024-03-04 ENCOUNTER — OFFICE VISIT (OUTPATIENT)
Dept: ORTHOPEDIC SURGERY | Age: 64
End: 2024-03-04
Payer: COMMERCIAL

## 2024-03-04 DIAGNOSIS — Z96.659 STATUS POST TOTAL KNEE REPLACEMENT, UNSPECIFIED LATERALITY: Primary | ICD-10-CM

## 2024-03-04 DIAGNOSIS — Z96.652 STATUS POST LEFT KNEE REPLACEMENT: ICD-10-CM

## 2024-03-04 LAB
BASOPHILS # BLD: 0 K/UL (ref 0–0.2)
BASOPHILS NFR BLD: 0 % (ref 0–2)
CRP SERPL-MCNC: 4.1 MG/DL (ref 0–0.9)
DIFFERENTIAL METHOD BLD: ABNORMAL
EOSINOPHIL # BLD: 0.2 K/UL (ref 0–0.8)
EOSINOPHIL NFR BLD: 1 % (ref 0.5–7.8)
ERYTHROCYTE [DISTWIDTH] IN BLOOD BY AUTOMATED COUNT: 13.2 % (ref 11.9–14.6)
ERYTHROCYTE [SEDIMENTATION RATE] IN BLOOD: 32 MM/HR
HCT VFR BLD AUTO: 35.8 % (ref 41.1–50.3)
HGB BLD-MCNC: 11.8 G/DL (ref 13.6–17.2)
IMM GRANULOCYTES # BLD AUTO: 0.3 K/UL (ref 0–0.5)
IMM GRANULOCYTES NFR BLD AUTO: 2 % (ref 0–5)
LYMPHOCYTES # BLD: 1.7 K/UL (ref 0.5–4.6)
LYMPHOCYTES NFR BLD: 12 % (ref 13–44)
MCH RBC QN AUTO: 30.6 PG (ref 26.1–32.9)
MCHC RBC AUTO-ENTMCNC: 33 G/DL (ref 31.4–35)
MCV RBC AUTO: 92.7 FL (ref 82–102)
MONOCYTES # BLD: 1.4 K/UL (ref 0.1–1.3)
MONOCYTES NFR BLD: 9 % (ref 4–12)
NEUTS SEG # BLD: 11.4 K/UL (ref 1.7–8.2)
NEUTS SEG NFR BLD: 76 % (ref 43–78)
NRBC # BLD: 0 K/UL (ref 0–0.2)
PLATELET # BLD AUTO: 302 K/UL (ref 150–450)
PMV BLD AUTO: 11.9 FL (ref 9.4–12.3)
RBC # BLD AUTO: 3.86 M/UL (ref 4.23–5.6)
WBC # BLD AUTO: 15 K/UL (ref 4.3–11.1)

## 2024-03-04 PROCEDURE — 99024 POSTOP FOLLOW-UP VISIT: CPT | Performed by: PHYSICIAN ASSISTANT

## 2024-03-04 PROCEDURE — L1830 KO IMMOB CANVAS LONG PRE OTS: HCPCS | Performed by: PHYSICIAN ASSISTANT

## 2024-03-04 NOTE — PROGRESS NOTES
20 inch knee immobilizer for patients left knee. I instructed patient that the strips of support velco should align on the medial and lateral sides of the leg. Patient was informed to tigthen the bottom strap first to anchor the brace, followed by the strap above the knee and below the knee. Finally tighthening the very top strapPatient read and signed documenting they understand and agree to [facility]'s current DME return policy.

## 2024-03-04 NOTE — TELEPHONE ENCOUNTER
Confirmed pt is having a red colored drainage coming from incision is unable to upload photo in [facility] will come to [facility] to see ewa to check incision   
He is going to PT today at 1:30 but he is having some drainage from his incision. He would like a call to discuss this and if he should go to PT.   
[employer]

## 2024-03-04 NOTE — PROGRESS NOTES
Post-op TKA Visit      03/04/24     No Known Allergies  Current Outpatient Medications on File Prior to Visit   Medication Sig Dispense Refill    aspirin (ASPIRIN 81) 81 MG EC tablet Take 1 tablet by mouth in the morning and at bedtime 70 tablet 0    celecoxib (CELEBREX) 200 MG capsule Take 1 capsule by mouth 2 times daily 60 capsule 0    promethazine (PHENERGAN) 12.5 MG tablet Take 1 tablet by mouth 4 times daily as needed for Nausea 20 tablet 2    methocarbamol (ROBAXIN-750) 750 MG tablet Take 1 tablet by mouth 3 times daily as needed (muscle spasm or cramps) 40 tablet 1    buPROPion (WELLBUTRIN SR) 100 MG extended release tablet Take 150 mg by mouth daily      VRAYLAR 1.5 MG capsule Take 1 capsule by mouth daily      QUVIVIQ 50 MG TABS Take 50 mg by mouth at bedtime      eszopiclone (LUNESTA) 1 MG TABS TAKE UP TO 3 TABLETS BY MOUTH AT NIGHT FOR SLEEP      gabapentin (NEURONTIN) 300 MG capsule Take 300 mg by mouth daily.      irbesartan (AVAPRO) 300 MG tablet Take 300 mg by mouth daily      metFORMIN (GLUCOPHAGE-XR) 500 MG extended release tablet Take 1 tablet by mouth 2 times daily      traMADol (ULTRAM) 50 MG tablet Take 1 tablet by mouth every 6 hours as needed for Pain.      busPIRone (BUSPAR) 5 MG tablet Take 1 tablet by mouth nightly as needed (sleep aid)      ALPRAZolam (XANAX) 1 MG tablet TAKE 1 TABLET BY MOUTH 3 TIMES A DAY      amLODIPine (NORVASC) 5 MG tablet Take 1 tablet by mouth daily      atorvastatin (LIPITOR) 20 MG tablet Take 1 tablet by mouth daily      escitalopram (LEXAPRO) 20 MG tablet Take 1 tablet by mouth daily       No current facility-administered medications on file prior to visit.        4 weeks Status Post left TKA     History: The patient returns today for post-op visit following left TKA.   Their pain is improving. They are ambulating without any walking aid. They have completed home physical therapy. This morning he started having yellow and pus drainage from 2 places on the knee

## 2024-03-05 ENCOUNTER — OFFICE VISIT (OUTPATIENT)
Dept: ORTHOPEDIC SURGERY | Age: 64
End: 2024-03-05
Payer: COMMERCIAL

## 2024-03-05 DIAGNOSIS — Z96.652 STATUS POST LEFT KNEE REPLACEMENT: Primary | ICD-10-CM

## 2024-03-05 PROCEDURE — 99213 OFFICE O/P EST LOW 20 MIN: CPT | Performed by: ORTHOPAEDIC SURGERY

## 2024-03-05 RX ORDER — SULFAMETHOXAZOLE AND TRIMETHOPRIM 800; 160 MG/1; MG/1
1 TABLET ORAL 2 TIMES DAILY
Qty: 20 TABLET | Refills: 0 | Status: ON HOLD | OUTPATIENT
Start: 2024-03-05 | End: 2024-03-15

## 2024-03-05 NOTE — PROGRESS NOTES
Post-op TKA Visit      03/05/24     No Known Allergies  Current Outpatient Medications on File Prior to Visit   Medication Sig Dispense Refill    aspirin (ASPIRIN 81) 81 MG EC tablet Take 1 tablet by mouth in the morning and at bedtime 70 tablet 0    celecoxib (CELEBREX) 200 MG capsule Take 1 capsule by mouth 2 times daily 60 capsule 0    promethazine (PHENERGAN) 12.5 MG tablet Take 1 tablet by mouth 4 times daily as needed for Nausea 20 tablet 2    methocarbamol (ROBAXIN-750) 750 MG tablet Take 1 tablet by mouth 3 times daily as needed (muscle spasm or cramps) 40 tablet 1    buPROPion (WELLBUTRIN SR) 100 MG extended release tablet Take 150 mg by mouth daily      VRAYLAR 1.5 MG capsule Take 1 capsule by mouth daily      QUVIVIQ 50 MG TABS Take 50 mg by mouth at bedtime      eszopiclone (LUNESTA) 1 MG TABS TAKE UP TO 3 TABLETS BY MOUTH AT NIGHT FOR SLEEP      gabapentin (NEURONTIN) 300 MG capsule Take 300 mg by mouth daily.      irbesartan (AVAPRO) 300 MG tablet Take 300 mg by mouth daily      metFORMIN (GLUCOPHAGE-XR) 500 MG extended release tablet Take 1 tablet by mouth 2 times daily      traMADol (ULTRAM) 50 MG tablet Take 1 tablet by mouth every 6 hours as needed for Pain.      busPIRone (BUSPAR) 5 MG tablet Take 1 tablet by mouth nightly as needed (sleep aid)      ALPRAZolam (XANAX) 1 MG tablet TAKE 1 TABLET BY MOUTH 3 TIMES A DAY      amLODIPine (NORVASC) 5 MG tablet Take 1 tablet by mouth daily      atorvastatin (LIPITOR) 20 MG tablet Take 1 tablet by mouth daily      escitalopram (LEXAPRO) 20 MG tablet Take 1 tablet by mouth daily       No current facility-administered medications on file prior to visit.        4 weeks Status Post left TKA     History: The patient returns today for post-op visit following left TKA.   Their pain is improving.  They are ambulating without any walking aid. They have completed home physical therapy. This morning he started having drainage from 2 areas on his suture line.  He

## 2024-03-06 DIAGNOSIS — Z96.652 STATUS POST LEFT KNEE REPLACEMENT: Primary | ICD-10-CM

## 2024-03-06 DIAGNOSIS — Z96.652 HX OF TOTAL KNEE ARTHROPLASTY, LEFT: ICD-10-CM

## 2024-03-06 DIAGNOSIS — T84.54XA INFECTION OF TOTAL LEFT KNEE REPLACEMENT, INITIAL ENCOUNTER (HCC): ICD-10-CM

## 2024-03-07 ENCOUNTER — OFFICE VISIT (OUTPATIENT)
Dept: ORTHOPEDIC SURGERY | Age: 64
End: 2024-03-07

## 2024-03-07 ENCOUNTER — HOSPITAL ENCOUNTER (OUTPATIENT)
Dept: SURGERY | Age: 64
Discharge: HOME | DRG: 470 | End: 2024-03-07
Payer: COMMERCIAL

## 2024-03-07 ENCOUNTER — ANESTHESIA EVENT (OUTPATIENT)
Dept: SURGERY | Age: 64
End: 2024-03-07
Payer: MEDICARE

## 2024-03-07 VITALS
SYSTOLIC BLOOD PRESSURE: 113 MMHG | HEART RATE: 84 BPM | RESPIRATION RATE: 16 BRPM | OXYGEN SATURATION: 95 % | BODY MASS INDEX: 39.36 KG/M2 | HEIGHT: 74 IN | DIASTOLIC BLOOD PRESSURE: 63 MMHG | TEMPERATURE: 98.2 F | WEIGHT: 306.66 LBS

## 2024-03-07 DIAGNOSIS — Z96.652 STATUS POST LEFT KNEE REPLACEMENT: Primary | ICD-10-CM

## 2024-03-07 LAB
ANION GAP SERPL CALC-SCNC: 7 MMOL/L (ref 2–11)
APTT PPP: 29.9 SEC (ref 23.3–37.4)
BASOPHILS # BLD: 0 K/UL (ref 0–0.2)
BASOPHILS NFR BLD: 0 % (ref 0–2)
BUN SERPL-MCNC: 38 MG/DL (ref 8–23)
CALCIUM SERPL-MCNC: 9.3 MG/DL (ref 8.3–10.4)
CHLORIDE SERPL-SCNC: 106 MMOL/L (ref 103–113)
CO2 SERPL-SCNC: 24 MMOL/L (ref 21–32)
CREAT SERPL-MCNC: 2.25 MG/DL (ref 0.8–1.5)
DIFFERENTIAL METHOD BLD: ABNORMAL
EOSINOPHIL # BLD: 0.3 K/UL (ref 0–0.8)
EOSINOPHIL NFR BLD: 3 % (ref 0.5–7.8)
ERYTHROCYTE [DISTWIDTH] IN BLOOD BY AUTOMATED COUNT: 12.8 % (ref 11.9–14.6)
EST. AVERAGE GLUCOSE BLD GHB EST-MCNC: 97 MG/DL
GLUCOSE SERPL-MCNC: 108 MG/DL (ref 65–100)
HBA1C MFR BLD: 5 % (ref 4.8–5.6)
HCT VFR BLD AUTO: 33.1 % (ref 41.1–50.3)
HGB BLD-MCNC: 11 G/DL (ref 13.6–17.2)
IMM GRANULOCYTES # BLD AUTO: 0 K/UL (ref 0–0.5)
IMM GRANULOCYTES NFR BLD AUTO: 0 % (ref 0–5)
INR PPP: 1.1
LYMPHOCYTES # BLD: 1.5 K/UL (ref 0.5–4.6)
LYMPHOCYTES NFR BLD: 15 % (ref 13–44)
MCH RBC QN AUTO: 30.8 PG (ref 26.1–32.9)
MCHC RBC AUTO-ENTMCNC: 33.2 G/DL (ref 31.4–35)
MCV RBC AUTO: 92.7 FL (ref 82–102)
MONOCYTES # BLD: 0.8 K/UL (ref 0.1–1.3)
MONOCYTES NFR BLD: 8 % (ref 4–12)
NEUTS SEG # BLD: 7.2 K/UL (ref 1.7–8.2)
NEUTS SEG NFR BLD: 73 % (ref 43–78)
NRBC # BLD: 0 K/UL (ref 0–0.2)
PLATELET # BLD AUTO: 274 K/UL (ref 150–450)
PMV BLD AUTO: 11.2 FL (ref 9.4–12.3)
POTASSIUM SERPL-SCNC: 4.5 MMOL/L (ref 3.5–5.1)
PROTHROMBIN TIME: 14 SEC (ref 11.3–14.9)
RBC # BLD AUTO: 3.57 M/UL (ref 4.23–5.6)
SODIUM SERPL-SCNC: 137 MMOL/L (ref 136–146)
WBC # BLD AUTO: 9.9 K/UL (ref 4.3–11.1)

## 2024-03-07 PROCEDURE — 85610 PROTHROMBIN TIME: CPT

## 2024-03-07 PROCEDURE — 80048 BASIC METABOLIC PNL TOTAL CA: CPT

## 2024-03-07 PROCEDURE — 83036 HEMOGLOBIN GLYCOSYLATED A1C: CPT

## 2024-03-07 PROCEDURE — 85025 COMPLETE CBC W/AUTO DIFF WBC: CPT

## 2024-03-07 PROCEDURE — 85730 THROMBOPLASTIN TIME PARTIAL: CPT

## 2024-03-07 PROCEDURE — 87641 MR-STAPH DNA AMP PROBE: CPT

## 2024-03-07 ASSESSMENT — PAIN DESCRIPTION - LOCATION: LOCATION: KNEE

## 2024-03-07 ASSESSMENT — PAIN DESCRIPTION - ORIENTATION: ORIENTATION: LEFT

## 2024-03-07 ASSESSMENT — PAIN SCALES - GENERAL: PAINLEVEL_OUTOF10: 2

## 2024-03-07 NOTE — PROGRESS NOTES
Patient verified name and .    Order for consent  found in EHR and matches case posting; patient verified.     Type 3 surgery, joint assessment complete.    Labs per surgeon: CBC,BMP, PT/PTT, Hgb A1c ; results pending  Labs per anesthesia protocol: no additional  EKG:completed 24 and within anesthesia guidelines; ECHO 23, cardiology office note per Dr. Ray~available in Chart Review for anesthesia reference.    MRSA/MSSA swab collected; pharmacy to review and dose antibiotic as appropriate.     Hospital approved surgical skin cleanser and instructions to return bottle on DOS given per hospital policy.    Patient provided with handouts including Guide to Surgery, Pain Management, Hand Hygiene, Blood Transfusion Education, and [place] [facility] Brochure.    Patient answered medical/surgical history questions at their best of ability. All prior to admission medications documented in [facility]. Original medication prescription bottle not visualized during patient appointment.     Patient instructed to hold all vitamins 3 weeks prior to surgery and NSAIDS 5 days prior to surgery.     Patient teach back successful and patient demonstrates knowledge of instruction.

## 2024-03-07 NOTE — PROGRESS NOTES
[facility]  Pre Operative History and Physical Exam    Patient ID:  Gilbert Yo  [id number]  63 y.o.  1960    Today: March 7, 2024           CC: Left knee pain and drainage s/p L TKA    HPI:   The patient has a draining wound of left knee.  4 weeks status post left total knee arthroplasty.  C-reactive protein is 4.2 sed rate 32.  Has no increasing pain in the left knee and has good range of motion.  His wound failed to stop draining with knee immobilization and wound VAC.  The patient is admitted today for irrigation debridement of right knee wound and possible 1 stage revision.  Past Medical/Surgical History:  Past Medical History:   Diagnosis Date    Anxiety and depression     managed with medication    Awareness under anesthesia 1980s    with umb hernia surg    Back pain     Bulging lumbar disc     L4-L5    Essential hypertension, benign     controlled with med    H/O colonoscopy 2018    due in 2027    High cholesterol     managed with medication    History of anemia     History of echocardiogram 02/14/2023    Left Ventricle: Normal left ventricular systolic function with a est EF 55- 60%. Left ventricle size is nml. Mildly increased wall thickness. Nml wall motion. Abn diastolic function. Aortic Valve: Mild sclerosis of aortic valve cusp. Mild stenosis of aortic valve. AV mean gradient 9 mmHg.Mild sclerosis of aortic valve cusp.No regurgitation. Mild stenosis of aortic valve. AV mean gradient  9 mmHg    Morbid obesity (HCC) 5/31/16    BMI- 40 (verbal)    Murmur, cardiac     2 out of 6 systolic murmur heard over the right upper sternal border    Other and unspecified hyperlipidemia     Prediabetes     Metformin for prevention    Psychiatric disorder     depression/ anxiety    Syncope and collapse 02/25/2024     Past Surgical History:   Procedure Laterality Date    HERNIA REPAIR Bilateral     ing    HERNIA REPAIR      umbilical    NEUROLOGICAL SURGERY      lami/ L-4, L5 laminectomy- X 2

## 2024-03-07 NOTE — PROGRESS NOTES
Latest Reference Range & Units 03/07/24 13:35   Hemoglobin A1C 4.8 - 5.6 % 5.0   eAG (mg/dL) mg/dL 97   WBC 4.3 - 11.1 K/uL 9.9   RBC 4.23 - 5.6 M/uL 3.57 (L)   Hemoglobin Quant 13.6 - 17.2 g/dL 11.0 (L)   Hematocrit 41.1 - 50.3 % 33.1 (L)   MCV 82.0 - 102.0 FL 92.7   MCH 26.1 - 32.9 PG 30.8   MCHC 31.4 - 35.0 g/dL 33.2   MPV 9.4 - 12.3 FL 11.2   RDW 11.9 - 14.6 % 12.8   Platelet Count 150 - 450 K/uL 274   Neutrophils % 43 - 78 % 73   Lymphocyte % 13 - 44 % 15   Monocytes % 4.0 - 12.0 % 8   Eosinophils % 0.5 - 7.8 % 3   Basophils % 0.0 - 2.0 % 0   Neutrophils Absolute 1.7 - 8.2 K/UL 7.2   Lymphocytes Absolute 0.5 - 4.6 K/UL 1.5   Monocytes Absolute 0.1 - 1.3 K/UL 0.8   Eosinophils Absolute 0.0 - 0.8 K/UL 0.3   Basophils Absolute 0.0 - 0.2 K/UL 0.0   Differential Type -   AUTOMATED   Immature Granulocytes 0.0 - 5.0 % 0   Nucleated Red Blood Cells 0.0 - 0.2 K/uL 0.00   Absolute Immature Granulocyte 0.0 - 0.5 K/UL 0.0   Prothrombin Time 11.3 - 14.9 sec 14.0   INR -   1.1   APTT 23.3 - 37.4 SEC 29.9   (L): Data is abnormally low

## 2024-03-07 NOTE — PROGRESS NOTES
How to Use Your Incentive Spirometer       About Your Incentive Spirometer  An incentive spirometer is a device that will expand your lungs by helping you to breathe more deeply and fully. The parts of your incentive spirometer are labeled in Figure 1.    Using your incentive spirometer  When you're using your incentive spirometer, make sure to breathe through your mouth. If you breathe through your nose, the incentive spirometer won't work properly. You can hold your nose if you have trouble. DO NOT BLOW INTO THE DEVICE. If you feel dizzy at any time, stop and rest. Try again at a later time.  Sit upright in a chair or in bed. Hold the incentive spirometer at eye level.   Put the mouthpiece in your mouth and close your lips tightly around it. Slowly breathe out (exhale) completely.  Breathe in (inhale) slowly through your mouth as deeply as you can. As you take the breath, you will see the piston rise inside the large column. While the piston rises, the indicator on the right should move upwards. It should stay in between the 2 arrows (see Figure 1).  Try to get the piston as high as you can, while keeping the indicator between the arrows. If the indicator doesn't stay between the arrows, you're breathing either too fast or too slow.  When you get it as high as you can, hold your breath for 10 seconds, or as long as possible. While you're holding your breath, the piston will slowly fall to the base of the spirometer.  Once the piston reaches the bottom of the spirometer, breathe out slowly through your mouth. Rest for a few seconds.  Repeat 10 times. Try to get the piston to the same level with each breath.  After each set of 10 breaths, try to cough as coughing will help loosen or clear any mucus in your lungs.  Put the marker at the level the piston reached on your incentive spirometer. This will be your goal next time.  Repeat these steps every hour that you're awake.  Cover the mouthpiece of the incentive  spirometer when you aren't using it

## 2024-03-07 NOTE — H&P (VIEW-ONLY)
[facility]  Pre Operative History and Physical Exam    Patient ID:  Gilbert Yo  [id number]  63 y.o.  1960    Today: March 7, 2024           CC: Left knee pain and drainage s/p L TKA    HPI:   The patient has a draining wound of left knee.  4 weeks status post left total knee arthroplasty.  C-reactive protein is 4.2 sed rate 32.  Has no increasing pain in the left knee and has good range of motion.  His wound failed to stop draining with knee immobilization and wound VAC.  The patient is admitted today for irrigation debridement of right knee wound and possible 1 stage revision.  Past Medical/Surgical History:  Past Medical History:   Diagnosis Date    Anxiety and depression     managed with medication    Awareness under anesthesia 1980s    with umb hernia surg    Back pain     Bulging lumbar disc     L4-L5    Essential hypertension, benign     controlled with med    H/O colonoscopy 2018    due in 2027    High cholesterol     managed with medication    History of anemia     History of echocardiogram 02/14/2023    Left Ventricle: Normal left ventricular systolic function with a est EF 55- 60%. Left ventricle size is nml. Mildly increased wall thickness. Nml wall motion. Abn diastolic function. Aortic Valve: Mild sclerosis of aortic valve cusp. Mild stenosis of aortic valve. AV mean gradient 9 mmHg.Mild sclerosis of aortic valve cusp.No regurgitation. Mild stenosis of aortic valve. AV mean gradient  9 mmHg    Morbid obesity (HCC) 5/31/16    BMI- 40 (verbal)    Murmur, cardiac     2 out of 6 systolic murmur heard over the right upper sternal border    Other and unspecified hyperlipidemia     Prediabetes     Metformin for prevention    Psychiatric disorder     depression/ anxiety    Syncope and collapse 02/25/2024     Past Surgical History:   Procedure Laterality Date    HERNIA REPAIR Bilateral     ing    HERNIA REPAIR      umbilical    NEUROLOGICAL SURGERY      lami/ L-4, L5 laminectomy- X 2

## 2024-03-07 NOTE — DISCHARGE INSTRUCTIONS
[facility]   Patient Discharge Instructions    Gilbert Yo / [id number] : 1960    Admitted (Not on file) Discharged: 3/7/2024     IF YOU HAVE ANY PROBLEMS ONCE YOU ARE AT HOME CALL THE FOLLOWING NUMBERS:   Nurse's line: [phone]  Main office number: [phone]      Medications    The medications you are to continue on are listed on the medication reconciliation sheet.   Narcotic pain medications as well as supplemental iron can cause constipation. If this occurs, try over the counter stool softeners or try stopping the narcotic pain medication and/or the iron.   It is important that you take the medication exactly as they are prescribed.  Medications which increase your risk of blood clots are listed to stop for 5 weeks after surgery as well as medications or supplements which increase your risk of bleeding complications.   Keep your medication in the bottles provided by the pharmacist and keep a list of the medication names, dosages, and times to be taken in your wallet.   Do not take other medications without consulting your doctor.  If you need a refill on your pain medication, please note our office is closed over the weekend. It is our office policy that on-call providers cannot refill narcotic pain medications over the weekend. If you will need a refill over the weekend, please call our office before 12pm on Friday or first thing Monday morning.        Important Information    Do NOT smoke as this will greatly increase your risk of infection!    Resume your prehospital diet. If you have excessive nausea or vomitting call your doctor's office     Leg swelling and warmth is normal for 6 months after surgery. If you experience swelling in your leg, elevate your leg while laying down with your toes above your heart. If you have sudden onset severe swelling with leg pain call our office. Use [employer] stockings until we see you in the office for your follow up                                                                                                                        Patient or Representative Signature                                                          Date/Time

## 2024-03-07 NOTE — PROGRESS NOTES
PLEASE CONTINUE TAKING ALL PRESCRIPTION MEDICATIONS UP TO THE DAY OF SURGERY UNLESS OTHERWISE DIRECTED BELOW.    DISCONTINUE all vitamins, herbals and supplements 3 weeks prior to surgery. DISCONTINUE Non-Steroidal Anti-Inflammatory (NSAIDS) such as Advil, Ibuprofen, Motrin, Naproxen and Aleve 5 days prior to surgery.       Home Medications to take  the day of surgery    Aspirin 81                   Wellbutrin       Vraylor   Alprazolam, if needed         Escitalopram      Gabapentin     Atorvastatin       Amlodipine     Home Medications to Hold- please continue all other medications except these.            Comments   On the day before surgery please take Acetaminophen 1000mg in the morning and then again before bed. You may substitute for Tylenol 650 mg.      Bring Dynahex wash and Incentive Spirometer with you to hospital on the day of surgery.     BRING Quviviq in original bottle       Please do not bring home medications with you on the day of surgery unless otherwise directed by your nurse.  If you are instructed to bring home medications, please give them to your nurse as they will be administered by the nursing staff.    If you have any questions, please call [facility] [phone].    A copy of this note was provided to the patient for reference.

## 2024-03-07 NOTE — PROGRESS NOTES
Patient verified name and .    Order for consent  found in EHR and matches case posting; patient verified.     Type 3 surgery, joint assessment complete.    Labs per surgeon: CBC,BMP, PT/PTT, Hgb A1c ; results BMP results are pending, lab called at 1525 and again at 1540. Informed that BMP just resulted and should show on results review in a few minutes; all other results are within anesthesia guidelines.  Labs per anesthesia protocol: no additional  EKG:completed 24 and within anesthesia guidelines; ECHO 23; cardiology office note per Dr. Ray 23 ~all available in Chart Review for anesthesia reference.    MRSA/MSSA swab collected; pharmacy to review and dose antibiotic as appropriate.     Hospital approved surgical skin cleanser and instructions to return bottle on DOS given per hospital policy.    Patient provided with handouts including Guide to Surgery, Pain Management, Hand Hygiene, Blood Transfusion Education, and [place] [facility] Brochure.    Patient answered medical/surgical history questions at their best of ability. All prior to admission medications documented in [facility]. Original medication prescription bottle not visualized during patient appointment.     Patient instructed to hold all vitamins 3 weeks prior to surgery and NSAIDS 5 days prior to surgery.     Patient teach back successful and patient demonstrates knowledge of instruction.

## 2024-03-08 ENCOUNTER — HOSPITAL ENCOUNTER (INPATIENT)
Age: 64
LOS: 4 days | Discharge: HOME | DRG: 470 | End: 2024-03-12
Attending: ORTHOPAEDIC SURGERY | Admitting: ORTHOPAEDIC SURGERY
Payer: COMMERCIAL

## 2024-03-08 ENCOUNTER — APPOINTMENT (OUTPATIENT)
Dept: GENERAL RADIOLOGY | Age: 64
DRG: 470 | End: 2024-03-08
Attending: ORTHOPAEDIC SURGERY
Payer: COMMERCIAL

## 2024-03-08 ENCOUNTER — ANESTHESIA (OUTPATIENT)
Dept: SURGERY | Age: 64
End: 2024-03-08
Payer: MEDICARE

## 2024-03-08 DIAGNOSIS — M17.12 PRIMARY OSTEOARTHRITIS OF LEFT KNEE: ICD-10-CM

## 2024-03-08 DIAGNOSIS — Z96.652 HX OF TOTAL KNEE ARTHROPLASTY, LEFT: Primary | ICD-10-CM

## 2024-03-08 DIAGNOSIS — T84.54XA: ICD-10-CM

## 2024-03-08 DIAGNOSIS — Z96.652 STATUS POST LEFT KNEE REPLACEMENT: ICD-10-CM

## 2024-03-08 DIAGNOSIS — T84.54XD INFECTION OF TOTAL LEFT KNEE REPLACEMENT, SUBSEQUENT ENCOUNTER: ICD-10-CM

## 2024-03-08 PROBLEM — S81.002A OPEN WOUND OF LEFT KNEE, INITIAL ENCOUNTER: Status: ACTIVE | Noted: 2024-03-08

## 2024-03-08 LAB
ABO + RH BLD: NORMAL
BLOOD GROUP ANTIBODIES SERPL: NORMAL
GLUCOSE BLD STRIP.AUTO-MCNC: 107 MG/DL (ref 65–100)
HCT VFR BLD AUTO: 28 % (ref 41.1–50.3)
HGB BLD-MCNC: 9.2 G/DL (ref 13.6–17.2)
MRSA DNA SPEC QL NAA+PROBE: NOT DETECTED
S AUREUS CPE NOSE QL NAA+PROBE: NOT DETECTED
SERVICE CMNT-IMP: ABNORMAL
SPECIMEN EXP DATE BLD: NORMAL

## 2024-03-08 PROCEDURE — 85018 HEMOGLOBIN: CPT

## 2024-03-08 PROCEDURE — C1713 ANCHOR/SCREW BN/BN,TIS/BN: HCPCS | Performed by: ORTHOPAEDIC SURGERY

## 2024-03-08 PROCEDURE — 87070 CULTURE OTHR SPECIMN AEROBIC: CPT

## 2024-03-08 PROCEDURE — 3E0T3BZ INTRODUCTION OF ANESTHETIC AGENT INTO PERIPHERAL NERVES AND PLEXI, PERCUTANEOUS APPROACH: ICD-10-PCS | Performed by: ORTHOPAEDIC SURGERY

## 2024-03-08 PROCEDURE — 6360000002 HC RX W HCPCS: Performed by: ANESTHESIOLOGY

## 2024-03-08 PROCEDURE — 86850 RBC ANTIBODY SCREEN: CPT

## 2024-03-08 PROCEDURE — 87186 SC STD MICRODIL/AGAR DIL: CPT

## 2024-03-08 PROCEDURE — 0SRD0J9 REPLACEMENT OF LEFT KNEE JOINT WITH SYNTHETIC SUBSTITUTE, CEMENTED, OPEN APPROACH: ICD-10-PCS | Performed by: ORTHOPAEDIC SURGERY

## 2024-03-08 PROCEDURE — 6360000002 HC RX W HCPCS: Performed by: ORTHOPAEDIC SURGERY

## 2024-03-08 PROCEDURE — 2720000010 HC SURG SUPPLY STERILE: Performed by: ORTHOPAEDIC SURGERY

## 2024-03-08 PROCEDURE — 7100000001 HC PACU RECOVERY - ADDTL 15 MIN: Performed by: ORTHOPAEDIC SURGERY

## 2024-03-08 PROCEDURE — 94761 N-INVAS EAR/PLS OXIMETRY MLT: CPT

## 2024-03-08 PROCEDURE — 3600000005 HC SURGERY LEVEL 5 BASE: Performed by: ORTHOPAEDIC SURGERY

## 2024-03-08 PROCEDURE — 87102 FUNGUS ISOLATION CULTURE: CPT

## 2024-03-08 PROCEDURE — 27487 REVISE/REPLACE KNEE JOINT: CPT | Performed by: ORTHOPAEDIC SURGERY

## 2024-03-08 PROCEDURE — 82962 GLUCOSE BLOOD TEST: CPT

## 2024-03-08 PROCEDURE — 2580000003 HC RX 258: Performed by: PHYSICIAN ASSISTANT

## 2024-03-08 PROCEDURE — 1100000000 HC RM PRIVATE

## 2024-03-08 PROCEDURE — 7100000000 HC PACU RECOVERY - FIRST 15 MIN: Performed by: ORTHOPAEDIC SURGERY

## 2024-03-08 PROCEDURE — 87077 CULTURE AEROBIC IDENTIFY: CPT

## 2024-03-08 PROCEDURE — 87206 SMEAR FLUORESCENT/ACID STAI: CPT

## 2024-03-08 PROCEDURE — 64447 NJX AA&/STRD FEMORAL NRV IMG: CPT | Performed by: ANESTHESIOLOGY

## 2024-03-08 PROCEDURE — 6360000002 HC RX W HCPCS: Performed by: PHYSICIAN ASSISTANT

## 2024-03-08 PROCEDURE — 87088 URINE BACTERIA CULTURE: CPT

## 2024-03-08 PROCEDURE — 6360000002 HC RX W HCPCS: Performed by: NURSE ANESTHETIST, CERTIFIED REGISTERED

## 2024-03-08 PROCEDURE — 87075 CULTR BACTERIA EXCEPT BLOOD: CPT

## 2024-03-08 PROCEDURE — 6370000000 HC RX 637 (ALT 250 FOR IP): Performed by: PHYSICIAN ASSISTANT

## 2024-03-08 PROCEDURE — 2580000003 HC RX 258: Performed by: ANESTHESIOLOGY

## 2024-03-08 PROCEDURE — C1776 JOINT DEVICE (IMPLANTABLE): HCPCS | Performed by: ORTHOPAEDIC SURGERY

## 2024-03-08 PROCEDURE — 87205 SMEAR GRAM STAIN: CPT

## 2024-03-08 PROCEDURE — 2709999900 HC NON-CHARGEABLE SUPPLY: Performed by: ORTHOPAEDIC SURGERY

## 2024-03-08 PROCEDURE — 85014 HEMATOCRIT: CPT

## 2024-03-08 PROCEDURE — 86901 BLOOD TYPING SEROLOGIC RH(D): CPT

## 2024-03-08 PROCEDURE — 3700000000 HC ANESTHESIA ATTENDED CARE: Performed by: ORTHOPAEDIC SURGERY

## 2024-03-08 PROCEDURE — 6370000000 HC RX 637 (ALT 250 FOR IP): Performed by: ANESTHESIOLOGY

## 2024-03-08 PROCEDURE — 36415 COLL VENOUS BLD VENIPUNCTURE: CPT

## 2024-03-08 PROCEDURE — 0SBC0ZZ EXCISION OF RIGHT KNEE JOINT, OPEN APPROACH: ICD-10-PCS | Performed by: ORTHOPAEDIC SURGERY

## 2024-03-08 PROCEDURE — 73560 X-RAY EXAM OF KNEE 1 OR 2: CPT

## 2024-03-08 PROCEDURE — 3700000001 HC ADD 15 MINUTES (ANESTHESIA): Performed by: ORTHOPAEDIC SURGERY

## 2024-03-08 PROCEDURE — 0SPD0JZ REMOVAL OF SYNTHETIC SUBSTITUTE FROM LEFT KNEE JOINT, OPEN APPROACH: ICD-10-PCS | Performed by: ORTHOPAEDIC SURGERY

## 2024-03-08 PROCEDURE — 3600000015 HC SURGERY LEVEL 5 ADDTL 15MIN: Performed by: ORTHOPAEDIC SURGERY

## 2024-03-08 PROCEDURE — 2580000003 HC RX 258: Performed by: NURSE ANESTHETIST, CERTIFIED REGISTERED

## 2024-03-08 PROCEDURE — 86900 BLOOD TYPING SEROLOGIC ABO: CPT

## 2024-03-08 PROCEDURE — 2500000003 HC RX 250 WO HCPCS: Performed by: NURSE ANESTHETIST, CERTIFIED REGISTERED

## 2024-03-08 DEVICE — POSTERIOR STABILIZED FEMORAL
Type: IMPLANTABLE DEVICE | Site: KNEE | Status: FUNCTIONAL
Brand: TRIATHLON

## 2024-03-08 DEVICE — STIMULAN® RAPID CURE PROVIDED STERILE FOR SINGLE PATIENT USE. STIMULAN® RAPID CURE CONTAINS CALCIUM SULFATE POWDER AND MIXING SOLUTION IN PRE-MEASURED QUANTITIES SO THAT WHEN MIXED TOGETHER IN A STERILE MIXING BOWL, THE RESULTANT PASTE IS TO BE DIGITALLY PACKED INTO OPEN BONE VOID/GAP TO SET INSITU OR PLACED INTO THE MOULD PROVIDED, THE MIXTURE SETS TO FORM BEADS. THE BIODEGRADABLE, RADIOPAQUE BEADS ARE RESORBED IN APPROXIMATELY 30 – 60 DAYS WHEN USED IN ACCORDANCE WITH THE DEVICE LABELLING. STIMULAN® RAPID CURE IS MANUFACTURED FROM SYNTHETIC IMPLANT GRADE CALCIUM SULFATE DIHYDRATE(CASO4.2H2O) THAT RESORBS AND IS REPLACED WITH BONE DURING THE HEALING PROCESS. ALSO, AS THE BONE VOID FILLER BEADS ARE BIODEGRADABLE AND BIOCOMPATIBLE, THEY MAY BE USED AT AN INFECTED SITE.
Type: IMPLANTABLE DEVICE | Site: KNEE | Status: FUNCTIONAL
Brand: STIMULAN® RAPID CURE

## 2024-03-08 DEVICE — UNIVERSAL TIBIAL BASEPLATE
Type: IMPLANTABLE DEVICE | Site: KNEE | Status: FUNCTIONAL
Brand: TRIATHLON

## 2024-03-08 DEVICE — PALACOS® R+G IS A FAST SETTING POLYMER CONTAINING GENTAMICIN, FOR USE IN BONE SURGERY. MIXING OF THE TWO COMPONENT SYSTEM, CONSISTING OF A POWDER AND A LIQUID, INITIALLY PRODUCES A LIQUID AND THEN A PASTE, WHICH IS USED TO ANCHOR THE PROSTHESIS TO THE BONE. THE HARDENED BONE CEMENT ALLOWS STABLE FIXATION OF THE PROSTHESIS AND TRANSFERS ALL STRESSES PRODUCED IN A MOVEMENT TO THE BONE VIA THE LARGE INTERFACE. INSOLUBLE ZIRCONIUM DIOXIDE IS INCLUDED IN THE CEMENT POWDER AS AN X RAY CONTRAST MEDIUM. THE CHLOROPHYLL ADDITIVE SERVES AS OPTICAL MARKING OF THE BONE CEMENT AT THE SITE OF THE OPERATION.
Type: IMPLANTABLE DEVICE | Site: KNEE | Status: FUNCTIONAL
Brand: PALACOS®

## 2024-03-08 DEVICE — FEMORAL DISTAL AUGMENT
Type: IMPLANTABLE DEVICE | Site: KNEE | Status: FUNCTIONAL
Brand: TRIATHLON

## 2024-03-08 DEVICE — TIBIAL BEARING INSERT - PS
Type: IMPLANTABLE DEVICE | Site: KNEE | Status: FUNCTIONAL
Brand: TRIATHLON

## 2024-03-08 DEVICE — POWDER SURG CELLERATE RX 1 GM HYDROL COLLEGEN: Type: IMPLANTABLE DEVICE | Site: KNEE | Status: FUNCTIONAL

## 2024-03-08 DEVICE — CEMENTED STEM
Type: IMPLANTABLE DEVICE | Site: KNEE | Status: FUNCTIONAL
Brand: TRIATHLON

## 2024-03-08 DEVICE — FEMORAL DISTAL FIXATION PEG
Type: IMPLANTABLE DEVICE | Site: KNEE | Status: FUNCTIONAL
Brand: TRIATHLON

## 2024-03-08 RX ORDER — SODIUM CHLORIDE 0.9 % (FLUSH) 0.9 %
5-40 SYRINGE (ML) INJECTION EVERY 12 HOURS SCHEDULED
Status: DISCONTINUED | OUTPATIENT
Start: 2024-03-08 | End: 2024-03-12 | Stop reason: HOSPADM

## 2024-03-08 RX ORDER — ACETAMINOPHEN 500 MG
1000 TABLET ORAL ONCE
Status: DISCONTINUED | OUTPATIENT
Start: 2024-03-08 | End: 2024-03-08

## 2024-03-08 RX ORDER — SODIUM CHLORIDE 0.9 % (FLUSH) 0.9 %
5-40 SYRINGE (ML) INJECTION PRN
Status: DISCONTINUED | OUTPATIENT
Start: 2024-03-08 | End: 2024-03-08

## 2024-03-08 RX ORDER — DIPHENHYDRAMINE HCL 25 MG
25 CAPSULE ORAL EVERY 6 HOURS PRN
Status: DISCONTINUED | OUTPATIENT
Start: 2024-03-08 | End: 2024-03-12 | Stop reason: HOSPADM

## 2024-03-08 RX ORDER — GENTAMICIN SULFATE 80 MG/100ML
INJECTION, SOLUTION INTRAVENOUS CONTINUOUS PRN
Status: COMPLETED | OUTPATIENT
Start: 2024-03-08 | End: 2024-03-08

## 2024-03-08 RX ORDER — MIDAZOLAM HYDROCHLORIDE 2 MG/2ML
2 INJECTION, SOLUTION INTRAMUSCULAR; INTRAVENOUS
Status: COMPLETED | OUTPATIENT
Start: 2024-03-08 | End: 2024-03-08

## 2024-03-08 RX ORDER — ROPIVACAINE HYDROCHLORIDE 2 MG/ML
INJECTION, SOLUTION EPIDURAL; INFILTRATION; PERINEURAL PRN
Status: DISCONTINUED | OUTPATIENT
Start: 2024-03-08 | End: 2024-03-08 | Stop reason: ALTCHOICE

## 2024-03-08 RX ORDER — ONDANSETRON 4 MG/1
4 TABLET, ORALLY DISINTEGRATING ORAL EVERY 8 HOURS PRN
Status: DISCONTINUED | OUTPATIENT
Start: 2024-03-08 | End: 2024-03-12 | Stop reason: HOSPADM

## 2024-03-08 RX ORDER — LIDOCAINE HYDROCHLORIDE 20 MG/ML
INJECTION, SOLUTION EPIDURAL; INFILTRATION; INTRACAUDAL; PERINEURAL PRN
Status: DISCONTINUED | OUTPATIENT
Start: 2024-03-08 | End: 2024-03-08 | Stop reason: SDUPTHER

## 2024-03-08 RX ORDER — ROPIVACAINE HYDROCHLORIDE 2 MG/ML
INJECTION, SOLUTION EPIDURAL; INFILTRATION; PERINEURAL
Status: COMPLETED | OUTPATIENT
Start: 2024-03-08 | End: 2024-03-08

## 2024-03-08 RX ORDER — SODIUM CHLORIDE 0.9 % (FLUSH) 0.9 %
5-40 SYRINGE (ML) INJECTION EVERY 12 HOURS SCHEDULED
Status: DISCONTINUED | OUTPATIENT
Start: 2024-03-08 | End: 2024-03-08 | Stop reason: HOSPADM

## 2024-03-08 RX ORDER — OXYCODONE HYDROCHLORIDE 5 MG/1
10 TABLET ORAL EVERY 4 HOURS PRN
Status: DISCONTINUED | OUTPATIENT
Start: 2024-03-08 | End: 2024-03-12 | Stop reason: SDUPTHER

## 2024-03-08 RX ORDER — BUSPIRONE HYDROCHLORIDE 5 MG/1
5 TABLET ORAL NIGHTLY PRN
Status: DISCONTINUED | OUTPATIENT
Start: 2024-03-08 | End: 2024-03-12 | Stop reason: HOSPADM

## 2024-03-08 RX ORDER — LOSARTAN POTASSIUM 50 MG/1
100 TABLET ORAL DAILY
Status: DISCONTINUED | OUTPATIENT
Start: 2024-03-09 | End: 2024-03-09

## 2024-03-08 RX ORDER — ACETAMINOPHEN 500 MG
1000 TABLET ORAL ONCE
Status: COMPLETED | OUTPATIENT
Start: 2024-03-08 | End: 2024-03-08

## 2024-03-08 RX ORDER — HYDROMORPHONE HYDROCHLORIDE 1 MG/ML
1 INJECTION, SOLUTION INTRAMUSCULAR; INTRAVENOUS; SUBCUTANEOUS
Status: DISCONTINUED | OUTPATIENT
Start: 2024-03-08 | End: 2024-03-12 | Stop reason: HOSPADM

## 2024-03-08 RX ORDER — GABAPENTIN 300 MG/1
300 CAPSULE ORAL DAILY
Status: DISCONTINUED | OUTPATIENT
Start: 2024-03-09 | End: 2024-03-12 | Stop reason: HOSPADM

## 2024-03-08 RX ORDER — CELECOXIB 100 MG/1
100 CAPSULE ORAL ONCE
Status: COMPLETED | OUTPATIENT
Start: 2024-03-08 | End: 2024-03-08

## 2024-03-08 RX ORDER — HYDROMORPHONE HYDROCHLORIDE 1 MG/ML
0.5 INJECTION, SOLUTION INTRAMUSCULAR; INTRAVENOUS; SUBCUTANEOUS EVERY 5 MIN PRN
Status: DISCONTINUED | OUTPATIENT
Start: 2024-03-08 | End: 2024-03-08 | Stop reason: HOSPADM

## 2024-03-08 RX ORDER — NALOXONE HYDROCHLORIDE 0.4 MG/ML
INJECTION, SOLUTION INTRAMUSCULAR; INTRAVENOUS; SUBCUTANEOUS PRN
Status: DISCONTINUED | OUTPATIENT
Start: 2024-03-08 | End: 2024-03-08 | Stop reason: HOSPADM

## 2024-03-08 RX ORDER — METHOCARBAMOL 750 MG/1
750 TABLET, FILM COATED ORAL 3 TIMES DAILY PRN
Status: DISCONTINUED | OUTPATIENT
Start: 2024-03-08 | End: 2024-03-12 | Stop reason: HOSPADM

## 2024-03-08 RX ORDER — BUPIVACAINE HYDROCHLORIDE 7.5 MG/ML
INJECTION, SOLUTION INTRASPINAL
Status: COMPLETED | OUTPATIENT
Start: 2024-03-08 | End: 2024-03-08

## 2024-03-08 RX ORDER — SODIUM CHLORIDE 9 MG/ML
INJECTION, SOLUTION INTRAVENOUS PRN
Status: DISCONTINUED | OUTPATIENT
Start: 2024-03-08 | End: 2024-03-12 | Stop reason: HOSPADM

## 2024-03-08 RX ORDER — SODIUM CHLORIDE 0.9 % (FLUSH) 0.9 %
5-40 SYRINGE (ML) INJECTION PRN
Status: DISCONTINUED | OUTPATIENT
Start: 2024-03-08 | End: 2024-03-08 | Stop reason: HOSPADM

## 2024-03-08 RX ORDER — SODIUM CHLORIDE 0.9 % (FLUSH) 0.9 %
5-40 SYRINGE (ML) INJECTION EVERY 12 HOURS SCHEDULED
Status: DISCONTINUED | OUTPATIENT
Start: 2024-03-08 | End: 2024-03-08

## 2024-03-08 RX ORDER — SENNA AND DOCUSATE SODIUM 50; 8.6 MG/1; MG/1
1 TABLET, FILM COATED ORAL 2 TIMES DAILY
Status: DISCONTINUED | OUTPATIENT
Start: 2024-03-08 | End: 2024-03-12 | Stop reason: HOSPADM

## 2024-03-08 RX ORDER — SODIUM CHLORIDE 9 MG/ML
INJECTION, SOLUTION INTRAVENOUS PRN
Status: DISCONTINUED | OUTPATIENT
Start: 2024-03-08 | End: 2024-03-08

## 2024-03-08 RX ORDER — PROCHLORPERAZINE EDISYLATE 5 MG/ML
5 INJECTION INTRAMUSCULAR; INTRAVENOUS
Status: COMPLETED | OUTPATIENT
Start: 2024-03-08 | End: 2024-03-08

## 2024-03-08 RX ORDER — BUPROPION HYDROCHLORIDE 100 MG/1
100 TABLET, EXTENDED RELEASE ORAL DAILY
Status: DISCONTINUED | OUTPATIENT
Start: 2024-03-09 | End: 2024-03-12 | Stop reason: HOSPADM

## 2024-03-08 RX ORDER — TRANEXAMIC ACID 100 MG/ML
INJECTION, SOLUTION INTRAVENOUS PRN
Status: DISCONTINUED | OUTPATIENT
Start: 2024-03-08 | End: 2024-03-08 | Stop reason: SDUPTHER

## 2024-03-08 RX ORDER — FENTANYL CITRATE 50 UG/ML
100 INJECTION, SOLUTION INTRAMUSCULAR; INTRAVENOUS
Status: DISCONTINUED | OUTPATIENT
Start: 2024-03-08 | End: 2024-03-08 | Stop reason: HOSPADM

## 2024-03-08 RX ORDER — ONDANSETRON 2 MG/ML
4 INJECTION INTRAMUSCULAR; INTRAVENOUS EVERY 6 HOURS PRN
Status: DISCONTINUED | OUTPATIENT
Start: 2024-03-08 | End: 2024-03-12 | Stop reason: HOSPADM

## 2024-03-08 RX ORDER — EPHEDRINE SULFATE 5 MG/ML
INJECTION INTRAVENOUS PRN
Status: DISCONTINUED | OUTPATIENT
Start: 2024-03-08 | End: 2024-03-08 | Stop reason: SDUPTHER

## 2024-03-08 RX ORDER — OXYCODONE HYDROCHLORIDE 5 MG/1
5 TABLET ORAL
Status: COMPLETED | OUTPATIENT
Start: 2024-03-08 | End: 2024-03-08

## 2024-03-08 RX ORDER — ESCITALOPRAM OXALATE 10 MG/1
20 TABLET ORAL DAILY
Status: DISCONTINUED | OUTPATIENT
Start: 2024-03-09 | End: 2024-03-12 | Stop reason: HOSPADM

## 2024-03-08 RX ORDER — DEXAMETHASONE SODIUM PHOSPHATE 4 MG/ML
INJECTION, SOLUTION INTRA-ARTICULAR; INTRALESIONAL; INTRAMUSCULAR; INTRAVENOUS; SOFT TISSUE
Status: COMPLETED | OUTPATIENT
Start: 2024-03-08 | End: 2024-03-08

## 2024-03-08 RX ORDER — ACETAMINOPHEN 500 MG
1000 TABLET ORAL EVERY 6 HOURS
Status: DISCONTINUED | OUTPATIENT
Start: 2024-03-09 | End: 2024-03-12 | Stop reason: HOSPADM

## 2024-03-08 RX ORDER — ONDANSETRON 2 MG/ML
INJECTION INTRAMUSCULAR; INTRAVENOUS PRN
Status: DISCONTINUED | OUTPATIENT
Start: 2024-03-08 | End: 2024-03-08 | Stop reason: SDUPTHER

## 2024-03-08 RX ORDER — SODIUM CHLORIDE 0.9 % (FLUSH) 0.9 %
5-40 SYRINGE (ML) INJECTION PRN
Status: DISCONTINUED | OUTPATIENT
Start: 2024-03-08 | End: 2024-03-12 | Stop reason: HOSPADM

## 2024-03-08 RX ORDER — HALOPERIDOL 5 MG/ML
1 INJECTION INTRAMUSCULAR
Status: DISCONTINUED | OUTPATIENT
Start: 2024-03-08 | End: 2024-03-08 | Stop reason: HOSPADM

## 2024-03-08 RX ORDER — SODIUM CHLORIDE, SODIUM LACTATE, POTASSIUM CHLORIDE, CALCIUM CHLORIDE 600; 310; 30; 20 MG/100ML; MG/100ML; MG/100ML; MG/100ML
INJECTION, SOLUTION INTRAVENOUS CONTINUOUS
Status: DISCONTINUED | OUTPATIENT
Start: 2024-03-08 | End: 2024-03-08 | Stop reason: HOSPADM

## 2024-03-08 RX ORDER — LIDOCAINE HYDROCHLORIDE 10 MG/ML
1 INJECTION, SOLUTION INFILTRATION; PERINEURAL
Status: DISCONTINUED | OUTPATIENT
Start: 2024-03-08 | End: 2024-03-08 | Stop reason: HOSPADM

## 2024-03-08 RX ORDER — AMLODIPINE BESYLATE 5 MG/1
5 TABLET ORAL DAILY
Status: DISCONTINUED | OUTPATIENT
Start: 2024-03-09 | End: 2024-03-09

## 2024-03-08 RX ORDER — DIPHENHYDRAMINE HYDROCHLORIDE 50 MG/ML
25 INJECTION INTRAMUSCULAR; INTRAVENOUS EVERY 6 HOURS PRN
Status: DISCONTINUED | OUTPATIENT
Start: 2024-03-08 | End: 2024-03-12 | Stop reason: HOSPADM

## 2024-03-08 RX ORDER — PROPOFOL 10 MG/ML
INJECTION, EMULSION INTRAVENOUS PRN
Status: DISCONTINUED | OUTPATIENT
Start: 2024-03-08 | End: 2024-03-08 | Stop reason: SDUPTHER

## 2024-03-08 RX ORDER — ASPIRIN 81 MG/1
81 TABLET ORAL 2 TIMES DAILY
Status: DISCONTINUED | OUTPATIENT
Start: 2024-03-08 | End: 2024-03-12 | Stop reason: HOSPADM

## 2024-03-08 RX ORDER — ALPRAZOLAM 0.5 MG/1
1 TABLET ORAL 2 TIMES DAILY
Status: DISCONTINUED | OUTPATIENT
Start: 2024-03-08 | End: 2024-03-12 | Stop reason: HOSPADM

## 2024-03-08 RX ORDER — SODIUM CHLORIDE 9 MG/ML
INJECTION, SOLUTION INTRAVENOUS PRN
Status: DISCONTINUED | OUTPATIENT
Start: 2024-03-08 | End: 2024-03-08 | Stop reason: HOSPADM

## 2024-03-08 RX ORDER — ZOLPIDEM TARTRATE 5 MG/1
5 TABLET ORAL NIGHTLY PRN
Status: DISCONTINUED | OUTPATIENT
Start: 2024-03-08 | End: 2024-03-12 | Stop reason: HOSPADM

## 2024-03-08 RX ORDER — VANCOMYCIN HYDROCHLORIDE 1 G/20ML
INJECTION, POWDER, LYOPHILIZED, FOR SOLUTION INTRAVENOUS PRN
Status: DISCONTINUED | OUTPATIENT
Start: 2024-03-08 | End: 2024-03-08 | Stop reason: ALTCHOICE

## 2024-03-08 RX ADMIN — SODIUM CHLORIDE, SODIUM LACTATE, POTASSIUM CHLORIDE, AND CALCIUM CHLORIDE: 600; 310; 30; 20 INJECTION, SOLUTION INTRAVENOUS at 11:52

## 2024-03-08 RX ADMIN — PHENYLEPHRINE HYDROCHLORIDE 30 MCG/MIN: 10 INJECTION INTRAVENOUS at 15:03

## 2024-03-08 RX ADMIN — SODIUM CHLORIDE, SODIUM LACTATE, POTASSIUM CHLORIDE, AND CALCIUM CHLORIDE: 600; 310; 30; 20 INJECTION, SOLUTION INTRAVENOUS at 16:56

## 2024-03-08 RX ADMIN — DOCUSATE SODIUM 50MG AND SENNOSIDES 8.6MG 1 TABLET: 8.6; 5 TABLET, FILM COATED ORAL at 22:04

## 2024-03-08 RX ADMIN — ACETAMINOPHEN 1000 MG: 500 TABLET, FILM COATED ORAL at 11:45

## 2024-03-08 RX ADMIN — PHENYLEPHRINE HYDROCHLORIDE 100 MCG: 0.1 INJECTION, SOLUTION INTRAVENOUS at 14:55

## 2024-03-08 RX ADMIN — LIDOCAINE HYDROCHLORIDE 60 MG: 20 INJECTION, SOLUTION EPIDURAL; INFILTRATION; INTRACAUDAL; PERINEURAL at 14:33

## 2024-03-08 RX ADMIN — DEXAMETHASONE SODIUM PHOSPHATE 4 MG: 4 INJECTION, SOLUTION INTRAMUSCULAR; INTRAVENOUS at 13:33

## 2024-03-08 RX ADMIN — BUPIVACAINE HYDROCHLORIDE IN DEXTROSE 14.25 MG: 7.5 INJECTION, SOLUTION SUBARACHNOID at 14:25

## 2024-03-08 RX ADMIN — PHENYLEPHRINE HYDROCHLORIDE 50 MCG: 0.1 INJECTION, SOLUTION INTRAVENOUS at 16:02

## 2024-03-08 RX ADMIN — TRANEXAMIC ACID 1000 MG: 100 INJECTION, SOLUTION INTRAVENOUS at 14:25

## 2024-03-08 RX ADMIN — Medication 2500 MG: at 22:11

## 2024-03-08 RX ADMIN — SODIUM CHLORIDE, PRESERVATIVE FREE 10 ML: 5 INJECTION INTRAVENOUS at 22:12

## 2024-03-08 RX ADMIN — CEFAZOLIN 3000 MG: 10 INJECTION, POWDER, FOR SOLUTION INTRAVENOUS at 22:04

## 2024-03-08 RX ADMIN — PROPOFOL 50 MG: 10 INJECTION, EMULSION INTRAVENOUS at 14:33

## 2024-03-08 RX ADMIN — ASPIRIN 81 MG: 81 TABLET, COATED ORAL at 22:05

## 2024-03-08 RX ADMIN — ALPRAZOLAM 1 MG: 0.5 TABLET ORAL at 22:03

## 2024-03-08 RX ADMIN — EPHEDRINE SULFATE 15 MG: 5 INJECTION INTRAVENOUS at 14:37

## 2024-03-08 RX ADMIN — SODIUM CHLORIDE, SODIUM LACTATE, POTASSIUM CHLORIDE, AND CALCIUM CHLORIDE: 600; 310; 30; 20 INJECTION, SOLUTION INTRAVENOUS at 14:50

## 2024-03-08 RX ADMIN — PROCHLORPERAZINE EDISYLATE 5 MG: 5 INJECTION INTRAMUSCULAR; INTRAVENOUS at 19:16

## 2024-03-08 RX ADMIN — OXYCODONE 10 MG: 5 TABLET ORAL at 22:03

## 2024-03-08 RX ADMIN — Medication 3000 MG: at 14:25

## 2024-03-08 RX ADMIN — MIDAZOLAM HYDROCHLORIDE 2 MG: 1 INJECTION, SOLUTION INTRAMUSCULAR; INTRAVENOUS at 13:33

## 2024-03-08 RX ADMIN — CELECOXIB 100 MG: 100 CAPSULE ORAL at 11:45

## 2024-03-08 RX ADMIN — ROPIVACAINE HYDROCHLORIDE 20 ML: 2 INJECTION, SOLUTION EPIDURAL; INFILTRATION at 13:33

## 2024-03-08 RX ADMIN — ONDANSETRON 4 MG: 2 INJECTION INTRAMUSCULAR; INTRAVENOUS at 18:45

## 2024-03-08 RX ADMIN — OXYCODONE 5 MG: 5 TABLET ORAL at 19:16

## 2024-03-08 RX ADMIN — PROPOFOL 120 MCG/KG/MIN: 10 INJECTION, EMULSION INTRAVENOUS at 14:34

## 2024-03-08 RX ADMIN — PHENYLEPHRINE HYDROCHLORIDE 100 MCG: 0.1 INJECTION, SOLUTION INTRAVENOUS at 14:45

## 2024-03-08 RX ADMIN — TRANEXAMIC ACID 1000 MG: 100 INJECTION, SOLUTION INTRAVENOUS at 17:53

## 2024-03-08 ASSESSMENT — PAIN DESCRIPTION - ORIENTATION: ORIENTATION: LEFT

## 2024-03-08 ASSESSMENT — PAIN SCALES - GENERAL
PAINLEVEL_OUTOF10: 2
PAINLEVEL_OUTOF10: 4
PAINLEVEL_OUTOF10: 6

## 2024-03-08 ASSESSMENT — PAIN DESCRIPTION - ONSET: ONSET: GRADUAL

## 2024-03-08 ASSESSMENT — PAIN SCALES - WONG BAKER
WONGBAKER_NUMERICALRESPONSE: NO HURT
WONGBAKER_NUMERICALRESPONSE: 0
WONGBAKER_NUMERICALRESPONSE: 0
WONGBAKER_NUMERICALRESPONSE: NO HURT

## 2024-03-08 ASSESSMENT — PAIN DESCRIPTION - PAIN TYPE: TYPE: SURGICAL PAIN

## 2024-03-08 ASSESSMENT — PAIN DESCRIPTION - LOCATION: LOCATION: KNEE

## 2024-03-08 ASSESSMENT — PAIN - FUNCTIONAL ASSESSMENT: PAIN_FUNCTIONAL_ASSESSMENT: 0-10

## 2024-03-08 ASSESSMENT — PAIN DESCRIPTION - DESCRIPTORS: DESCRIPTORS: DISCOMFORT

## 2024-03-08 ASSESSMENT — PAIN DESCRIPTION - FREQUENCY: FREQUENCY: INTERMITTENT

## 2024-03-08 NOTE — ANESTHESIA PROCEDURE NOTES
Peripheral Block    Patient location during procedure: pre-op  Reason for block: post-op pain management and at surgeon's request  Start time: 3/8/2024 1:33 PM  End time: 3/8/2024 1:35 PM  Staffing  Performed: anesthesiologist   Anesthesiologist: Gunnar Whitfield MD  Performed by: Gunnar Whitfield MD  Authorized by: Hal Mejía MD    Preanesthetic Checklist  Completed: patient identified, IV checked, site marked, risks and benefits discussed, surgical/procedural consents, equipment checked, pre-op evaluation, timeout performed, anesthesia consent given, oxygen available, monitors applied/VS acknowledged and blood product R/B/A discussed and consented  Peripheral Block   Patient position: supine  Prep: ChloraPrep  Provider prep: sterile gloves and mask  Patient monitoring: continuous pulse ox, cardiac monitor, frequent blood pressure checks, IV access, oxygen and responsive to questions  Block type: Femoral  Adductor canal  Laterality: left  Injection technique: single-shot  Guidance: ultrasound guided  Local infiltration: lidocaine  Infiltration strength: 1 %  Local infiltration: lidocaine  Dose: 3 mL    Needle   Needle type: insulated echogenic nerve stimulator needle   Needle gauge: 20 G  Needle localization: ultrasound guidance  Needle length: 10 cm  Assessment   Injection assessment: negative aspiration for heme, no paresthesia on injection, no intravascular symptoms and low pressure verified by pressure monitor  Paresthesia pain: none  Slow fractionated injection: yes  Hemodynamics: stable  Outcomes: uncomplicated    Medications Administered  ropivacaine (NAROPIN) injection 0.2% - Perineural   20 mL - 3/8/2024 1:33:00 PM  dexAMETHasone (DECADRON) injection 4 mg/mL - Perineural   4 mg - 3/8/2024 1:33:00 PM

## 2024-03-08 NOTE — INTERVAL H&P NOTE
Update History & Physical    The patient's History and Physical of March 7, 2024 was reviewed with the patient and I examined the patient. There was no change. The surgical site was confirmed by the patient and me.     Plan: The risks, benefits, expected outcome, and alternative to the recommended procedure have been discussed with the patient. Patient understands and wants to proceed with the procedure.     Electronically signed by JUAN DIEGO NAIR JR, MD on 3/8/2024 at 1:12 PM

## 2024-03-08 NOTE — ANESTHESIA PROCEDURE NOTES
Spinal Block    Patient location during procedure: OR  End time: 3/8/2024 2:35 PM  Reason for block: primary anesthetic  Staffing  Performed: anesthesiologist   Anesthesiologist: Gunnar Whitfield MD  Performed by: Gunnar Whitfield MD  Authorized by: Hal Mejía MD    Spinal Block  Patient position: sitting  Prep: ChloraPrep  Patient monitoring: continuous pulse ox and oxygen  Approach: midline  Location: L3/L4  Provider prep: mask and sterile gloves  Local infiltration: lidocaine  Needle  Needle type: pencil-tip   Needle gauge: 25 G  Needle length: 3.5 in  Assessment  Swirl obtained: Yes  CSF: clear  Attempts: 1  Hemodynamics: stable  Additional Notes  Uneventful spinal block  Preanesthetic Checklist  Completed: patient identified, IV checked, site marked, risks and benefits discussed, surgical/procedural consents, equipment checked, pre-op evaluation, timeout performed, anesthesia consent given, oxygen available, monitors applied/VS acknowledged, fire risk safety assessment completed and verbalized and blood product R/B/A discussed and consented

## 2024-03-08 NOTE — ANESTHESIA PRE PROCEDURE
of the aortic valve. AV mean gradient is 9 mmHg.    12/20    Remote cath nl per pt       Neuro/Psych:   (+) depression/anxiety             GI/Hepatic/Renal:   (+) renal disease (CR 1.35): CRI, morbid obesity     (-) GERD       Endo/Other:    (+) DiabetesType II DM, well controlled.                  ROS comment: Spinal stenosis Abdominal:             Vascular:          Other Findings:             Anesthesia Plan      spinal     ASA 3     (OETT discussed as alternative)  Induction: intravenous.      Anesthetic plan and risks discussed with patient and spouse.    Use of blood products discussed with patient whom consented to blood products.            Post-op pain plan if not by surgeon: single peripheral nerve block            Hal Mejía MD   3/8/2024

## 2024-03-09 ENCOUNTER — HOME HEALTH ADMISSION (OUTPATIENT)
Dept: HOME HEALTH SERVICES | Facility: HOME HEALTH | Age: 64
End: 2024-03-09
Payer: COMMERCIAL

## 2024-03-09 PROBLEM — N17.9 AKI (ACUTE KIDNEY INJURY) (HCC): Status: ACTIVE | Noted: 2024-03-09

## 2024-03-09 PROBLEM — F41.9 ANXIETY AND DEPRESSION: Status: ACTIVE | Noted: 2024-03-09

## 2024-03-09 PROBLEM — F32.A ANXIETY AND DEPRESSION: Status: ACTIVE | Noted: 2024-03-09

## 2024-03-09 PROBLEM — D64.9 ANEMIA: Status: ACTIVE | Noted: 2024-03-09

## 2024-03-09 LAB
BASOPHILS # BLD: 0 K/UL (ref 0–0.2)
BASOPHILS NFR BLD: 0 % (ref 0–2)
CREAT SERPL-MCNC: 1.62 MG/DL (ref 0.8–1.5)
DIFFERENTIAL METHOD BLD: ABNORMAL
EOSINOPHIL # BLD: 0 K/UL (ref 0–0.8)
EOSINOPHIL NFR BLD: 0 % (ref 0.5–7.8)
ERYTHROCYTE [DISTWIDTH] IN BLOOD BY AUTOMATED COUNT: 12.8 % (ref 11.9–14.6)
HCT VFR BLD AUTO: 25.7 % (ref 41.1–50.3)
HCT VFR BLD AUTO: 26.3 % (ref 41.1–50.3)
HGB BLD-MCNC: 8.5 G/DL (ref 13.6–17.2)
HGB BLD-MCNC: 8.6 G/DL (ref 13.6–17.2)
IMM GRANULOCYTES # BLD AUTO: 0 K/UL (ref 0–0.5)
IMM GRANULOCYTES NFR BLD AUTO: 1 % (ref 0–5)
LYMPHOCYTES # BLD: 0.8 K/UL (ref 0.5–4.6)
LYMPHOCYTES NFR BLD: 9 % (ref 13–44)
MCH RBC QN AUTO: 30.2 PG (ref 26.1–32.9)
MCHC RBC AUTO-ENTMCNC: 32.7 G/DL (ref 31.4–35)
MCV RBC AUTO: 92.3 FL (ref 82–102)
MONOCYTES # BLD: 0.5 K/UL (ref 0.1–1.3)
MONOCYTES NFR BLD: 5 % (ref 4–12)
NEUTS SEG # BLD: 7.2 K/UL (ref 1.7–8.2)
NEUTS SEG NFR BLD: 85 % (ref 43–78)
NRBC # BLD: 0 K/UL (ref 0–0.2)
PLATELET # BLD AUTO: 242 K/UL (ref 150–450)
PMV BLD AUTO: 10.8 FL (ref 9.4–12.3)
RBC # BLD AUTO: 2.85 M/UL (ref 4.23–5.6)
VANCOMYCIN SERPL-MCNC: 21.5 UG/ML
WBC # BLD AUTO: 8.5 K/UL (ref 4.3–11.1)

## 2024-03-09 PROCEDURE — 6370000000 HC RX 637 (ALT 250 FOR IP): Performed by: PHYSICIAN ASSISTANT

## 2024-03-09 PROCEDURE — 82565 ASSAY OF CREATININE: CPT

## 2024-03-09 PROCEDURE — 85025 COMPLETE CBC W/AUTO DIFF WBC: CPT

## 2024-03-09 PROCEDURE — 80202 ASSAY OF VANCOMYCIN: CPT

## 2024-03-09 PROCEDURE — 2580000003 HC RX 258: Performed by: PHYSICIAN ASSISTANT

## 2024-03-09 PROCEDURE — 2580000003 HC RX 258: Performed by: FAMILY MEDICINE

## 2024-03-09 PROCEDURE — 6370000000 HC RX 637 (ALT 250 FOR IP): Performed by: NURSE PRACTITIONER

## 2024-03-09 PROCEDURE — 6360000002 HC RX W HCPCS: Performed by: FAMILY MEDICINE

## 2024-03-09 PROCEDURE — 97110 THERAPEUTIC EXERCISES: CPT

## 2024-03-09 PROCEDURE — 2580000003 HC RX 258: Performed by: NURSE PRACTITIONER

## 2024-03-09 PROCEDURE — 97165 OT EVAL LOW COMPLEX 30 MIN: CPT

## 2024-03-09 PROCEDURE — 6360000002 HC RX W HCPCS: Performed by: NURSE PRACTITIONER

## 2024-03-09 PROCEDURE — 85018 HEMOGLOBIN: CPT

## 2024-03-09 PROCEDURE — 6360000002 HC RX W HCPCS: Performed by: PHYSICIAN ASSISTANT

## 2024-03-09 PROCEDURE — 6370000000 HC RX 637 (ALT 250 FOR IP): Performed by: ORTHOPAEDIC SURGERY

## 2024-03-09 PROCEDURE — 97530 THERAPEUTIC ACTIVITIES: CPT

## 2024-03-09 PROCEDURE — 97535 SELF CARE MNGMENT TRAINING: CPT

## 2024-03-09 PROCEDURE — 36415 COLL VENOUS BLD VENIPUNCTURE: CPT

## 2024-03-09 PROCEDURE — 1100000000 HC RM PRIVATE

## 2024-03-09 PROCEDURE — 97161 PT EVAL LOW COMPLEX 20 MIN: CPT

## 2024-03-09 PROCEDURE — 85014 HEMATOCRIT: CPT

## 2024-03-09 RX ORDER — ATORVASTATIN CALCIUM 10 MG/1
20 TABLET, FILM COATED ORAL DAILY
Status: DISCONTINUED | OUTPATIENT
Start: 2024-03-09 | End: 2024-03-12 | Stop reason: HOSPADM

## 2024-03-09 RX ORDER — SODIUM CHLORIDE, SODIUM LACTATE, POTASSIUM CHLORIDE, CALCIUM CHLORIDE 600; 310; 30; 20 MG/100ML; MG/100ML; MG/100ML; MG/100ML
INJECTION, SOLUTION INTRAVENOUS CONTINUOUS
Status: DISCONTINUED | OUTPATIENT
Start: 2024-03-09 | End: 2024-03-11

## 2024-03-09 RX ORDER — LOSARTAN POTASSIUM 50 MG/1
50 TABLET ORAL DAILY
Status: DISCONTINUED | OUTPATIENT
Start: 2024-03-09 | End: 2024-03-10

## 2024-03-09 RX ORDER — AMLODIPINE BESYLATE 10 MG/1
10 TABLET ORAL DAILY
Status: DISCONTINUED | OUTPATIENT
Start: 2024-03-09 | End: 2024-03-12 | Stop reason: HOSPADM

## 2024-03-09 RX ADMIN — CEFAZOLIN 3000 MG: 10 INJECTION, POWDER, FOR SOLUTION INTRAVENOUS at 06:39

## 2024-03-09 RX ADMIN — GABAPENTIN 300 MG: 300 CAPSULE ORAL at 08:21

## 2024-03-09 RX ADMIN — DOCUSATE SODIUM 50MG AND SENNOSIDES 8.6MG 1 TABLET: 8.6; 5 TABLET, FILM COATED ORAL at 08:22

## 2024-03-09 RX ADMIN — ACETAMINOPHEN 1000 MG: 500 TABLET, FILM COATED ORAL at 12:01

## 2024-03-09 RX ADMIN — SODIUM CHLORIDE, PRESERVATIVE FREE 10 ML: 5 INJECTION INTRAVENOUS at 22:21

## 2024-03-09 RX ADMIN — LOSARTAN POTASSIUM 50 MG: 50 TABLET, FILM COATED ORAL at 08:22

## 2024-03-09 RX ADMIN — OXYCODONE 10 MG: 5 TABLET ORAL at 02:34

## 2024-03-09 RX ADMIN — OXYCODONE 10 MG: 5 TABLET ORAL at 20:52

## 2024-03-09 RX ADMIN — ASPIRIN 81 MG: 81 TABLET, COATED ORAL at 20:53

## 2024-03-09 RX ADMIN — ACETAMINOPHEN 1000 MG: 500 TABLET, FILM COATED ORAL at 22:20

## 2024-03-09 RX ADMIN — BUPROPION HYDROCHLORIDE 100 MG: 100 TABLET, EXTENDED RELEASE ORAL at 08:22

## 2024-03-09 RX ADMIN — ACETAMINOPHEN 1000 MG: 500 TABLET, FILM COATED ORAL at 06:38

## 2024-03-09 RX ADMIN — ALPRAZOLAM 1 MG: 0.5 TABLET ORAL at 20:53

## 2024-03-09 RX ADMIN — ACETAMINOPHEN 1000 MG: 500 TABLET, FILM COATED ORAL at 18:01

## 2024-03-09 RX ADMIN — OXYCODONE 10 MG: 5 TABLET ORAL at 13:47

## 2024-03-09 RX ADMIN — PIPERACILLIN AND TAZOBACTAM 4500 MG: 4; .5 INJECTION, POWDER, FOR SOLUTION INTRAVENOUS at 09:52

## 2024-03-09 RX ADMIN — ASPIRIN 81 MG: 81 TABLET, COATED ORAL at 08:22

## 2024-03-09 RX ADMIN — ESCITALOPRAM OXALATE 20 MG: 10 TABLET ORAL at 08:21

## 2024-03-09 RX ADMIN — ATORVASTATIN CALCIUM 20 MG: 10 TABLET, FILM COATED ORAL at 08:21

## 2024-03-09 RX ADMIN — PIPERACILLIN AND TAZOBACTAM 3375 MG: 3; .375 INJECTION, POWDER, LYOPHILIZED, FOR SOLUTION INTRAVENOUS at 22:25

## 2024-03-09 RX ADMIN — ZOLPIDEM TARTRATE 5 MG: 5 TABLET ORAL at 22:21

## 2024-03-09 RX ADMIN — VANCOMYCIN HYDROCHLORIDE 1500 MG: 10 INJECTION, POWDER, LYOPHILIZED, FOR SOLUTION INTRAVENOUS at 22:24

## 2024-03-09 RX ADMIN — ACETAMINOPHEN 1000 MG: 500 TABLET, FILM COATED ORAL at 00:43

## 2024-03-09 RX ADMIN — SODIUM CHLORIDE, PRESERVATIVE FREE 10 ML: 5 INJECTION INTRAVENOUS at 08:27

## 2024-03-09 RX ADMIN — DOCUSATE SODIUM 50MG AND SENNOSIDES 8.6MG 1 TABLET: 8.6; 5 TABLET, FILM COATED ORAL at 20:52

## 2024-03-09 RX ADMIN — PIPERACILLIN AND TAZOBACTAM 3375 MG: 3; .375 INJECTION, POWDER, LYOPHILIZED, FOR SOLUTION INTRAVENOUS at 15:57

## 2024-03-09 RX ADMIN — OXYCODONE 10 MG: 5 TABLET ORAL at 06:39

## 2024-03-09 RX ADMIN — SODIUM CHLORIDE, POTASSIUM CHLORIDE, SODIUM LACTATE AND CALCIUM CHLORIDE: 600; 310; 30; 20 INJECTION, SOLUTION INTRAVENOUS at 11:58

## 2024-03-09 RX ADMIN — ALPRAZOLAM 1 MG: 0.5 TABLET ORAL at 08:22

## 2024-03-09 RX ADMIN — AMLODIPINE BESYLATE 10 MG: 10 TABLET ORAL at 08:22

## 2024-03-09 ASSESSMENT — PAIN SCALES - WONG BAKER
WONGBAKER_NUMERICALRESPONSE: NO HURT
WONGBAKER_NUMERICALRESPONSE: 0

## 2024-03-09 ASSESSMENT — KOOS JR
HOW SEVERE IS YOUR KNEE STIFFNESS AFTER FIRST WAKING IN MORNING: 1
TWISING OR PIVOTING ON KNEE: 1
STRAIGHTENING KNEE FULLY: 1
STRAIGHTENING KNEE FULLY: MILD
RISING FROM SITTING: MODERATE
BENDING TO THE FLOOR TO PICK UP OBJECT: MODERATE
KOOS JR TOTAL INTERVAL SCORE: 59.381
STANDING UPRIGHT: MODERATE
GOING UP OR DOWN STAIRS: MODERATE
GOING UP OR DOWN STAIRS: 2
RISING FROM SITTING: 2
HOW SEVERE IS YOUR KNEE STIFFNESS AFTER FIRST WAKING IN MORNING: MILD
STANDING UPRIGHT: 2
TWISING OR PIVOTING ON KNEE: MILD
BENDING TO THE FLOOR TO PICK UP OBJECT: 2

## 2024-03-09 ASSESSMENT — PAIN DESCRIPTION - ORIENTATION
ORIENTATION: LEFT

## 2024-03-09 ASSESSMENT — PAIN DESCRIPTION - DESCRIPTORS
DESCRIPTORS: ACHING

## 2024-03-09 ASSESSMENT — PAIN DESCRIPTION - LOCATION
LOCATION: KNEE
LOCATION: LEG;KNEE
LOCATION: KNEE

## 2024-03-09 ASSESSMENT — PAIN SCALES - GENERAL
PAINLEVEL_OUTOF10: 5
PAINLEVEL_OUTOF10: 5
PAINLEVEL_OUTOF10: 4
PAINLEVEL_OUTOF10: 7
PAINLEVEL_OUTOF10: 6
PAINLEVEL_OUTOF10: 5

## 2024-03-09 NOTE — ANESTHESIA POSTPROCEDURE EVALUATION
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    Lucia Phase I: Lucia Score: 10    Lucia Phase II:      Anesthesia Post Evaluation    Patient location during evaluation: PACU  Patient participation: complete - patient participated  Level of consciousness: awake and alert  Airway patency: patent  Nausea & Vomiting: no nausea and no vomiting  Cardiovascular status: hemodynamically stable  Respiratory status: acceptable, nonlabored ventilation and spontaneous ventilation  Hydration status: euvolemic  Comments: BP (!) 130/52   Pulse 98   Temp 97.9 °F (36.6 °C) (Oral)   Resp 18   Ht 1.88 m (6' 2.02\")   Wt (!) 140.2 kg (309 lb)   SpO2 97%   BMI 39.66 kg/m²     Multimodal analgesia pain management approach  Pain management: adequate and satisfactory to patient    No notable events documented.

## 2024-03-09 NOTE — PERIOP NOTE
TRANSFER - OUT REPORT:    Verbal report given to ARLYN Conti on Gilbert Yo  being transferred to [facility] for routine post-op       Report consisted of patient's Situation, Background, Assessment and   Recommendations(SBAR).     Information from the following report(s) Nurse Handoff Report and Cardiac Rhythm SR  was reviewed with the receiving nurse.           Lines:   Peripheral IV 03/08/24 Posterior;Right Hand (Active)   Site Assessment Clean, dry & intact 03/08/24 1910   Line Status Infusing 03/08/24 1910   Phlebitis Assessment No symptoms 03/08/24 1910   Infiltration Assessment 0 03/08/24 1910   Alcohol Cap Used No 03/08/24 1910   Dressing Status Clean, dry & intact 03/08/24 1910   Dressing Type Transparent 03/08/24 1910        Opportunity for questions and clarification was provided.      Patient transported with:  O2 @ room air lpm

## 2024-03-09 NOTE — CARE COORDINATION
63 yr-old M with LTKA I & D and revision.  LTKA 2/9 and home with [facility] and a RW.  Lives at home with spouse.  Referral made to [facility].       03/09/24 7068   Service Assessment   Patient Orientation Alert and Oriented   Cognition Alert   History Provided By Patient   Primary Caregiver Self   Support Systems Spouse/Significant Other   Patient's Healthcare Decision Maker is: Legal Next of Kin   PCP Verified by CM Yes   Last Visit to PCP Within last 3 months   Prior Functional Level Independent in ADLs/IADLs   Current Functional Level Independent in ADLs/IADLs   Can patient return to prior living arrangement Yes   Ability to make needs known: Good   Family able to assist with home care needs: Yes   Would you like for me to discuss the discharge plan with any other family members/significant others, and if so, who? No   Financial Resources Medicare   Community Resources Other (Comment)  (n/a)   Services At/After Discharge   Transition of Care Consult (CM Consult) Home Health   Internal Home Health Yes   Condition of Participation: Discharge Planning   The Plan for Transition of Care is related to the following treatment goals: Increase independence   The Patient and/or Patient Representative was provided with a Choice of Provider? Patient   The Patient and/Or Patient Representative agree with the Discharge Plan? Yes

## 2024-03-09 NOTE — PROGRESS NOTES
03/08/24 2120   Oxygen Therapy/Pulse Ox   O2 Therapy Room air   O2 Device None (Room air)   Pulse 98   SpO2 97 %   Pulse Oximeter Device Mode Continuous   Pulse Oximeter Device Location Right;Finger   $Pulse Oximeter $Spot check (multiple/continuous)     Pt on continuous monitor for HS. Alarm limits set. Pt working on IS.

## 2024-03-09 NOTE — PROGRESS NOTES
TRANSFER - IN REPORT:    Verbal report received from ARLYN Simon on Gilbert Yo  being received from [facility] PACU for routine post-op      Report consisted of patient's Situation, Background, Assessment and   Recommendations(SBAR).     Information from the following report(s) Nurse Handoff Report, Surgery Report, and Event Log was reviewed with the receiving nurse.    Opportunity for questions and clarification was provided.      Assessment completed upon patient's arrival to unit and care assumed.

## 2024-03-09 NOTE — PROGRESS NOTES
VANCO NOTE RENAL INSUFFICIENCY PATIENT   [facility]   Pharmacy Pharmacokinetic Monitoring Service - Vancomycin    Consulting Provider: Alberto   Indication: Prosthetic Joint Infection  Target Concentration: Random level ? 20 mg/L  Day of Therapy: 2  Additional Antimicrobials: Zosyn    Pertinent Laboratory Values:   Wt Readings from Last 1 Encounters:   03/08/24 (!) 140.2 kg (309 lb)     Temp Readings from Last 1 Encounters:   03/08/24 97.9 °F (36.6 °C) (Oral)     Recent Labs     03/07/24  1335   BUN 38*   CREATININE 2.25*   WBC 9.9       No results found for: \"VANCOTROUGH\", \"VANCORANDOM\"    MRSA Nasal Swab: N/A. Non-respiratory infection.    Assessment:  Date:  Dose/Freq Admin Times Level/Time:   3/8/24 2500 mg x1 2211    3/9/24 (1500 mg x 1)  21.5 @ 0559                       Plan:  Concentration-guided dosing due to renal impairment  Random level is supra-therapeutic   Give vancomycin 1500 mg x 1 dose on 3/9 @ 2100   Repeat vancomycin concentration ordered for 3/10 @ 0600    Pharmacy will continue to monitor patient and adjust therapy as indicated    Thank you for the consult,  Ruthy Lino RPH

## 2024-03-09 NOTE — PROGRESS NOTES
ACUTE PHYSICAL THERAPY GOALS:   (Developed with and agreed upon by patient and/or caregiver.)  GOALS (1-4 days):  (1.)Mr. Yo will move from supine to sit and sit to supine  in bed with STAND BY ASSIST.  (2.)Mr. Yo will transfer from bed to chair and chair to bed with STAND BY ASSIST using the least restrictive device.  (3.)Mr. Yo will ambulate with STAND BY ASSIST for 250 feet with the least restrictive device.  (4.)Mr. Yo will ambulate up/down 3 steps with L railing with CONTACT GUARD ASSIST.    ________________________________________________________________________________________________           PHYSICAL THERAPY: TOTAL KNEE ARTHROPLASTY-revision Daily Note and PM  (Link to Caseload Tracking: PT Visit Days : 1  KNEE IMMOBILIZER AT ALL TIMES  Acknowledge Orders  Time In/Out  PT Charge Capture  Rehab Caseload Tracker  Episode   Gilbert Yo is a 63 y.o. 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]  Hx of total knee arthroplasty, left [Z96.652]  Open wound of left knee, initial encounter [S81.002A]  Procedure(s) (LRB):  KNEE TOTAL ARTHROPLASTY REVISION (Left)  1 Day Post-Op  Reason for Referral: Pain in Left Knee (M25.562)  Stiffness of Left Knee, Not elsewhere classified (M25.662)  Difficulty in walking, Not elsewhere classified (R26.2)  Other abnormalities of gait and mobility (R26.89)  Inpatient: Payor: MEDICARE / Plan: MEDICARE PART A / Product Type: *No Product type* /     REHAB RECOMMENDATIONS:   Recommendation to date pending progress:  Setting:  Home Health Therapy    Equipment:    None       GAIT: I Mod I S SBA CGA Min Mod Max Total  NT x2 Comments:   Level of Assistance [] [] [] [x] [x] [] [] [] [] [] []            Weightbearing Status  Left Lower Extremity Weight Bearing: Weight Bearing As Tolerated (with knee immobilizer)    Distance  200 feet    Gait Quality Antalgic, Decreased abigail ,

## 2024-03-09 NOTE — CONSULTS
[facility] Hospitalist Consult   Admit Date:  3/8/2024 11:08 AM   Name:  Gilbert Yo   Age:  63 y.o.  Sex:  male  :  1960   MRN:  [id number]   Room:  [facility]/    Presenting Complaint: No chief complaint on file.    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 male with history of anxiety, depression, hypertension, hyperlipidemia, obesity, BPH, ED, anemia, insomnia, overactive bladder, chronic back pain, who had a left total knee with Dr. Dominguez approximately 4 weeks ago.  Went home with a wound VAC.  It appears he continued to have some drainage to 1 small area of the surgical incision.  Was admitted yesterday 3/8/2024 for an irrigation and debridement of the left knee.  Wound cultures were obtained.  Wound VAC placed postoperatively and patient placed in a knee immobilizer.    Hospitalist were asked to see patient in consultation for his comorbidities such as hypertension, as well as his postop knee infection.  Infectious disease was consulted as well.    ROS: Patient is up in room w therapy and getting ready to shower. Denies CP, SOB, HA / vision changes, GI sx, F/C. No complaints.   I introduced myself, the hospitalist role, went over VS, labs, tx plan for his knee infection and HUGO. Answered all questions at the bedside.     Assessment & Plan:     Principal Problem:  Open wound of left knee, initial encounter  Infection of total left knee replacement (HCC  Hx of total knee arthroplasty, left  Plan: Irrigation and debridement of left knee on 3/8/2024 with Dr. Dominguez  Estimated blood loss 400 cc  I have reviewed patient's labs and vital signs  MRSA swab neg  Sed rate, crp elevated  WBC ct

## 2024-03-09 NOTE — PROGRESS NOTES
VANCO NOTE RENAL INSUFFICIENCY PATIENT   [facility]   Pharmacy Pharmacokinetic Monitoring Service - Vancomycin    Consulting Provider: Alberto   Indication: Prosthetic Joint Infection  Target Concentration: Random level ? 20 mg/L  Day of Therapy: 0  Additional Antimicrobials: Ancef    Pertinent Laboratory Values:   Wt Readings from Last 1 Encounters:   03/08/24 (!) 140.2 kg (309 lb)     Temp Readings from Last 1 Encounters:   03/08/24 97.9 °F (36.6 °C) (Oral)     Recent Labs     03/07/24  1335   BUN 38*   CREATININE 2.25*   WBC 9.9       No results found for: \"VANCOTROUGH\", \"VANCORANDOM\"    MRSA Nasal Swab: N/A. Non-respiratory infection.    Assessment:  Date:  Dose/Freq Admin Times Level/Time:   3/8 2500mg IV x1 (21)       Rd @ (06)                       Plan:  Concentration-guided dosing due to renal impairment  Start vancomycin dosing intermittnetly  Vancomycin concentrations will be ordered as clinically appropriate   Pharmacy will continue to monitor patient and adjust therapy as indicated    Thank you for the consult,  Ben Flood, PharmD, BCPS

## 2024-03-09 NOTE — PROGRESS NOTES
Procedure(s) (per grid) utilized to improve and/or restore self-care/home management as related to dressing, bathing, toileting, grooming, self feeding, and functional mobility . Required minimal visual, verbal, manual, and tactile cueing to facilitate activities of daily living skills, compensatory activities, and OT poc, discharge planning, KI  and wearing schedule, adl task performance, shower safety,walker safety, wound vac, and ice machine .      AFTER TREATMENT PRECAUTIONS: Bed/Chair Locked, Call light within reach, Chair, Needs within reach, and RN at bedside    INTERDISCIPLINARY COLLABORATION:  RN/ PCT, PT/ PTA, and OT/ DARLING    Interdisciplinary Patient Education  (Reference education tab)    [x] Safe And Effective Hygiene  [x] Fall Precautions  [] Hip Precautions  [] D/C Instruction Review [x] Self Care Training and Home Safety  [x] Walker Management/Safety  [x] Adaptive Equipment as Needed  [x] Therapeutic Resting Position of Joint     TOTAL TREATMENT DURATION AND TIME:  Time In: 0820  Time Out: 0950  Minutes: 90    Edwige Boykin, OT

## 2024-03-09 NOTE — PROGRESS NOTES
ACUTE PHYSICAL THERAPY GOALS:   (Developed with and agreed upon by patient and/or caregiver.)  GOALS (1-4 days):  (1.)Mr. Yo will move from supine to sit and sit to supine  in bed with STAND BY ASSIST.  (2.)Mr. Yo will transfer from bed to chair and chair to bed with STAND BY ASSIST using the least restrictive device.  (3.)Mr. Yo will ambulate with STAND BY ASSIST for 250 feet with the least restrictive device.  (4.)Mr. Yo will ambulate up/down 3 steps with L railing with CONTACT GUARD ASSIST.    ________________________________________________________________________________________________           PHYSICAL THERAPY: TOTAL KNEE ARTHROPLASTY-revision Initial Assessment  (Link to Caseload Tracking: PT Visit Days : 1  KNEE IMMOBILIZER AT ALL TIMES  Acknowledge Orders  Time In/Out  PT Charge Capture  Rehab Caseload Tracker  Episode   Gilbert Yo is a 63 y.o. 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]  Hx of total knee arthroplasty, left [Z96.652]  Open wound of left knee, initial encounter [S81.002A]  Procedure(s) (LRB):  KNEE TOTAL ARTHROPLASTY REVISION (Left)  1 Day Post-Op  Reason for Referral: Pain in Left Knee (M25.562)  Stiffness of Left Knee, Not elsewhere classified (M25.662)  Difficulty in walking, Not elsewhere classified (R26.2)  Other abnormalities of gait and mobility (R26.89)  Inpatient: Payor: MEDICARE / Plan: MEDICARE PART A / Product Type: *No Product type* /     REHAB RECOMMENDATIONS:   Recommendation to date pending progress:  Setting:  Home Health Therapy    Equipment:    None       GAIT: I Mod I S SBA CGA Min Mod Max Total  NT x2 Comments:   Level of Assistance [] [] [] [x] [x] [] [] [] [] [] []            Weightbearing Status  Left Lower Extremity Weight Bearing: Weight Bearing As Tolerated (with knee immobilizer)    Distance  150 feet    Gait Quality Antalgic, Decreased abigail ,  progresses.    RECOMMENDATIONS/INTENT FOR NEXT TREATMENT SESSION: Treatment next visit will focus on increasing Mr. Yo's independence with bed mobility, transfers, gait training, strength/ROM exercises, modalities for pain, and patient education.     TIME IN/OUT:  Time In: 0830  Time Out: 0900  Minutes: 30    YURI CASTRO, PT

## 2024-03-09 NOTE — CONSULTS
Infectious Disease Consult    Today's Date: 3/9/2024   Admit Date: 3/8/2024  1960  Chief Complaint:    Impression:   L knee arthroplasty infection, initial surgery 2/9/24, s/p I&D with removal and replacement of hardware 3/8/24, op cx pending   Recent syncopal episode  Morbid obesity    Plan:    Continue current antibiotics and follow pending cultures.     Anti-infectives:   Vancomycin (3/8 -  Zosyn (3/8-  Cefazolin (3/8-3/9)  Bactrim (3/5 -3/7)    Subjective:   Date of Consultation:  March 9, 2024  Referring Physician: Юлия Dominguez    Patient is a 63 y.o. male with an underlying medical history that includes hypertension, hyperlipidemia, obesity, spinal stenosis and L knee osteoarthritis with L patellar tendon rupture and repair. On 2/9/24 he underwent robotic-assisted total knee arthroplasty. He was seen in the ED 2/19 with olfactory hallucinations, thought to be related to benzodiazepine withdrawal, and then represented to the ED 2/25/24 after an episode of syncope, elevated D-dimer and troponin and ECG with noted unchanged R BBB. On 3/4/24 he had some yellow cloudy drainage from his incision, and was seen by Dr. Dominguez 3/5, wound vac placed and he was started on Bactrim - he reports he took about four doses prior to surgery.  Xray showed arthroplasty was stable. He was readmitted 3/8/24 for I&D, with findings of purulent material and the hardware was removed and replaced. Cultures are pending. Nasal MRSA swab negative 3/7/24, but previously positive 1/30/24. His creatinine on admission was elevated at 2.25 above prior (was 1.35 1/30/24). He is afebrile and has been started on antibiotics as noted above. ID has been asked for further recommendations.     Patient Active Problem List   Diagnosis    Essential hypertension, benign    Obesity, morbid (HCC)    Spinal stenosis, lumbar region, with neurogenic claudication    S/P colonoscopy    Cough    Former smoker    Cardiac murmur    History of bradycardia    Lines/Devices:  Intact, no erythema, drainage or tenderness   Psych: Alert and oriented, appropriate mood and affect given the setting       Data Review:     CBC:  Recent Labs     03/07/24  1335 03/08/24  2051 03/09/24  0559 03/09/24  0812   WBC 9.9  --   --  8.5   HGB 11.0* 9.2* 8.5* 8.6*   HCT 33.1* 28.0* 25.7* 26.3*     --   --  242       BMP:  Recent Labs     03/07/24  1335   BUN 38*      K 4.5      CO2 24       LFTS:  No results for input(s): \"ALT\", \"TP\", \"ALB\" in the last 72 hours.    Invalid input(s): \"TBILI\", \"SGOT\", \"AP\"    Microbiology:   Pertinent findings noted above.     Imaging:   Reviewed and pertinent findings noted above.    Signed By: Iraida Gustafson, APRN - CNP     March 9, 2024

## 2024-03-09 NOTE — OP NOTE
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  The complexity of the total joint surgery requires the use of a first assistant for positioning, retraction and expertise in closure.     Tourniquet Time: 60 minutes minutes  EBL: less than 400 cc  Findings: The patient was found to have drainage from 1 central hole in the middle portion of his incision.  There was some skin blistering along the distal end of the incision.  Upon opening and excising the old incision purulent material was evacuated.  There was 1 small opening in the capsule at the superior lateral aspect that communicated with the bursa.  And it was felt that this could represent an infected total knee or at least communication with the superficial infection.  On opening the knee there was some fluid and some inflammatory synovium.  It was found that the femoral and tibial components were well-fixed.  There was no damage to the plastic or to the patella polyethylene.    Gilbert Yo was brought to the operating room and positioned on the operating table.  He was anethestized with anesthesia.  IV antibiotics were administered. Prior to the incision being made a timeout was called identifying the patient, procedure ,operative side and surgeon..  A pneumatic tourniquet was placed about the limb and the left leg was prepped and draped in the usual sterile manner.  The tourniquet was inflated.  An anterior longitudinal incision was accomplished just medial to  Hal Dominguez MD  3/8/2024,  7:10 PM

## 2024-03-09 NOTE — PROGRESS NOTES
[facility]   Progress Note    Patient: Gilbert Yo MRN: [id number]  SSN: [id number]    YOB: 1960  Age: 63 y.o.  Sex: male      Admit Date: 3/8/2024    LOS: 1 day     Subjective:   lt KNEE TOTAL ARTHROPLASTY ROBOTIC - Left  2/9/2024  26    No complaints, doing well. Pain controlled and slept Ok    Objective:     Vitals:    03/09/24 0150 03/09/24 0304 03/09/24 0529 03/09/24 0807   BP: (!) 103/56  133/61 120/60   Pulse: 88  91 94   Resp: 17 18  16   Temp: 97.7 °F (36.5 °C)  97.5 °F (36.4 °C)    TempSrc: Oral  Oral    SpO2: 93%   92%   Weight:       Height:            Intake and Output:  Current Shift: No intake/output data recorded.  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 *      Plan:     D/c home    Signed By: Drew Ron MD     March 9, 2024

## 2024-03-10 PROBLEM — E83.42 HYPOMAGNESEMIA: Status: ACTIVE | Noted: 2024-03-10

## 2024-03-10 LAB
ANION GAP SERPL CALC-SCNC: 4 MMOL/L (ref 2–11)
BASOPHILS # BLD: 0 K/UL (ref 0–0.2)
BASOPHILS NFR BLD: 0 % (ref 0–2)
BUN SERPL-MCNC: 26 MG/DL (ref 8–23)
CALCIUM SERPL-MCNC: 9.3 MG/DL (ref 8.3–10.4)
CHLORIDE SERPL-SCNC: 108 MMOL/L (ref 103–113)
CO2 SERPL-SCNC: 27 MMOL/L (ref 21–32)
CREAT SERPL-MCNC: 1.52 MG/DL (ref 0.8–1.5)
CRP SERPL-MCNC: 2.4 MG/DL (ref 0–0.9)
DIFFERENTIAL METHOD BLD: ABNORMAL
EOSINOPHIL # BLD: 0.1 K/UL (ref 0–0.8)
EOSINOPHIL NFR BLD: 1 % (ref 0.5–7.8)
ERYTHROCYTE [DISTWIDTH] IN BLOOD BY AUTOMATED COUNT: 13.1 % (ref 11.9–14.6)
ERYTHROCYTE [SEDIMENTATION RATE] IN BLOOD: 19 MM/HR (ref 0–15)
GLUCOSE SERPL-MCNC: 108 MG/DL (ref 65–100)
HCT VFR BLD AUTO: 23.2 % (ref 41.1–50.3)
HGB BLD-MCNC: 7.3 G/DL (ref 13.6–17.2)
IMM GRANULOCYTES # BLD AUTO: 0.1 K/UL (ref 0–0.5)
IMM GRANULOCYTES NFR BLD AUTO: 1 % (ref 0–5)
LYMPHOCYTES # BLD: 1.8 K/UL (ref 0.5–4.6)
LYMPHOCYTES NFR BLD: 24 % (ref 13–44)
MAGNESIUM SERPL-MCNC: 1.5 MG/DL (ref 1.8–2.4)
MCH RBC QN AUTO: 30.2 PG (ref 26.1–32.9)
MCHC RBC AUTO-ENTMCNC: 31.5 G/DL (ref 31.4–35)
MCV RBC AUTO: 95.9 FL (ref 82–102)
MONOCYTES # BLD: 0.8 K/UL (ref 0.1–1.3)
MONOCYTES NFR BLD: 11 % (ref 4–12)
NEUTS SEG # BLD: 4.8 K/UL (ref 1.7–8.2)
NEUTS SEG NFR BLD: 63 % (ref 43–78)
NRBC # BLD: 0 K/UL (ref 0–0.2)
PLATELET # BLD AUTO: 202 K/UL (ref 150–450)
PMV BLD AUTO: 11.5 FL (ref 9.4–12.3)
POTASSIUM SERPL-SCNC: 4.3 MMOL/L (ref 3.5–5.1)
RBC # BLD AUTO: 2.42 M/UL (ref 4.23–5.6)
SODIUM SERPL-SCNC: 139 MMOL/L (ref 136–146)
VANCOMYCIN SERPL-MCNC: 20.7 UG/ML
WBC # BLD AUTO: 7.6 K/UL (ref 4.3–11.1)

## 2024-03-10 PROCEDURE — 86140 C-REACTIVE PROTEIN: CPT

## 2024-03-10 PROCEDURE — 2580000003 HC RX 258: Performed by: INTERNAL MEDICINE

## 2024-03-10 PROCEDURE — 99024 POSTOP FOLLOW-UP VISIT: CPT | Performed by: ORTHOPAEDIC SURGERY

## 2024-03-10 PROCEDURE — 85652 RBC SED RATE AUTOMATED: CPT

## 2024-03-10 PROCEDURE — 2580000003 HC RX 258: Performed by: NURSE PRACTITIONER

## 2024-03-10 PROCEDURE — 80048 BASIC METABOLIC PNL TOTAL CA: CPT

## 2024-03-10 PROCEDURE — 6370000000 HC RX 637 (ALT 250 FOR IP): Performed by: INTERNAL MEDICINE

## 2024-03-10 PROCEDURE — 36415 COLL VENOUS BLD VENIPUNCTURE: CPT

## 2024-03-10 PROCEDURE — 6360000002 HC RX W HCPCS: Performed by: INTERNAL MEDICINE

## 2024-03-10 PROCEDURE — 97530 THERAPEUTIC ACTIVITIES: CPT

## 2024-03-10 PROCEDURE — 6360000002 HC RX W HCPCS: Performed by: NURSE PRACTITIONER

## 2024-03-10 PROCEDURE — 6370000000 HC RX 637 (ALT 250 FOR IP): Performed by: NURSE PRACTITIONER

## 2024-03-10 PROCEDURE — 80202 ASSAY OF VANCOMYCIN: CPT

## 2024-03-10 PROCEDURE — 6360000002 HC RX W HCPCS: Performed by: ORTHOPAEDIC SURGERY

## 2024-03-10 PROCEDURE — 6370000000 HC RX 637 (ALT 250 FOR IP): Performed by: PHYSICIAN ASSISTANT

## 2024-03-10 PROCEDURE — 2580000003 HC RX 258: Performed by: ORTHOPAEDIC SURGERY

## 2024-03-10 PROCEDURE — 83735 ASSAY OF MAGNESIUM: CPT

## 2024-03-10 PROCEDURE — 85025 COMPLETE CBC W/AUTO DIFF WBC: CPT

## 2024-03-10 PROCEDURE — 1100000000 HC RM PRIVATE

## 2024-03-10 RX ORDER — LOSARTAN POTASSIUM 25 MG/1
25 TABLET ORAL DAILY
Status: DISCONTINUED | OUTPATIENT
Start: 2024-03-10 | End: 2024-03-12 | Stop reason: HOSPADM

## 2024-03-10 RX ORDER — MAGNESIUM SULFATE IN WATER 40 MG/ML
2000 INJECTION, SOLUTION INTRAVENOUS ONCE
Status: COMPLETED | OUTPATIENT
Start: 2024-03-10 | End: 2024-03-10

## 2024-03-10 RX ORDER — RIFAMPIN 300 MG/1
300 CAPSULE ORAL EVERY 12 HOURS SCHEDULED
Status: DISCONTINUED | OUTPATIENT
Start: 2024-03-10 | End: 2024-03-12 | Stop reason: HOSPADM

## 2024-03-10 RX ADMIN — Medication 2000 MG: at 21:16

## 2024-03-10 RX ADMIN — GABAPENTIN 300 MG: 300 CAPSULE ORAL at 08:50

## 2024-03-10 RX ADMIN — PIPERACILLIN AND TAZOBACTAM 3375 MG: 3; .375 INJECTION, POWDER, LYOPHILIZED, FOR SOLUTION INTRAVENOUS at 05:57

## 2024-03-10 RX ADMIN — ACETAMINOPHEN 1000 MG: 500 TABLET, FILM COATED ORAL at 12:46

## 2024-03-10 RX ADMIN — OXYCODONE 10 MG: 5 TABLET ORAL at 18:07

## 2024-03-10 RX ADMIN — ASPIRIN 81 MG: 81 TABLET, COATED ORAL at 21:15

## 2024-03-10 RX ADMIN — Medication 2000 MG: at 12:46

## 2024-03-10 RX ADMIN — OXYCODONE 10 MG: 5 TABLET ORAL at 09:52

## 2024-03-10 RX ADMIN — LOSARTAN POTASSIUM 25 MG: 25 TABLET, FILM COATED ORAL at 09:53

## 2024-03-10 RX ADMIN — RIFAMPIN 300 MG: 300 CAPSULE ORAL at 21:15

## 2024-03-10 RX ADMIN — DOCUSATE SODIUM 50MG AND SENNOSIDES 8.6MG 1 TABLET: 8.6; 5 TABLET, FILM COATED ORAL at 08:50

## 2024-03-10 RX ADMIN — BUPROPION HYDROCHLORIDE 100 MG: 100 TABLET, EXTENDED RELEASE ORAL at 08:50

## 2024-03-10 RX ADMIN — VANCOMYCIN HYDROCHLORIDE 1500 MG: 10 INJECTION, POWDER, LYOPHILIZED, FOR SOLUTION INTRAVENOUS at 22:57

## 2024-03-10 RX ADMIN — ACETAMINOPHEN 1000 MG: 500 TABLET, FILM COATED ORAL at 18:07

## 2024-03-10 RX ADMIN — AMLODIPINE BESYLATE 10 MG: 10 TABLET ORAL at 08:50

## 2024-03-10 RX ADMIN — ALPRAZOLAM 1 MG: 0.5 TABLET ORAL at 21:15

## 2024-03-10 RX ADMIN — ACETAMINOPHEN 1000 MG: 500 TABLET, FILM COATED ORAL at 05:53

## 2024-03-10 RX ADMIN — ESCITALOPRAM OXALATE 20 MG: 10 TABLET ORAL at 08:50

## 2024-03-10 RX ADMIN — ASPIRIN 81 MG: 81 TABLET, COATED ORAL at 08:50

## 2024-03-10 RX ADMIN — OXYCODONE 10 MG: 5 TABLET ORAL at 22:52

## 2024-03-10 RX ADMIN — OXYCODONE 10 MG: 5 TABLET ORAL at 14:34

## 2024-03-10 RX ADMIN — MAGNESIUM SULFATE HEPTAHYDRATE 2000 MG: 40 INJECTION, SOLUTION INTRAVENOUS at 10:09

## 2024-03-10 RX ADMIN — ALPRAZOLAM 1 MG: 0.5 TABLET ORAL at 08:50

## 2024-03-10 RX ADMIN — ACETAMINOPHEN 1000 MG: 500 TABLET, FILM COATED ORAL at 22:52

## 2024-03-10 RX ADMIN — ATORVASTATIN CALCIUM 20 MG: 10 TABLET, FILM COATED ORAL at 08:50

## 2024-03-10 RX ADMIN — OXYCODONE 10 MG: 5 TABLET ORAL at 05:52

## 2024-03-10 ASSESSMENT — PAIN SCALES - WONG BAKER
WONGBAKER_NUMERICALRESPONSE: 0
WONGBAKER_NUMERICALRESPONSE: NO HURT

## 2024-03-10 ASSESSMENT — PAIN DESCRIPTION - LOCATION
LOCATION: KNEE
LOCATION: LEG;KNEE

## 2024-03-10 ASSESSMENT — PAIN SCALES - GENERAL
PAINLEVEL_OUTOF10: 4
PAINLEVEL_OUTOF10: 4
PAINLEVEL_OUTOF10: 2
PAINLEVEL_OUTOF10: 8
PAINLEVEL_OUTOF10: 3
PAINLEVEL_OUTOF10: 6
PAINLEVEL_OUTOF10: 3
PAINLEVEL_OUTOF10: 6
PAINLEVEL_OUTOF10: 9

## 2024-03-10 ASSESSMENT — PAIN DESCRIPTION - ORIENTATION
ORIENTATION: LEFT

## 2024-03-10 ASSESSMENT — PAIN DESCRIPTION - DESCRIPTORS
DESCRIPTORS: ACHING

## 2024-03-10 NOTE — PROGRESS NOTES
ID chart review    S aureus growing from operative cultures.  Stop zosyn. Start cefazolin plus rifampin.  Continue vancomycin  --request PICC  --we will follow up tomorrow    Leeroy Hdez MD

## 2024-03-10 NOTE — PROGRESS NOTES
Total  NT x2 Comments:   Level of Assistance [] [] [] [x] [] [] [] [] [] [] []            Weightbearing Status  Left Lower Extremity Weight Bearing: Weight Bearing As Tolerated (with KI on)    Distance  480 feet    Gait Quality Antalgic, Decreased abigail , Decreased step length, Decreased stance, and Hip hike    DME Rolling Walker and Knee immobilizer       Stairs Stairs/Curb  Stairs?: Yes  Stairs  # Steps : 3  Rails: Left ascending  Device: No Device  Assistance: Stand by assistance    Ramp N/A    I=Independent, Mod I=Modified Independent, S=Supervision, SBA=Standby Assistance, CGA=Contact Guard Assistance,   Min=Minimal Assistance, Mod=Moderate Assistance, Max=Maximal Assistance, Total=Total Assistance, NT=Not Tested    ASSESSMENT:   ASSESSMENT:  Mr. Yo showed  increased gait distance & improved level of assist for bed mobility, transfers & gait. This pt is waiting for cultures, currently on a wound vac with KI re-applied for more secure fit. This pt is at a SBA level  & goals were modified to reflect progress made. This pt will likely just transition to out pt PT when Dc'ed , since he was about to start out pt PT before this set back with infection.  In pm session ,pt reviewed exercises & performed functional mobility without difficulty. Pt should do well once off IV's, KI & wound vac. Will follow up in am.       Outcome Measure:   KOOS-JR:  KOJr MAURICE. Knee Survey Score: 11    SUBJECTIVE:   Mr. Yo states, no concerns    Home Environment/Prior Level of Function Lives With: Alone (currently  but reconciling; wife will be available to assist)  Type of Home: House  Home Layout: One level  Home Access: Stairs to enter with rails  Entrance Stairs - Number of Steps: 3  Entrance Stairs - Rails: Left  Bathroom Shower/Tub: Walk-in shower  Home Equipment: Walker, rolling    OBJECTIVE:     PAIN: VITAL SIGNS: LINES/DRAINS:   Pre Treatment:         Post Treatment: 2/10 Vitals NT       Oxygen NT RA    IV and  progresses.    RECOMMENDATIONS/INTENT FOR NEXT TREATMENT SESSION: Treatment next visit will focus on increasing Mr. Yo's independence with bed mobility, transfers, gait training, strength/ROM exercises, modalities for pain, and patient education.     TIME IN/OUT:  Time In: 1611  Time Out: 1634  Minutes: 23    Jose Miguel Lincoln, PT

## 2024-03-10 NOTE — PROGRESS NOTES
ACUTE PHYSICAL THERAPY GOALS:   (Developed with and agreed upon by patient and/or caregiver.)  GOALS (1-4 days):  (1.)Mr. Yo will move from supine to sit and sit to supine  in bed with STAND BY ASSIST. Met 3/10  (2.)Mr. Yo will transfer from bed to chair and chair to bed with STAND BY ASSIST using the least restrictive device. Met 3/10  (3.)Mr. Yo will ambulate with STAND BY ASSIST for 250 feet with the least restrictive device.Met 3/10  (4.)Mr. Yo will ambulate up/down 3 steps with L railing with CONTACT GUARD ASSIST.Met 3/10    NEW GOALS 3/10/24 :  1) bed mobility with supervision.  2) transfers with walker & KI with supervision  3) pt ambulating 500 ft with rolling walker & KI with supervision.  4) pt able to go up & down 3 steps with left rail & SBA.    ________________________________________________________________________________________________           PHYSICAL THERAPY: TOTAL KNEE ARTHROPLASTY-revision Re-evaluation and AM  (Link to Caseload Tracking: PT Visit Days : 1  KNEE IMMOBILIZER AT ALL TIMES  Acknowledge Orders  Time In/Out  PT Charge Capture  Rehab Caseload Tracker  Episode   Gilbert Yo is a 63 y.o. 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]  Hx of total knee arthroplasty, left [Z96.652]  Open wound of left knee, initial encounter [S81.002A]  Procedure(s) (LRB):  KNEE TOTAL ARTHROPLASTY REVISION (Left)  2 Days Post-Op  Reason for Referral: Pain in Left Knee (M25.562)  Stiffness of Left Knee, Not elsewhere classified (M25.662)  Difficulty in walking, Not elsewhere classified (R26.2)  Other abnormalities of gait and mobility (R26.89)  Inpatient: Payor: MEDICARE / Plan: MEDICARE PART A / Product Type: *No Product type* /     REHAB RECOMMENDATIONS:   Recommendation to date pending progress:  Setting:  Home Health Therapy    Equipment:    None       GAIT: I Mod I S SBA CGA Min Mod Max Total     Standing Dynamic [] [] [x] [x] [] [] With RW     PLAN:   FREQUENCY AND DURATION: BID for duration of hospital stay or until stated goals are met, whichever comes first.    THERAPY PROGNOSIS: Good    PROBLEM LIST:   (Skilled intervention is medically necessary to address:)  Decreased ADL/Functional Activities, Decreased Activity Tolerance, Decreased AROM/PROM, Decreased Gait Ability, Decreased Strength, Decreased Transfer Abilities, and Increased Pain   INTERVENTIONS PLANNED:   (Benefits and precautions of physical therapy have been discussed with the patient.)  Therapeutic Activity, Therapeutic Exercise/HEP, Gait Training, Modalities, and Education       TREATMENT:       TREATMENT:   Therapeutic Activity (31 Minutes): Therapeutic activity included TKA exercises as noted, review of POC & PT expectations for today's activity, bed mobility, transfers, progressive gait training 480 ft & stair training, re-applied KI  for better fit, reviewed use of cryotherapy for pain & edema control , reviewed the expected progression to out pt PT on DC to improve functional Activity tolerance, Balance, Coordination, Mobility, Strength, and ROM.    TREATMENT GRID:  THERAPEUTIC  EXERCISES: DATE:  3/9/24 DATE:  3/10 DATE:      AM PM AM PM AM PM    [] [] [] [] [] []   Ankle Pumps 15 15 20      Quad Sets 15 15 20      Gluteal Sets 15 15 20      Hip Abd/ADduction 15 15 20      Straight Leg Raises 15 AA 15 AA 20      Knee Slides ------- -----       Short Arc Quads -------- ------       Chair Slides -------- ------                         B = bilateral; AA = active assistive; A = active; P = passive      EDUCATION:  [x] Home Exercises  [x] Fall Precautions  [x] No Pillow Under Knee  [] D/C Instruction Review [] Cryocuff  [x] Walker Management/Safety  [] Adaptive Equipment as Needed     Interdisciplinary Patient Education   (Reference education tab)    AFTER TREATMENT PRECAUTIONS: Bed/Chair Locked, Call light within reach, Chair, Needs

## 2024-03-10 NOTE — PROGRESS NOTES
VANCO DAILY FOLLOW UP NOTE  [facility]   Pharmacy Pharmacokinetic Monitoring Service - Vancomycin    Consulting Provider: Dr. Dominguez   Indication: prosthetic joint infection   Target Concentration: Goal AUC/VICTOR MANUEL 400-600 mg*hr/L  Day of Therapy: 3  Additional Antimicrobials: Zosyn    Pertinent Laboratory Values:   Wt Readings from Last 1 Encounters:   03/08/24 (!) 140.2 kg (309 lb)     Temp Readings from Last 1 Encounters:   03/10/24 97.5 °F (36.4 °C) (Oral)     Recent Labs     03/07/24  1335 03/09/24  0559 03/09/24  0812 03/10/24  0616   BUN 38*  --   --  26*   CREATININE 2.25* 1.62*  --  1.52*   WBC 9.9  --  8.5 7.6     Estimated Creatinine Clearance: 74 mL/min (A) (based on SCr of 1.52 mg/dL (H)).    Lab Results   Component Value Date/Time    VANCORANDOM 20.7 03/10/2024 06:16 AM       MRSA Nasal Swab: N/A. Non-respiratory infection    Assessment:  Date:  Dose/Freq Admin Times Level/Time:   3/8/24 2500 mg x1 2211     3/9/24 1500 mg x 1 2224 21.5 @ 0559   3/10/24   20.7 @ 0616     Note: Serum concentrations collected for AUC dosing may appear elevated if collected in close proximity to the dose administered, this is not necessarily an indication of toxicity    Plan:  Changing vancomycin dosing from intermittent to fixed dosing based on improvement in renal function   Dosing recommendations based on Bayesian software  Start vancomycin 1500 mg q24h  Anticipated AUC of 463 and trough concentration of 13.2 at steady state  Renal labs as indicated   Vancomycin concentrations will be ordered as clinically appropriate   Pharmacy will continue to monitor patient and adjust therapy as indicated    Thank you for the consult,  Ruthy Lino [facility]

## 2024-03-10 NOTE — CONSENT
Informed Consent for Blood Component Transfusion Note    I have discussed with the patient the rationale for blood component transfusion; its benefits in treating or preventing fatigue, organ damage, or death; and its risk which includes mild transfusion reactions, rare risk of blood borne infection, or more serious but rare reactions. I have discussed the alternatives to transfusion, including the risk and consequences of not receiving transfusion. The patient had an opportunity to ask questions and had agreed to proceed with transfusion of blood components.    Pt will get a type and screen today and if his Hgb drops to 7 or less, he consents to a blood transfusion. Hgb today 7.3.    Electronically signed by EMILY Puente CNP on 3/10/24 at 10:27 AM EDT

## 2024-03-10 NOTE — PROGRESS NOTES
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 Pain well-controlled. Ready to go home.     Review of Systems: Musculoskeletal and general review of systems are unchanged    Objective:       Vital Signs:    Blood pressure (!) 100/52, pulse 58, temperature 97.5 °F (36.4 °C), temperature source Oral, resp. rate 17, height 1.88 m (6' 2.02\"), weight (!) 140.2 kg (309 lb), SpO2 94 %.  Temp (24hrs), Av.7 °F (36.5 °C), Min:97.5 °F (36.4 °C), Max:97.9 °F (36.6 °C)      Pain Control:        Meds:  Current Facility-Administered Medications   Medication Dose Route Frequency    magnesium sulfate 2000 mg in 50 mL IVPB premix  2,000 mg IntraVENous Once    losartan (COZAAR) tablet 25 mg  25 mg Oral Daily    piperacillin-tazobactam (ZOSYN) 3,375 mg in sodium chloride 0.9 % 50 mL IVPB (mini-bag)  3,375 mg IntraVENous Q8H    lactated ringers IV soln infusion   IntraVENous Continuous    amLODIPine (NORVASC) tablet 10 mg  10 mg Oral Daily    atorvastatin (LIPITOR) tablet 20 mg  20 mg Oral Daily    sodium chloride flush 0.9 % injection 5-40 mL  5-40 mL IntraVENous 2 times per day    sodium chloride flush 0.9 % injection 5-40 mL  5-40 mL IntraVENous PRN    0.9 % sodium chloride infusion   IntraVENous PRN    acetaminophen (TYLENOL) tablet 1,000 mg  1,000 mg Oral Q6H    oxyCODONE (ROXICODONE) immediate release tablet 10 mg  10 mg Oral Q4H PRN    HYDROmorphone HCl PF (DILAUDID) injection 1 mg  1 mg IntraVENous Q3H PRN    ondansetron (ZOFRAN-ODT) disintegrating tablet 4 mg  4 mg Oral Q8H PRN    Or    ondansetron (ZOFRAN) injection 4 mg  4 mg IntraVENous Q6H PRN    sennosides-docusate sodium (SENOKOT-S) 8.6-50 MG tablet 1 tablet  1 tablet Oral BID    aspirin EC tablet 81 mg

## 2024-03-10 NOTE — PROGRESS NOTES
[facility]   Progress Note    Patient: Gilbert Yo MRN: [id number]  SSN: [id number]    YOB: 1960  Age: 63 y.o.  Sex: male      Admit Date: 3/8/2024    LOS: 2 days     Subjective:   lt KNEE TOTAL ARTHROPLASTY ROBOTIC - Left  2/9/2024  26    No complaints, doing well. Pain controlled and slept Ok    Objective:     Vitals:    03/09/24 2006 03/09/24 2122 03/10/24 0622 03/10/24 0734   BP: (!) 135/52   (!) 100/52   Pulse: 80   58   Resp: 18 18 18 17   Temp: 97.9 °F (36.6 °C)   97.5 °F (36.4 °C)   TempSrc: Oral   Oral   SpO2: 96%   94%   Weight:       Height:            Intake and Output:  Current Shift: No intake/output data recorded.  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 *      Plan:     D/c home    Signed By: Drew Ron MD     March 10, 2024

## 2024-03-10 NOTE — PROGRESS NOTES
mm/hr   Magnesium    Collection Time: 03/10/24  6:16 AM   Result Value Ref Range    Magnesium 1.5 (L) 1.8 - 2.4 mg/dL       I have personally reviewed imaging studies:  Other Studies:  XR KNEE LEFT (1-2 VIEWS)   Final Result   Intact left knee arthroplasty.             Current Meds:  Current Facility-Administered Medications   Medication Dose Route Frequency    magnesium sulfate 2000 mg in 50 mL IVPB premix  2,000 mg IntraVENous Once    piperacillin-tazobactam (ZOSYN) 3,375 mg in sodium chloride 0.9 % 50 mL IVPB (mini-bag)  3,375 mg IntraVENous Q8H    lactated ringers IV soln infusion   IntraVENous Continuous    amLODIPine (NORVASC) tablet 10 mg  10 mg Oral Daily    losartan (COZAAR) tablet 50 mg  50 mg Oral Daily    atorvastatin (LIPITOR) tablet 20 mg  20 mg Oral Daily    sodium chloride flush 0.9 % injection 5-40 mL  5-40 mL IntraVENous 2 times per day    sodium chloride flush 0.9 % injection 5-40 mL  5-40 mL IntraVENous PRN    0.9 % sodium chloride infusion   IntraVENous PRN    acetaminophen (TYLENOL) tablet 1,000 mg  1,000 mg Oral Q6H    oxyCODONE (ROXICODONE) immediate release tablet 10 mg  10 mg Oral Q4H PRN    HYDROmorphone HCl PF (DILAUDID) injection 1 mg  1 mg IntraVENous Q3H PRN    ondansetron (ZOFRAN-ODT) disintegrating tablet 4 mg  4 mg Oral Q8H PRN    Or    ondansetron (ZOFRAN) injection 4 mg  4 mg IntraVENous Q6H PRN    sennosides-docusate sodium (SENOKOT-S) 8.6-50 MG tablet 1 tablet  1 tablet Oral BID    aspirin EC tablet 81 mg  81 mg Oral BID    diphenhydrAMINE (BENADRYL) capsule 25 mg  25 mg Oral Q6H PRN    Or    diphenhydrAMINE (BENADRYL) injection 25 mg  25 mg IntraVENous Q6H PRN    ALPRAZolam (XANAX) tablet 1 mg  1 mg Oral BID    buPROPion (WELLBUTRIN SR) extended release tablet 100 mg  100 mg Oral Daily    busPIRone (BUSPAR) tablet 5 mg  5 mg Oral Nightly PRN    escitalopram (LEXAPRO) tablet 20 mg  20 mg Oral Daily    gabapentin (NEURONTIN) capsule 300 mg  300 mg Oral Daily    methocarbamol  (ROBAXIN) tablet 750 mg  750 mg Oral TID PRN    cariprazine hcl (VRAYLAR) capsule 1.5 mg  (Patient Supplied)  1.5 mg Oral Daily    zolpidem (AMBIEN) tablet 5 mg  5 mg Oral Nightly PRN    vancomycin (VANCOCIN) intermittent dosing (placeholder)   Other RX Placeholder       Signed:  Jacy Augustin, APRN - CNP    Part of this note may have been written by using a voice dictation software.  The note has been proof read but may still contain some grammatical/other typographical errors.

## 2024-03-10 NOTE — PLAN OF CARE
Problem: Pain  Goal: Verbalizes/displays adequate comfort level or baseline comfort level  Outcome: Progressing     Problem: Safety - Adult  Goal: Free from fall injury  Outcome: Progressing     Problem: Discharge Planning  Goal: Discharge to home or other facility with appropriate resources  Outcome: Progressing  Flowsheets (Taken 3/9/2024 2006)  Discharge to home or other facility with appropriate resources:   Identify barriers to discharge with patient and caregiver   Identify discharge learning needs (meds, wound care, etc)

## 2024-03-11 LAB
ANION GAP SERPL CALC-SCNC: 5 MMOL/L (ref 2–11)
BASOPHILS # BLD: 0 K/UL (ref 0–0.2)
BASOPHILS NFR BLD: 1 % (ref 0–2)
BUN SERPL-MCNC: 27 MG/DL (ref 8–23)
CALCIUM SERPL-MCNC: 9.1 MG/DL (ref 8.3–10.4)
CHLORIDE SERPL-SCNC: 108 MMOL/L (ref 103–113)
CO2 SERPL-SCNC: 28 MMOL/L (ref 21–32)
CREAT SERPL-MCNC: 1.36 MG/DL (ref 0.8–1.5)
CRP SERPL-MCNC: 2.8 MG/DL (ref 0–0.9)
DIFFERENTIAL METHOD BLD: ABNORMAL
EOSINOPHIL # BLD: 0.4 K/UL (ref 0–0.8)
EOSINOPHIL NFR BLD: 5 % (ref 0.5–7.8)
ERYTHROCYTE [DISTWIDTH] IN BLOOD BY AUTOMATED COUNT: 12.9 % (ref 11.9–14.6)
ERYTHROCYTE [SEDIMENTATION RATE] IN BLOOD: 29 MM/HR (ref 0–15)
GLUCOSE SERPL-MCNC: 96 MG/DL (ref 65–100)
HCT VFR BLD AUTO: 22.4 % (ref 41.1–50.3)
HGB BLD-MCNC: 7.3 G/DL (ref 13.6–17.2)
IMM GRANULOCYTES # BLD AUTO: 0.1 K/UL (ref 0–0.5)
IMM GRANULOCYTES NFR BLD AUTO: 1 % (ref 0–5)
LYMPHOCYTES # BLD: 2.3 K/UL (ref 0.5–4.6)
LYMPHOCYTES NFR BLD: 28 % (ref 13–44)
MCH RBC QN AUTO: 30.4 PG (ref 26.1–32.9)
MCHC RBC AUTO-ENTMCNC: 32.6 G/DL (ref 31.4–35)
MCV RBC AUTO: 93.3 FL (ref 82–102)
MONOCYTES # BLD: 0.9 K/UL (ref 0.1–1.3)
MONOCYTES NFR BLD: 11 % (ref 4–12)
NEUTS SEG # BLD: 4.4 K/UL (ref 1.7–8.2)
NEUTS SEG NFR BLD: 54 % (ref 43–78)
NRBC # BLD: 0 K/UL (ref 0–0.2)
PLATELET # BLD AUTO: 220 K/UL (ref 150–450)
PMV BLD AUTO: 11 FL (ref 9.4–12.3)
POTASSIUM SERPL-SCNC: 4.3 MMOL/L (ref 3.5–5.1)
RBC # BLD AUTO: 2.4 M/UL (ref 4.23–5.6)
SODIUM SERPL-SCNC: 141 MMOL/L (ref 136–146)
WBC # BLD AUTO: 8.1 K/UL (ref 4.3–11.1)

## 2024-03-11 PROCEDURE — 2580000003 HC RX 258: Performed by: INTERNAL MEDICINE

## 2024-03-11 PROCEDURE — 85025 COMPLETE CBC W/AUTO DIFF WBC: CPT

## 2024-03-11 PROCEDURE — 97605 NEG PRS WND THER DME<=50SQCM: CPT

## 2024-03-11 PROCEDURE — 36573 INSJ PICC RS&I 5 YR+: CPT

## 2024-03-11 PROCEDURE — C1751 CATH, INF, PER/CENT/MIDLINE: HCPCS

## 2024-03-11 PROCEDURE — 6370000000 HC RX 637 (ALT 250 FOR IP): Performed by: PHYSICIAN ASSISTANT

## 2024-03-11 PROCEDURE — 6360000002 HC RX W HCPCS: Performed by: INTERNAL MEDICINE

## 2024-03-11 PROCEDURE — 2580000003 HC RX 258: Performed by: ORTHOPAEDIC SURGERY

## 2024-03-11 PROCEDURE — 85652 RBC SED RATE AUTOMATED: CPT

## 2024-03-11 PROCEDURE — 97530 THERAPEUTIC ACTIVITIES: CPT

## 2024-03-11 PROCEDURE — 86140 C-REACTIVE PROTEIN: CPT

## 2024-03-11 PROCEDURE — 6370000000 HC RX 637 (ALT 250 FOR IP): Performed by: NURSE PRACTITIONER

## 2024-03-11 PROCEDURE — 36415 COLL VENOUS BLD VENIPUNCTURE: CPT

## 2024-03-11 PROCEDURE — 80048 BASIC METABOLIC PNL TOTAL CA: CPT

## 2024-03-11 PROCEDURE — 2580000003 HC RX 258: Performed by: PHYSICIAN ASSISTANT

## 2024-03-11 PROCEDURE — 6360000002 HC RX W HCPCS: Performed by: ORTHOPAEDIC SURGERY

## 2024-03-11 PROCEDURE — 6370000000 HC RX 637 (ALT 250 FOR IP): Performed by: INTERNAL MEDICINE

## 2024-03-11 PROCEDURE — 2580000003 HC RX 258: Performed by: NURSE PRACTITIONER

## 2024-03-11 PROCEDURE — 1100000000 HC RM PRIVATE

## 2024-03-11 PROCEDURE — 6360000002 HC RX W HCPCS: Performed by: PHYSICIAN ASSISTANT

## 2024-03-11 RX ORDER — ASPIRIN 81 MG/1
81 TABLET ORAL 2 TIMES DAILY
Qty: 70 TABLET | Refills: 0 | Status: SHIPPED | OUTPATIENT
Start: 2024-03-11 | End: 2024-04-15

## 2024-03-11 RX ORDER — SODIUM CHLORIDE 0.9 % (FLUSH) 0.9 %
5-40 SYRINGE (ML) INJECTION EVERY 12 HOURS SCHEDULED
Status: DISCONTINUED | OUTPATIENT
Start: 2024-03-11 | End: 2024-03-12 | Stop reason: HOSPADM

## 2024-03-11 RX ORDER — SODIUM CHLORIDE 9 MG/ML
25 INJECTION, SOLUTION INTRAVENOUS PRN
Status: DISCONTINUED | OUTPATIENT
Start: 2024-03-11 | End: 2024-03-12 | Stop reason: HOSPADM

## 2024-03-11 RX ORDER — LIDOCAINE HYDROCHLORIDE 10 MG/ML
5 INJECTION, SOLUTION EPIDURAL; INFILTRATION; INTRACAUDAL; PERINEURAL ONCE
Status: DISCONTINUED | OUTPATIENT
Start: 2024-03-11 | End: 2024-03-11

## 2024-03-11 RX ORDER — OXYCODONE HYDROCHLORIDE 5 MG/1
5-10 TABLET ORAL
Qty: 60 TABLET | Refills: 0 | Status: SHIPPED | OUTPATIENT
Start: 2024-03-11 | End: 2024-03-16

## 2024-03-11 RX ORDER — SODIUM CHLORIDE 0.9 % (FLUSH) 0.9 %
5-40 SYRINGE (ML) INJECTION PRN
Status: DISCONTINUED | OUTPATIENT
Start: 2024-03-11 | End: 2024-03-12 | Stop reason: HOSPADM

## 2024-03-11 RX ADMIN — Medication 2000 MG: at 04:19

## 2024-03-11 RX ADMIN — ALPRAZOLAM 1 MG: 0.5 TABLET ORAL at 20:50

## 2024-03-11 RX ADMIN — SODIUM CHLORIDE, PRESERVATIVE FREE 10 ML: 5 INJECTION INTRAVENOUS at 20:50

## 2024-03-11 RX ADMIN — VANCOMYCIN HYDROCHLORIDE 1500 MG: 10 INJECTION, POWDER, LYOPHILIZED, FOR SOLUTION INTRAVENOUS at 23:11

## 2024-03-11 RX ADMIN — SODIUM CHLORIDE, POTASSIUM CHLORIDE, SODIUM LACTATE AND CALCIUM CHLORIDE: 600; 310; 30; 20 INJECTION, SOLUTION INTRAVENOUS at 08:31

## 2024-03-11 RX ADMIN — ESCITALOPRAM OXALATE 20 MG: 10 TABLET ORAL at 08:24

## 2024-03-11 RX ADMIN — ACETAMINOPHEN 1000 MG: 500 TABLET, FILM COATED ORAL at 23:10

## 2024-03-11 RX ADMIN — ACETAMINOPHEN 1000 MG: 500 TABLET, FILM COATED ORAL at 04:21

## 2024-03-11 RX ADMIN — GABAPENTIN 300 MG: 300 CAPSULE ORAL at 08:24

## 2024-03-11 RX ADMIN — ALPRAZOLAM 1 MG: 0.5 TABLET ORAL at 08:24

## 2024-03-11 RX ADMIN — HYDROMORPHONE HYDROCHLORIDE 1 MG: 1 INJECTION, SOLUTION INTRAMUSCULAR; INTRAVENOUS; SUBCUTANEOUS at 20:50

## 2024-03-11 RX ADMIN — AMLODIPINE BESYLATE 10 MG: 10 TABLET ORAL at 08:24

## 2024-03-11 RX ADMIN — SODIUM CHLORIDE, PRESERVATIVE FREE 10 ML: 5 INJECTION INTRAVENOUS at 20:51

## 2024-03-11 RX ADMIN — ACETAMINOPHEN 1000 MG: 500 TABLET, FILM COATED ORAL at 18:48

## 2024-03-11 RX ADMIN — ATORVASTATIN CALCIUM 20 MG: 10 TABLET, FILM COATED ORAL at 08:24

## 2024-03-11 RX ADMIN — BUPROPION HYDROCHLORIDE 100 MG: 100 TABLET, EXTENDED RELEASE ORAL at 08:24

## 2024-03-11 RX ADMIN — ASPIRIN 81 MG: 81 TABLET, COATED ORAL at 08:24

## 2024-03-11 RX ADMIN — ACETAMINOPHEN 1000 MG: 500 TABLET, FILM COATED ORAL at 13:21

## 2024-03-11 RX ADMIN — OXYCODONE 10 MG: 5 TABLET ORAL at 11:03

## 2024-03-11 RX ADMIN — ASPIRIN 81 MG: 81 TABLET, COATED ORAL at 20:49

## 2024-03-11 RX ADMIN — DOCUSATE SODIUM 50MG AND SENNOSIDES 8.6MG 1 TABLET: 8.6; 5 TABLET, FILM COATED ORAL at 08:24

## 2024-03-11 RX ADMIN — RIFAMPIN 300 MG: 300 CAPSULE ORAL at 20:49

## 2024-03-11 RX ADMIN — Medication 2000 MG: at 13:22

## 2024-03-11 RX ADMIN — RIFAMPIN 300 MG: 300 CAPSULE ORAL at 08:24

## 2024-03-11 RX ADMIN — OXYCODONE 10 MG: 5 TABLET ORAL at 04:17

## 2024-03-11 RX ADMIN — DOCUSATE SODIUM 50MG AND SENNOSIDES 8.6MG 1 TABLET: 8.6; 5 TABLET, FILM COATED ORAL at 20:49

## 2024-03-11 RX ADMIN — OXYCODONE 10 MG: 5 TABLET ORAL at 18:48

## 2024-03-11 RX ADMIN — LOSARTAN POTASSIUM 25 MG: 25 TABLET, FILM COATED ORAL at 08:24

## 2024-03-11 RX ADMIN — Medication 2000 MG: at 20:50

## 2024-03-11 ASSESSMENT — PAIN DESCRIPTION - DESCRIPTORS
DESCRIPTORS: ACHING
DESCRIPTORS: THROBBING

## 2024-03-11 ASSESSMENT — PAIN DESCRIPTION - LOCATION
LOCATION: KNEE

## 2024-03-11 ASSESSMENT — PAIN DESCRIPTION - ORIENTATION
ORIENTATION: LEFT

## 2024-03-11 ASSESSMENT — PAIN SCALES - GENERAL
PAINLEVEL_OUTOF10: 1
PAINLEVEL_OUTOF10: 7
PAINLEVEL_OUTOF10: 4

## 2024-03-11 NOTE — PROGRESS NOTES
ACUTE PHYSICAL THERAPY GOALS:   (Developed with and agreed upon by patient and/or caregiver.)  GOALS (1-4 days):  (1.)Mr. Yo will move from supine to sit and sit to supine  in bed with STAND BY ASSIST. Met 3/10  (2.)Mr. Yo will transfer from bed to chair and chair to bed with STAND BY ASSIST using the least restrictive device. Met 3/10  (3.)Mr. Yo will ambulate with STAND BY ASSIST for 250 feet with the least restrictive device.Met 3/10  (4.)Mr. Yo will ambulate up/down 3 steps with L railing with CONTACT GUARD ASSIST.Met 3/10    NEW GOALS 3/10/24 :  1) bed mobility with supervision.  2) transfers with walker & KI with supervision  3) pt ambulating 500 ft with rolling walker & KI with supervision.  4) pt able to go up & down 3 steps with left rail & SBA. Met 3/10    ________________________________________________________________________________________________           PHYSICAL THERAPY: TOTAL KNEE ARTHROPLASTY-revision Daily Note and AM  (Link to Caseload Tracking: PT Visit Days : 2  KNEE IMMOBILIZER AT ALL TIMES  Acknowledge Orders  Time In/Out  PT Charge Capture  Rehab Caseload Tracker  Episode   Gilbert Yo is a 63 y.o. 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]  Hx of total knee arthroplasty, left [Z96.652]  Open wound of left knee, initial encounter [S81.002A]  Procedure(s) (LRB):  KNEE TOTAL ARTHROPLASTY REVISION (Left)  3 Days Post-Op  Reason for Referral: Pain in Left Knee (M25.562)  Stiffness of Left Knee, Not elsewhere classified (M25.662)  Difficulty in walking, Not elsewhere classified (R26.2)  Other abnormalities of gait and mobility (R26.89)  Inpatient: Payor: MEDICARE / Plan: MEDICARE PART A / Product Type: *No Product type* /     REHAB RECOMMENDATIONS:   Recommendation to date pending progress:  Setting:  Home Health Therapy    Equipment:    None       GAIT: I Mod I S SBA CGA Min Mod Max  level  Home Access: Stairs to enter with rails  Entrance Stairs - Number of Steps: 3  Entrance Stairs - Rails: Left  Bathroom Shower/Tub: Walk-in shower  Home Equipment: Walker, rolling    OBJECTIVE:     PAIN: VITAL SIGNS: LINES/DRAINS:   Pre Treatment:0         Post Treatment: 0 Vitals NT       Oxygen NT RA    IV and Wound Vac    RESTRICTIONS/PRECAUTIONS:  Restrictions/Precautions: Weight Bearing, Fall Risk  Left Lower Extremity Weight Bearing: Weight Bearing As Tolerated (with KI on)     Knee immobilizer to be worn at all times except during showering.                 LOWER EXTREMITY GROSS EVALUATION:   RIGHT LE   Within Functional Limits   Abnormal   Comments   Strength [x] []     Range of Motion [x] []        LEFT LE Within Functional Limits   Abnormal   Comments   Strength [] [x]   NT , Decreased but functional in KI   Range of Motion [] [x]  KNEE IMMOBILIZER; ankle and hip WFL     UPPER EXTREMITY GROSS EVALUATION:     Within Functional Limits   Abnormal   Comments   Strength [x] []    Range of Motion [x] []      SENSATION  Sensation [x]  grossly     COGNITION/  PERCEPTION: Intact Impaired (Comments):   Orientation [x]     Vision [x]     Hearing [x]     Cognition  [x]       MOBILITY: I Mod I S SBA CGA Min Mod Max Total  NT x2 Comments:   Bed Mobility    Rolling [] [] [] [] [] [] [] [] [] [x] []    Supine to Sit [] [] [] [] [] [] [] [] [] [x] []    Scooting [] [] [] [] [] [] [] [] [] [x] []    Sit to Supine [] [] [] [] [] [] [] [] [] [x] []    Transfers    Sit to Stand [] [] [] [x] [] [] [] [] [] [] []    Bed to Chair [] [] [] [x] [] [] [] [] [] [] [] With walker   Stand to Sit [] [] [] [x] [] [] [] [] [] [] []    Stand Pivot [] [] [] [x] [] [] [] [] [] [] []    Toilet [] [] [] [] [] [] [] [] [] [] []     [] [] [] [] [] [] [] [] [] [] []    I=Independent, Mod I=Modified Independent, S=Supervision, SBA=Standby Assistance, CGA=Contact Guard Assistance,   Min=Minimal Assistance, Mod=Moderate Assistance, Max=Maximal

## 2024-03-11 NOTE — PROGRESS NOTES
ACUTE PHYSICAL THERAPY GOALS:   (Developed with and agreed upon by patient and/or caregiver.)  GOALS (1-4 days):  (1.)Mr. Yo will move from supine to sit and sit to supine  in bed with STAND BY ASSIST. Met 3/10  (2.)Mr. Yo will transfer from bed to chair and chair to bed with STAND BY ASSIST using the least restrictive device. Met 3/10  (3.)Mr. Yo will ambulate with STAND BY ASSIST for 250 feet with the least restrictive device.Met 3/10  (4.)Mr. Yo will ambulate up/down 3 steps with L railing with CONTACT GUARD ASSIST.Met 3/10    NEW GOALS 3/10/24 :  1) bed mobility with supervision.  2) transfers with walker & KI with supervision  3) pt ambulating 500 ft with rolling walker & KI with supervision.  4) pt able to go up & down 3 steps with left rail & SBA. Met 3/10    ________________________________________________________________________________________________           PHYSICAL THERAPY: TOTAL KNEE ARTHROPLASTY-revision Daily Note and PM  (Link to Caseload Tracking: PT Visit Days : 2  KNEE IMMOBILIZER AT ALL TIMES  Acknowledge Orders  Time In/Out  PT Charge Capture  Rehab Caseload Tracker  Episode   Gilbert Yo is a 63 y.o. 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]  Hx of total knee arthroplasty, left [Z96.652]  Open wound of left knee, initial encounter [S81.002A]  Procedure(s) (LRB):  KNEE TOTAL ARTHROPLASTY REVISION (Left)  3 Days Post-Op  Reason for Referral: Pain in Left Knee (M25.562)  Stiffness of Left Knee, Not elsewhere classified (M25.662)  Difficulty in walking, Not elsewhere classified (R26.2)  Other abnormalities of gait and mobility (R26.89)  Inpatient: Payor: MEDICARE / Plan: MEDICARE PART A / Product Type: *No Product type* /     REHAB RECOMMENDATIONS:   Recommendation to date pending progress:  Setting:  Home Health Therapy    Equipment:    None       GAIT: I Mod I S SBA CGA Min Mod Max  AA 15 AA 20 20 20 aa 20 aa   Knee Slides ------- -----       Short Arc Quads -------- ------       Chair Slides -------- ------                         B = bilateral; AA = active assistive; A = active; P = passive      EDUCATION:  [x] Home Exercises  [x] Fall Precautions  [x] No Pillow Under Knee  [] D/C Instruction Review [x] Cryocuff  [x] Walker Management/Safety  [] Adaptive Equipment as Needed     Interdisciplinary Patient Education   (Reference education tab)    AFTER TREATMENT PRECAUTIONS: Bed/Chair Locked, Call light within reach, Chair, Needs within reach, and RN notified    INTERDISCIPLINARY COLLABORATION:  RN/ PCT    COMPLIANCE WITH PROGRAM/EXERCISE:  Will assess as treatment progresses.    RECOMMENDATIONS/INTENT FOR NEXT TREATMENT SESSION: Treatment next visit will focus on increasing Mr. Khans independence with bed mobility, transfers, gait training, strength/ROM exercises, modalities for pain, and patient education.     TIME IN/OUT:  Time In: 1300  Time Out: 1330  Minutes: 30    LORRAINE PENDLETON, PTA

## 2024-03-11 NOTE — PROGRESS NOTES
PICC Placement Note    PRE-PROCEDURE VERIFICATION    Correct Procedure: yes  Time out completed with assistant Corwin Kapadia RN and all persons present in agreement with time out.  Risks and benefits reviewed with pt  and informed consent obtained prior to assessment and procedure.     Correct Site: yes  Temperature: Temp: 97.5 °F (36.4 °C), Temperature Source:    Recent Labs     03/11/24  0336   BUN 27*      WBC 8.1     Allergies: Patient has no known allergies.  Education materials for PICC Care given to patient or family.    PROCEDURE DETAIL  A single lumen PICC line was started for Long term IV/antibiotic administration. The following documentation is in addition to the PICC properties in the lines/airways flowsheet:    Xylocaine used: Yes  Mid-Arm Circumference: 43 (cm)  Internal Catheter Length: 43 (cm)  External Catheter Length: 0 (cm)  Total Catheter Length: 43 (cm)  Vein Selection for PICC: right basilic  Central Line Insertion Bundle followed: Yes  Complication Related to Insertion: none    Both the insertion guidewire and ECG guidewire were removed intact all ports have positive blood return and were flush well with normal saline.    The location of the tip of the PICC is verified using ECG technology.  The tip is in the SVC per ECG reading.  See image below.             Line is okay to use: yes      Shira Mejia RN, [facility]

## 2024-03-11 NOTE — PROGRESS NOTES
VANCO DAILY FOLLOW UP NOTE  [facility]   Pharmacy Pharmacokinetic Monitoring Service - Vancomycin    Consulting Provider: Alberto   Indication: Prosthetic Joint Infection  Target Concentration: Goal AUC/VICTOR MANUEL 400-600 mg*hr/L  Day of Therapy: 4  Additional Antimicrobials: Cefazolin, rifampin    Pertinent Laboratory Values:   Wt Readings from Last 1 Encounters:   03/08/24 (!) 140.2 kg (309 lb)     Temp Readings from Last 1 Encounters:   03/11/24 97.5 °F (36.4 °C) (Oral)     Recent Labs     03/09/24  0559 03/09/24  0812 03/10/24  0616 03/11/24  0336   BUN  --   --  26* 27*   CREATININE 1.62*  --  1.52* 1.36   WBC  --  8.5 7.6 8.1     Estimated Creatinine Clearance: 83 mL/min (based on SCr of 1.36 mg/dL).    Lab Results   Component Value Date/Time    VANCORANDOM 20.7 03/10/2024 06:16 AM       MRSA Nasal Swab: N/A. Non-respiratory infection    Assessment:  Date/Time Dose Concentration AUC         Note: Serum concentrations collected for AUC dosing may appear elevated if collected in close proximity to the dose administered, this is not necessarily an indication of toxicity    Plan:  Current dosing regimen is therapeutic  Continue current dose  Repeat vancomycin concentrations will be ordered as clinically appropriate   Pharmacy will continue to monitor patient and adjust therapy as indicated    Thank you for the consult,  Ben Flood, MichaD, BCPS

## 2024-03-11 NOTE — PROGRESS NOTES
Glom Filt Rate 58 (L) >60 ml/min/1.73m2    Calcium 9.1 8.3 - 10.4 MG/DL   C-Reactive Protein    Collection Time: 03/11/24  3:36 AM   Result Value Ref Range    CRP 2.8 (H) 0.0 - 0.9 mg/dL   Sedimentation Rate    Collection Time: 03/11/24  3:36 AM   Result Value Ref Range    Sed Rate, Automated 29 (H) 0 - 15 mm/hr   CBC with Auto Differential    Collection Time: 03/11/24  3:36 AM   Result Value Ref Range    WBC 8.1 4.3 - 11.1 K/uL    RBC 2.40 (L) 4.23 - 5.6 M/uL    Hemoglobin 7.3 (L) 13.6 - 17.2 g/dL    Hematocrit 22.4 (L) 41.1 - 50.3 %    MCV 93.3 82.0 - 102.0 FL    MCH 30.4 26.1 - 32.9 PG    MCHC 32.6 31.4 - 35.0 g/dL    RDW 12.9 11.9 - 14.6 %    Platelets 220 150 - 450 K/uL    MPV 11.0 9.4 - 12.3 FL    nRBC 0.00 0.0 - 0.2 K/uL    Differential Type AUTOMATED      Neutrophils % 54 43 - 78 %    Lymphocytes % 28 13 - 44 %    Monocytes % 11 4.0 - 12.0 %    Eosinophils % 5 0.5 - 7.8 %    Basophils % 1 0.0 - 2.0 %    Immature Granulocytes 1 0.0 - 5.0 %    Neutrophils Absolute 4.4 1.7 - 8.2 K/UL    Lymphocytes Absolute 2.3 0.5 - 4.6 K/UL    Monocytes Absolute 0.9 0.1 - 1.3 K/UL    Eosinophils Absolute 0.4 0.0 - 0.8 K/UL    Basophils Absolute 0.0 0.0 - 0.2 K/UL    Absolute Immature Granulocyte 0.1 0.0 - 0.5 K/UL       I have personally reviewed imaging studies:  Other Studies:  XR KNEE LEFT (1-2 VIEWS)   Final Result   Intact left knee arthroplasty.             Current Meds:  Current Facility-Administered Medications   Medication Dose Route Frequency    sodium chloride flush 0.9 % injection 5-40 mL  5-40 mL IntraVENous 2 times per day    sodium chloride flush 0.9 % injection 5-40 mL  5-40 mL IntraVENous PRN    0.9 % sodium chloride infusion  25 mL IntraVENous PRN    losartan (COZAAR) tablet 25 mg  25 mg Oral Daily    vancomycin (VANCOCIN) 1500 mg in sodium chloride 0.9 % 250 mL IVPB  1,500 mg IntraVENous Q24H    ceFAZolin (ANCEF) 2000 mg in sterile water 20 mL IV syringe  2,000 mg IntraVENous Q8H    rifAMPin (RIFADIN)  capsule 300 mg  300 mg Oral 2 times per day    lactated ringers IV soln infusion   IntraVENous Continuous    amLODIPine (NORVASC) tablet 10 mg  10 mg Oral Daily    atorvastatin (LIPITOR) tablet 20 mg  20 mg Oral Daily    sodium chloride flush 0.9 % injection 5-40 mL  5-40 mL IntraVENous 2 times per day    sodium chloride flush 0.9 % injection 5-40 mL  5-40 mL IntraVENous PRN    0.9 % sodium chloride infusion   IntraVENous PRN    acetaminophen (TYLENOL) tablet 1,000 mg  1,000 mg Oral Q6H    oxyCODONE (ROXICODONE) immediate release tablet 10 mg  10 mg Oral Q4H PRN    HYDROmorphone HCl PF (DILAUDID) injection 1 mg  1 mg IntraVENous Q3H PRN    ondansetron (ZOFRAN-ODT) disintegrating tablet 4 mg  4 mg Oral Q8H PRN    Or    ondansetron (ZOFRAN) injection 4 mg  4 mg IntraVENous Q6H PRN    sennosides-docusate sodium (SENOKOT-S) 8.6-50 MG tablet 1 tablet  1 tablet Oral BID    aspirin EC tablet 81 mg  81 mg Oral BID    diphenhydrAMINE (BENADRYL) capsule 25 mg  25 mg Oral Q6H PRN    Or    diphenhydrAMINE (BENADRYL) injection 25 mg  25 mg IntraVENous Q6H PRN    ALPRAZolam (XANAX) tablet 1 mg  1 mg Oral BID    buPROPion (WELLBUTRIN SR) extended release tablet 100 mg  100 mg Oral Daily    busPIRone (BUSPAR) tablet 5 mg  5 mg Oral Nightly PRN    escitalopram (LEXAPRO) tablet 20 mg  20 mg Oral Daily    gabapentin (NEURONTIN) capsule 300 mg  300 mg Oral Daily    methocarbamol (ROBAXIN) tablet 750 mg  750 mg Oral TID PRN    cariprazine hcl (VRAYLAR) capsule 1.5 mg  (Patient Supplied)  1.5 mg Oral Daily    zolpidem (AMBIEN) tablet 5 mg  5 mg Oral Nightly PRN       Signed:  EMILY Puente - CNP    Part of this note may have been written by using a voice dictation software.  The note has been proof read but may still contain some grammatical/other typographical errors.

## 2024-03-11 NOTE — RT PROTOCOL NOTE
airways.    Vibratory / Acoustical Airway Clearance Therapy (ACT)- i.e. (Vibralung, Vest, or Percussor)  Indications should include  Patient conditions  that involve retained secretions, increased mucus production and defective mucociliary clearance such as:  Cystic fibrosis  Chronic bronchitis  Bronchiectasis  Pneumonia  Asthma  Muscular dystrophy  Post-operative atelectasis  Neuromuscular respiratory impairments  ACT may be considered in patients with COPD with  symptomatic secretion retention, guided by patient preference,  toleration, and effectiveness of therapy (Abdiaziz et al., 2013).  Nasotracheal suctioning indications should include:  Inability to cough effectively  Excessive secretions  Artificial airway      V. Equipment:   A. PEP therapy device   B. Vest therapy equipment   C. Vibralung   D. AccuPap   E. Percussor  F. NT suction equipment       VI. Guidelines:   Monitor patient's vital signs and evaluate patient's clinical status.  The need to                                change therapy modality may be indicated by:  Change in patient's sensorium (patient now confused or obtunded, and unable to follow directions).   A significant deterioration is evident on patient's chest radiograph or increased sputum production.  Increased thickening of secretions (e.g. mucolytic therapy may be indicated.)  Development of wheezing  Decrease in oxygen saturation  Development of chest pain.    VII.   Clinical Responsibility: The Therapy Assessment Protocol guidelines will be used to                re-evaluate all patients on bronchial hygiene therapy (See Therapy Assessment Protocol).  RCP's will perform changes in therapy according to protocol..  Bronchial hygiene therapy may be discontinued when goals of therapy are met, e.g., secretions easily expectorated for 48 hours, atelectasis is resolved, etc.  PAP Therapy may be utilized in place of IPPB therapy per discretion of the RCP, as approved by the Pulmonary  second breath hold.  Patient may be changed to self-administer as appropriate.   Patients in isolation will be provided an individual inhaler.  Unassisted aerosol (UA) is indicated if  Ventilation is inadequate  Patient is unable to perform MDI appropriately     VII. Guidelines:   Monitor patient's vital signs and evaluate patient's clinical status. The need to change medication and/or modality may be indicated by:  A pulse greater than 120 bpm, or if a pulse increase of 20 bpm occurs with bronchodilator medications.  Significant worsening of dyspnea or wheezing occurring during or within 30 minutes of discontinuing therapy.  Worsening of patient's sensorium (e.g. patient becomes confused or obtunded, and unable to follow directions).  Worsening of patient's chest x-ray.  Change in sputum (e.g. increased pulmonary infiltrate, which might indicate need for volume expansion therapy).  Patient has difficulty coughing up secretions, which might indicate need for acetylcysteine and/or bronchial hygiene therapy.  Call physician immediately if dyspnea worsens and is not responsive to modifications allowed by protocol.    VIII. Clinical Responsibility:  The therapy assessment guidelines will be used to evaluate all patients receiving aerosolized medications with the exception of critical care areas.    RCP's will perform changes in therapy per protocol.   It will be the responsibility of RCP to provide instruction regarding respiratory medications, possible side effects, aerosol therapy and proper MDI technique, as well as, spacer usage to patients ordered MDI therapy.   Current therapy that is part of a patient's home regimen will not be discontinued.  Provide spacer and educate patient on proper inhaler technique if needed.    IX. Documentation  Document assessment findings in the respiratory assessment section of the patient's EMR.  Document changes in therapy per protocol in the respiratory orders section and in the

## 2024-03-11 NOTE — CARE COORDINATION
CM following to assist with dc plans.  ID anticipating long term IVAB- final cultures pending.  Pt interested in home infusion and did not have preferences towards provider. Referral sent to [facility] and [facility].  Pt has good coverage through his [employer] plan. Raymond with [facility] to initiate home training once drug determined. Pt has his PICC line.  Patient will also need a [employer] wound vac- we are waiting on wd consult (order only written today/Mon) today as we need measurements to initiate [employer] referral form- (placed on CM desk) will fax referral once form completed.

## 2024-03-11 NOTE — PROGRESS NOTES
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 Pain well-controlled. Ready to go home.     Review of Systems: Musculoskeletal and general review of systems are unchanged    Objective:       Vital Signs:    Blood pressure 115/86, pulse 91, temperature 97.5 °F (36.4 °C), temperature source Oral, resp. rate 16, height 1.88 m (6' 2.02\"), weight (!) 140.2 kg (309 lb), SpO2 96 %.  Temp (24hrs), Av.4 °F (36.3 °C), Min:97.3 °F (36.3 °C), Max:97.5 °F (36.4 °C)      Pain Control:        Meds:  Current Facility-Administered Medications   Medication Dose Route Frequency    losartan (COZAAR) tablet 25 mg  25 mg Oral Daily    vancomycin (VANCOCIN) 1500 mg in sodium chloride 0.9 % 250 mL IVPB  1,500 mg IntraVENous Q24H    ceFAZolin (ANCEF) 2000 mg in sterile water 20 mL IV syringe  2,000 mg IntraVENous Q8H    rifAMPin (RIFADIN) capsule 300 mg  300 mg Oral 2 times per day    lactated ringers IV soln infusion   IntraVENous Continuous    amLODIPine (NORVASC) tablet 10 mg  10 mg Oral Daily    atorvastatin (LIPITOR) tablet 20 mg  20 mg Oral Daily    sodium chloride flush 0.9 % injection 5-40 mL  5-40 mL IntraVENous 2 times per day    sodium chloride flush 0.9 % injection 5-40 mL  5-40 mL IntraVENous PRN    0.9 % sodium chloride infusion   IntraVENous PRN    acetaminophen (TYLENOL) tablet 1,000 mg  1,000 mg Oral Q6H    oxyCODONE (ROXICODONE) immediate release tablet 10 mg  10 mg Oral Q4H PRN    HYDROmorphone HCl PF (DILAUDID) injection 1 mg  1 mg IntraVENous Q3H PRN    ondansetron (ZOFRAN-ODT) disintegrating tablet 4 mg  4 mg Oral Q8H PRN    Or    ondansetron (ZOFRAN) injection 4 mg  4 mg IntraVENous Q6H PRN    sennosides-docusate sodium (SENOKOT-S) 8.6-50 MG tablet 1

## 2024-03-11 NOTE — PROGRESS NOTES
Infectious Disease Consult    Today's Date: 3/11/2024   Admit Date: 3/8/2024  1960  Chief Complaint:    Impression:   S aureus L knee arthroplasty infection, initial surgery 2/9/24, s/p I&D with removal and replacement of hardware 3/8/24  Susceptibility pending  Recent syncopal episode  Morbid obesity    Plan:   Cont vancomycin and cefazolin plus rifampin  When susceptibility results available, then we can arrange outpatient antibiotic plan  Go ahead and place PICC    Anti-infectives:   Vancomycin (3/8 -   Zosyn (3/8-3/10)  Cefazolin (3/8-3/9) (3/10/2024 -   Bactrim (3/5 -3/7)  Rifampin (3/10/2024 - )    Subjective:   No new complaints.  Tolerating antibiotics without side effect.    HPI  Patient is a 63 y.o. male with an underlying medical history that includes hypertension, hyperlipidemia, obesity, spinal stenosis and L knee osteoarthritis with L patellar tendon rupture and repair. On 2/9/24 he underwent robotic-assisted total knee arthroplasty. He was seen in the ED 2/19 with olfactory hallucinations, thought to be related to benzodiazepine withdrawal, and then represented to the ED 2/25/24 after an episode of syncope, elevated D-dimer and troponin and ECG with noted unchanged R BBB. On 3/4/24 he had some yellow cloudy drainage from his incision, and was seen by Dr. Dominguez 3/5, wound vac placed and he was started on Bactrim - he reports he took about four doses prior to surgery.  Xray showed arthroplasty was stable. He was readmitted 3/8/24 for I&D, with findings of purulent material and the hardware was removed and replaced. Cultures are pending. Nasal MRSA swab negative 3/7/24, but previously positive 1/30/24. His creatinine on admission was elevated at 2.25 above prior (was 1.35 1/30/24). He is afebrile and has been started on antibiotics as noted above. ID has been asked for further recommendations.         No Known Allergies     Review of Systems:  Pertinent items are noted in the History of Present  Illness.    Objective:     Visit Vitals  /86   Pulse 91   Temp 97.5 °F (36.4 °C) (Oral)   Resp 16   Ht 1.88 m (6' 2.02\")   Wt (!) 140.2 kg (309 lb)   SpO2 96%   BMI 39.66 kg/m²     Temp (24hrs), Av.4 °F (36.3 °C), Min:97.3 °F (36.3 °C), Max:97.5 °F (36.4 °C)       Lines:  Peripheral IV:       Physical Exam:    General:  Alert, cooperative, obese, well developed, appears stated age; sitting up in chair   Eyes:  Sclera anicteric, EOMI   Mouth/Throat: Mucous membranes normal, oral pharynx clear   Neck: Supple   Lungs:   Clear to auscultation bilaterally, good effort   CV:  Regular rate and rhythm, loud murmur    Abdomen:   Soft, non-tender, active bowel sounds   Extremities: LLE wrapped with wound vac and in immobilizer    Skin: Skin color, texture, turgor normal, no acute rash or lesions   Musculoskeletal: No swelling or deformity   Lines/Devices:  Intact, no erythema, drainage or tenderness   Psych: Alert and oriented, appropriate mood and affect given the setting       Data Review:     CBC:  Recent Labs     24  0812 03/10/24  0616 24  0336   WBC 8.5 7.6 8.1   HGB 8.6* 7.3* 7.3*   HCT 26.3* 23.2* 22.4*    202 220         BMP:  Recent Labs     03/10/24  0616 24  0336   BUN 26* 27*    141   K 4.3 4.3    108   CO2 27 28         LFTS:  No results for input(s): \"ALT\", \"TP\", \"ALB\" in the last 72 hours.    Invalid input(s): \"TBILI\", \"SGOT\", \"AP\"    Microbiology:   Pertinent findings noted above.   Left knee: S aureus    Imaging:   Reviewed and pertinent findings noted above.    No new    Signed By: Leeroy Hdez MD     2024

## 2024-03-12 VITALS
HEIGHT: 74 IN | SYSTOLIC BLOOD PRESSURE: 148 MMHG | TEMPERATURE: 97.8 F | RESPIRATION RATE: 16 BRPM | WEIGHT: 309 LBS | HEART RATE: 102 BPM | OXYGEN SATURATION: 95 % | DIASTOLIC BLOOD PRESSURE: 65 MMHG | BODY MASS INDEX: 39.66 KG/M2

## 2024-03-12 LAB
ANION GAP SERPL CALC-SCNC: 9 MMOL/L (ref 2–11)
BACTERIA SPEC CULT: ABNORMAL
BASOPHILS # BLD: 0 K/UL (ref 0–0.2)
BASOPHILS NFR BLD: 0 % (ref 0–2)
BUN SERPL-MCNC: 23 MG/DL (ref 8–23)
CALCIUM SERPL-MCNC: 9.2 MG/DL (ref 8.3–10.4)
CHLORIDE SERPL-SCNC: 105 MMOL/L (ref 103–113)
CO2 SERPL-SCNC: 25 MMOL/L (ref 21–32)
CREAT SERPL-MCNC: 1.22 MG/DL (ref 0.8–1.5)
CRP SERPL-MCNC: 6.1 MG/DL (ref 0–0.9)
DIFFERENTIAL METHOD BLD: ABNORMAL
EOSINOPHIL # BLD: 0.4 K/UL (ref 0–0.8)
EOSINOPHIL NFR BLD: 5 % (ref 0.5–7.8)
ERYTHROCYTE [DISTWIDTH] IN BLOOD BY AUTOMATED COUNT: 13.1 % (ref 11.9–14.6)
ERYTHROCYTE [SEDIMENTATION RATE] IN BLOOD: 41 MM/HR (ref 0–15)
GLUCOSE SERPL-MCNC: 105 MG/DL (ref 65–100)
GRAM STN SPEC: ABNORMAL
HCT VFR BLD AUTO: 23.3 % (ref 41.1–50.3)
HGB BLD-MCNC: 7.6 G/DL (ref 13.6–17.2)
IMM GRANULOCYTES # BLD AUTO: 0.1 K/UL (ref 0–0.5)
IMM GRANULOCYTES NFR BLD AUTO: 1 % (ref 0–5)
LYMPHOCYTES # BLD: 2.3 K/UL (ref 0.5–4.6)
LYMPHOCYTES NFR BLD: 30 % (ref 13–44)
MCH RBC QN AUTO: 30.4 PG (ref 26.1–32.9)
MCHC RBC AUTO-ENTMCNC: 32.6 G/DL (ref 31.4–35)
MCV RBC AUTO: 93.2 FL (ref 82–102)
MONOCYTES # BLD: 1 K/UL (ref 0.1–1.3)
MONOCYTES NFR BLD: 13 % (ref 4–12)
NEUTS SEG # BLD: 3.9 K/UL (ref 1.7–8.2)
NEUTS SEG NFR BLD: 51 % (ref 43–78)
NRBC # BLD: 0 K/UL (ref 0–0.2)
PLATELET # BLD AUTO: 230 K/UL (ref 150–450)
PMV BLD AUTO: 10.3 FL (ref 9.4–12.3)
POTASSIUM SERPL-SCNC: 4 MMOL/L (ref 3.5–5.1)
RBC # BLD AUTO: 2.5 M/UL (ref 4.23–5.6)
SERVICE CMNT-IMP: ABNORMAL
SODIUM SERPL-SCNC: 139 MMOL/L (ref 136–146)
WBC # BLD AUTO: 7.5 K/UL (ref 4.3–11.1)

## 2024-03-12 PROCEDURE — 97530 THERAPEUTIC ACTIVITIES: CPT

## 2024-03-12 PROCEDURE — 36415 COLL VENOUS BLD VENIPUNCTURE: CPT

## 2024-03-12 PROCEDURE — 6370000000 HC RX 637 (ALT 250 FOR IP): Performed by: PHYSICIAN ASSISTANT

## 2024-03-12 PROCEDURE — 6360000002 HC RX W HCPCS: Performed by: INTERNAL MEDICINE

## 2024-03-12 PROCEDURE — 6370000000 HC RX 637 (ALT 250 FOR IP): Performed by: INTERNAL MEDICINE

## 2024-03-12 PROCEDURE — 2580000003 HC RX 258: Performed by: PHYSICIAN ASSISTANT

## 2024-03-12 PROCEDURE — 80048 BASIC METABOLIC PNL TOTAL CA: CPT

## 2024-03-12 PROCEDURE — 2580000003 HC RX 258: Performed by: INTERNAL MEDICINE

## 2024-03-12 PROCEDURE — 86140 C-REACTIVE PROTEIN: CPT

## 2024-03-12 PROCEDURE — 85025 COMPLETE CBC W/AUTO DIFF WBC: CPT

## 2024-03-12 PROCEDURE — 6370000000 HC RX 637 (ALT 250 FOR IP): Performed by: NURSE PRACTITIONER

## 2024-03-12 PROCEDURE — 85652 RBC SED RATE AUTOMATED: CPT

## 2024-03-12 RX ORDER — AMLODIPINE BESYLATE 10 MG/1
10 TABLET ORAL DAILY
Qty: 30 TABLET | Refills: 2 | Status: SHIPPED | OUTPATIENT
Start: 2024-03-13 | End: 2024-06-11

## 2024-03-12 RX ORDER — OXYCODONE HYDROCHLORIDE 5 MG/1
10 CAPSULE ORAL EVERY 4 HOURS PRN
Status: DISCONTINUED | OUTPATIENT
Start: 2024-03-12 | End: 2024-03-12 | Stop reason: HOSPADM

## 2024-03-12 RX ORDER — RIFAMPIN 300 MG/1
300 CAPSULE ORAL 2 TIMES DAILY
Qty: 20 CAPSULE | Refills: 0 | Status: SHIPPED | OUTPATIENT
Start: 2024-03-12 | End: 2024-03-22

## 2024-03-12 RX ORDER — LOSARTAN POTASSIUM 25 MG/1
25 TABLET ORAL DAILY
Qty: 30 TABLET | Refills: 2 | Status: SHIPPED | OUTPATIENT
Start: 2024-03-13 | End: 2024-06-11

## 2024-03-12 RX ADMIN — BUPROPION HYDROCHLORIDE 100 MG: 100 TABLET, EXTENDED RELEASE ORAL at 08:45

## 2024-03-12 RX ADMIN — SODIUM CHLORIDE, PRESERVATIVE FREE 10 ML: 5 INJECTION INTRAVENOUS at 08:50

## 2024-03-12 RX ADMIN — ESCITALOPRAM OXALATE 20 MG: 10 TABLET ORAL at 08:45

## 2024-03-12 RX ADMIN — OXYCODONE 10 MG: 5 TABLET ORAL at 13:40

## 2024-03-12 RX ADMIN — ALPRAZOLAM 1 MG: 0.5 TABLET ORAL at 08:44

## 2024-03-12 RX ADMIN — GABAPENTIN 300 MG: 300 CAPSULE ORAL at 08:44

## 2024-03-12 RX ADMIN — OXYCODONE 10 MG: 5 TABLET ORAL at 03:39

## 2024-03-12 RX ADMIN — ASPIRIN 81 MG: 81 TABLET, COATED ORAL at 08:44

## 2024-03-12 RX ADMIN — LOSARTAN POTASSIUM 25 MG: 25 TABLET, FILM COATED ORAL at 08:45

## 2024-03-12 RX ADMIN — ACETAMINOPHEN 1000 MG: 500 TABLET, FILM COATED ORAL at 05:49

## 2024-03-12 RX ADMIN — OXYCODONE 10 MG: 5 TABLET ORAL at 08:48

## 2024-03-12 RX ADMIN — ATORVASTATIN CALCIUM 20 MG: 10 TABLET, FILM COATED ORAL at 08:48

## 2024-03-12 RX ADMIN — RIFAMPIN 300 MG: 300 CAPSULE ORAL at 08:45

## 2024-03-12 RX ADMIN — Medication 2000 MG: at 13:40

## 2024-03-12 RX ADMIN — Medication 2000 MG: at 05:46

## 2024-03-12 RX ADMIN — METHOCARBAMOL TABLETS 750 MG: 750 TABLET, COATED ORAL at 03:38

## 2024-03-12 RX ADMIN — AMLODIPINE BESYLATE 10 MG: 10 TABLET ORAL at 08:44

## 2024-03-12 RX ADMIN — DOCUSATE SODIUM 50MG AND SENNOSIDES 8.6MG 1 TABLET: 8.6; 5 TABLET, FILM COATED ORAL at 08:44

## 2024-03-12 ASSESSMENT — PAIN SCALES - GENERAL
PAINLEVEL_OUTOF10: 6
PAINLEVEL_OUTOF10: 5
PAINLEVEL_OUTOF10: 4

## 2024-03-12 ASSESSMENT — PAIN DESCRIPTION - ORIENTATION: ORIENTATION: LEFT

## 2024-03-12 ASSESSMENT — PAIN DESCRIPTION - LOCATION: LOCATION: KNEE

## 2024-03-12 NOTE — CARE COORDINATION
Patient discharging today to home. I received call from  with wound vac approval- they will be delivering to his hospital room this afternoon.  [facility] as we have final home IVAB order for continuous infusion. Raymond to educate today and set pt up with home infusion pump prior to d/c later today. Maddi with [facility] aware of d/c. Pt agrees with plans.        03/09/24 1056   Service Assessment   Patient Orientation Alert and Oriented   Cognition Alert   History Provided By Patient   Primary Caregiver Self   Support Systems Spouse/Significant Other   Patient's Healthcare Decision Maker is: Legal Next of Kin   PCP Verified by CM Yes   Last Visit to PCP Within last 3 months   Prior Functional Level Independent in ADLs/IADLs   Current Functional Level Independent in ADLs/IADLs   Can patient return to prior living arrangement Yes   Ability to make needs known: Good   Family able to assist with home care needs: Yes   Would you like for me to discuss the discharge plan with any other family members/significant others, and if so, who? No   Financial Resources Medicare   Community Resources Other (Comment)  (n/a)   Social/Functional History   Lives With Alone   Type of Home House   Services At/After Discharge   Transition of Care Consult (CM Consult) Home Health   Internal Home Health Yes   Services At/After Discharge Home Health;PT;IV Therapy;Nursing services    Resource Information Provided? No   Mode of Transport at Discharge Self   Condition of Participation: Discharge Planning   The Plan for Transition of Care is related to the following treatment goals: Increase independence, wd vac and home IVAB management   The Patient and/or Patient Representative was provided with a Choice of Provider? Patient   The Patient and/Or Patient Representative agree with the Discharge Plan? Yes   Freedom of Choice list was provided with basic dialogue that supports the patient's individualized plan of  care/goals, treatment preferences, and shares the quality data associated with the providers?  Yes

## 2024-03-12 NOTE — PROGRESS NOTES
Infectious Disease Consult    Today's Date: 3/12/2024   Admit Date: 3/8/2024  1960  Chief Complaint:    Impression:   MSSA aureus L knee arthroplasty infection, initial surgery 2/9/24, s/p I&D with removal and replacement of hardware 3/8/24  Recent syncopal episode  Morbid obesity    Plan:   Stop vancomycin  I will arrange OPAT for home infusion and ID follow up in 3 and 6 weeks  Continue rifampin 300 mg po BID on discharge    Cefazolin 6g IV daily by continuous infusion; EOT 4/18/2024  Labs q Monday: CBC, ESR, CRP, creatinine      Anti-infectives:   Vancomycin (3/8 -   Zosyn (3/8-3/10)  Cefazolin (3/8-3/9) (3/10/2024 -   Bactrim (3/5 -3/7)  Rifampin (3/10/2024 - )    Subjective:   No new complaints.  Tolerating antibiotics without side effect. Working with PT.    HPI  Patient is a 63 y.o. male with an underlying medical history that includes hypertension, hyperlipidemia, obesity, spinal stenosis and L knee osteoarthritis with L patellar tendon rupture and repair. On 2/9/24 he underwent robotic-assisted total knee arthroplasty. He was seen in the ED 2/19 with olfactory hallucinations, thought to be related to benzodiazepine withdrawal, and then represented to the ED 2/25/24 after an episode of syncope, elevated D-dimer and troponin and ECG with noted unchanged R BBB. On 3/4/24 he had some yellow cloudy drainage from his incision, and was seen by Dr. Dominguez 3/5, wound vac placed and he was started on Bactrim - he reports he took about four doses prior to surgery.  Xray showed arthroplasty was stable. He was readmitted 3/8/24 for I&D, with findings of purulent material and the hardware was removed and replaced. Cultures are pending. Nasal MRSA swab negative 3/7/24, but previously positive 1/30/24. His creatinine on admission was elevated at 2.25 above prior (was 1.35 1/30/24). He is afebrile and has been started on antibiotics as noted above. ID has been asked for further recommendations.         No Known

## 2024-03-12 NOTE — PROGRESS NOTES
MG/DL    Est, Glom Filt Rate 58 (L) >60 ml/min/1.73m2    Calcium 9.1 8.3 - 10.4 MG/DL   C-Reactive Protein    Collection Time: 03/11/24  3:36 AM   Result Value Ref Range    CRP 2.8 (H) 0.0 - 0.9 mg/dL   Sedimentation Rate    Collection Time: 03/11/24  3:36 AM   Result Value Ref Range    Sed Rate, Automated 29 (H) 0 - 15 mm/hr   CBC with Auto Differential    Collection Time: 03/11/24  3:36 AM   Result Value Ref Range    WBC 8.1 4.3 - 11.1 K/uL    RBC 2.40 (L) 4.23 - 5.6 M/uL    Hemoglobin 7.3 (L) 13.6 - 17.2 g/dL    Hematocrit 22.4 (L) 41.1 - 50.3 %    MCV 93.3 82.0 - 102.0 FL    MCH 30.4 26.1 - 32.9 PG    MCHC 32.6 31.4 - 35.0 g/dL    RDW 12.9 11.9 - 14.6 %    Platelets 220 150 - 450 K/uL    MPV 11.0 9.4 - 12.3 FL    nRBC 0.00 0.0 - 0.2 K/uL    Differential Type AUTOMATED      Neutrophils % 54 43 - 78 %    Lymphocytes % 28 13 - 44 %    Monocytes % 11 4.0 - 12.0 %    Eosinophils % 5 0.5 - 7.8 %    Basophils % 1 0.0 - 2.0 %    Immature Granulocytes 1 0.0 - 5.0 %    Neutrophils Absolute 4.4 1.7 - 8.2 K/UL    Lymphocytes Absolute 2.3 0.5 - 4.6 K/UL    Monocytes Absolute 0.9 0.1 - 1.3 K/UL    Eosinophils Absolute 0.4 0.0 - 0.8 K/UL    Basophils Absolute 0.0 0.0 - 0.2 K/UL    Absolute Immature Granulocyte 0.1 0.0 - 0.5 K/UL   Basic Metabolic Panel    Collection Time: 03/12/24  3:29 AM   Result Value Ref Range    Sodium 139 136 - 146 mmol/L    Potassium 4.0 3.5 - 5.1 mmol/L    Chloride 105 103 - 113 mmol/L    CO2 25 21 - 32 mmol/L    Anion Gap 9 2 - 11 mmol/L    Glucose 105 (H) 65 - 100 mg/dL    BUN 23 8 - 23 MG/DL    Creatinine 1.22 0.8 - 1.5 MG/DL    Est, Glom Filt Rate >60 >60 ml/min/1.73m2    Calcium 9.2 8.3 - 10.4 MG/DL   C-Reactive Protein    Collection Time: 03/12/24  3:29 AM   Result Value Ref Range    CRP 6.1 (H) 0.0 - 0.9 mg/dL   Sedimentation Rate    Collection Time: 03/12/24  3:29 AM   Result Value Ref Range    Sed Rate, Automated 41 (H) 0 - 15 mm/hr   CBC with Auto Differential    Collection Time: 03/12/24   acetaminophen (TYLENOL) tablet 1,000 mg  1,000 mg Oral Q6H    oxyCODONE (ROXICODONE) immediate release tablet 10 mg  10 mg Oral Q4H PRN    HYDROmorphone HCl PF (DILAUDID) injection 1 mg  1 mg IntraVENous Q3H PRN    ondansetron (ZOFRAN-ODT) disintegrating tablet 4 mg  4 mg Oral Q8H PRN    Or    ondansetron (ZOFRAN) injection 4 mg  4 mg IntraVENous Q6H PRN    sennosides-docusate sodium (SENOKOT-S) 8.6-50 MG tablet 1 tablet  1 tablet Oral BID    aspirin EC tablet 81 mg  81 mg Oral BID    diphenhydrAMINE (BENADRYL) capsule 25 mg  25 mg Oral Q6H PRN    Or    diphenhydrAMINE (BENADRYL) injection 25 mg  25 mg IntraVENous Q6H PRN    ALPRAZolam (XANAX) tablet 1 mg  1 mg Oral BID    buPROPion (WELLBUTRIN SR) extended release tablet 100 mg  100 mg Oral Daily    busPIRone (BUSPAR) tablet 5 mg  5 mg Oral Nightly PRN    escitalopram (LEXAPRO) tablet 20 mg  20 mg Oral Daily    gabapentin (NEURONTIN) capsule 300 mg  300 mg Oral Daily    methocarbamol (ROBAXIN) tablet 750 mg  750 mg Oral TID PRN    cariprazine hcl (VRAYLAR) capsule 1.5 mg  (Patient Supplied)  1.5 mg Oral Daily    zolpidem (AMBIEN) tablet 5 mg  5 mg Oral Nightly PRN       Signed:  EMILY Puente - CNP    Part of this note may have been written by using a voice dictation software.  The note has been proof read but may still contain some grammatical/other typographical errors.

## 2024-03-12 NOTE — WOUND CARE
Left knee wound vac dressing changed. Wound is nearly approximated suture line, center is boggy and surgery notes indicate likely deep, unable to probe for full depth. Large amount of serousanganous drainage when vac was removed. Adaptic and wound vac sponge applied seal achieved. Machine working well. Papers completed, left on  desk.

## 2024-03-12 NOTE — PROGRESS NOTES
ACUTE PHYSICAL THERAPY GOALS:   (Developed with and agreed upon by patient and/or caregiver.)  GOALS (1-4 days):  (1.)Mr. Yo will move from supine to sit and sit to supine  in bed with STAND BY ASSIST. Met 3/10  (2.)Mr. Yo will transfer from bed to chair and chair to bed with STAND BY ASSIST using the least restrictive device. Met 3/10  (3.)Mr. Yo will ambulate with STAND BY ASSIST for 250 feet with the least restrictive device.Met 3/10  (4.)Mr. Yo will ambulate up/down 3 steps with L railing with CONTACT GUARD ASSIST.Met 3/10    NEW GOALS 3/10/24 :  1) bed mobility with supervision.  2) transfers with walker & KI with supervision -GOAL MET 3/12/24    3) pt ambulating 500 ft with rolling walker & KI with supervision.  4) pt able to go up & down 3 steps with left rail & SBA. Met 3/10    ________________________________________________________________________________________________           PHYSICAL THERAPY: TOTAL KNEE ARTHROPLASTY-revision Daily Note and AM  (Link to Caseload Tracking: PT Visit Days : 2  KNEE IMMOBILIZER AT ALL TIMES  Acknowledge Orders  Time In/Out  PT Charge Capture  Rehab Caseload Tracker  Episode   Gilbert Yo is a 63 y.o. 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]  Hx of total knee arthroplasty, left [Z96.652]  Open wound of left knee, initial encounter [S81.002A]  Procedure(s) (LRB):  KNEE TOTAL ARTHROPLASTY REVISION (Left)  4 Days Post-Op  Reason for Referral: Pain in Left Knee (M25.562)  Stiffness of Left Knee, Not elsewhere classified (M25.662)  Difficulty in walking, Not elsewhere classified (R26.2)  Other abnormalities of gait and mobility (R26.89)  Inpatient: Payor: MEDICARE / Plan: MEDICARE PART A / Product Type: *No Product type* /     REHAB RECOMMENDATIONS:   Recommendation to date pending progress:  Setting:  Home Health Therapy    Equipment:    None       GAIT: I Mod I S

## 2024-03-13 ENCOUNTER — HOME CARE VISIT (OUTPATIENT)
Dept: SCHEDULING | Facility: HOME HEALTH | Age: 64
End: 2024-03-13
Payer: COMMERCIAL

## 2024-03-13 ENCOUNTER — TELEPHONE (OUTPATIENT)
Dept: ORTHOPEDIC SURGERY | Age: 64
End: 2024-03-13

## 2024-03-13 VITALS
HEART RATE: 78 BPM | SYSTOLIC BLOOD PRESSURE: 124 MMHG | DIASTOLIC BLOOD PRESSURE: 68 MMHG | TEMPERATURE: 97.8 F | OXYGEN SATURATION: 98 % | RESPIRATION RATE: 18 BRPM

## 2024-03-13 LAB
FUNGAL CULT/SMEAR: NORMAL
FUNGUS SMEAR: NORMAL
SPECIMEN PROCESSING: NORMAL
SPECIMEN SOURCE: NORMAL

## 2024-03-13 PROCEDURE — 0221000100 HH NO PAY CLAIM PROCEDURE

## 2024-03-13 PROCEDURE — G0299 HHS/HOSPICE OF RN EA 15 MIN: HCPCS

## 2024-03-13 ASSESSMENT — ENCOUNTER SYMPTOMS
STOOL DESCRIPTION: SOFT FORMED
DYSPNEA ACTIVITY LEVEL: AFTER AMBULATING 10 - 20 FT
PAIN LOCATION - PAIN QUALITY: THROBBING

## 2024-03-13 NOTE — DISCHARGE SUMMARY
[facility]   Discharge Summary         Patient ID:  Gilbert Yo  [id number]  63 y.o.  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   Discharge Diagnoses: Open wound of left knee, initial encounter. Chronic and Acute medical problems addressed during this hospital stay by consulting physicians include:   They underwent Procedure(s) (LRB):  KNEE TOTAL ARTHROPLASTY REVISION (Left) for this.       Principle Problem: Open wound of left knee, initial encounter.     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 *                               Perioperative Antibiotics:  Prior to surgery Ancef 1 to 2 mg was given depending on patient's weight and allergies.  If the patient was allergic to Ancef or MRSA positive  the patient was [facility]comycin [facility]        [facility] Medications:   No current facility-administered medications for this encounter.     Current Outpatient Medications   Medication Sig    rifAMPin (RIFADIN) 300 MG capsule Take 1 capsule by mouth 2 times daily for 10 days    losartan (COZAAR) 25 MG tablet Take 1 tablet by mouth daily    amLODIPine

## 2024-03-13 NOTE — TELEPHONE ENCOUNTER
Left voice mail that giving verbal orders for pt and that they can get nursing to change the wound vac dressing

## 2024-03-13 NOTE — TELEPHONE ENCOUNTER
Lori w/ [facility] called needing verbal orders on pt for homecare visits she also he is having increasing drainage having a hard time getting a seal please f/u  [phone]

## 2024-03-14 ENCOUNTER — HOME CARE VISIT (OUTPATIENT)
Dept: SCHEDULING | Facility: HOME HEALTH | Age: 64
End: 2024-03-14
Payer: COMMERCIAL

## 2024-03-14 VITALS
HEART RATE: 72 BPM | TEMPERATURE: 97.7 F | DIASTOLIC BLOOD PRESSURE: 64 MMHG | OXYGEN SATURATION: 97 % | RESPIRATION RATE: 18 BRPM | SYSTOLIC BLOOD PRESSURE: 130 MMHG

## 2024-03-14 VITALS
HEART RATE: 80 BPM | OXYGEN SATURATION: 94 % | RESPIRATION RATE: 16 BRPM | SYSTOLIC BLOOD PRESSURE: 116 MMHG | DIASTOLIC BLOOD PRESSURE: 68 MMHG | TEMPERATURE: 98.4 F

## 2024-03-14 VITALS
OXYGEN SATURATION: 98 % | DIASTOLIC BLOOD PRESSURE: 68 MMHG | RESPIRATION RATE: 18 BRPM | HEART RATE: 80 BPM | SYSTOLIC BLOOD PRESSURE: 138 MMHG | TEMPERATURE: 97.8 F

## 2024-03-14 PROCEDURE — G0151 HHCP-SERV OF PT,EA 15 MIN: HCPCS

## 2024-03-14 PROCEDURE — G0152 HHCP-SERV OF OT,EA 15 MIN: HCPCS

## 2024-03-14 ASSESSMENT — ENCOUNTER SYMPTOMS
PAIN LOCATION - PAIN QUALITY: ACHE
DYSPNEA ACTIVITY LEVEL: AFTER AMBULATING 10 - 20 FT

## 2024-03-14 NOTE — ADT AUTH CERT
Anemia    Hypomagnesemia     Plan:  Patient looks very good.   Continue IV antibiotics and await cultures  Anticipated discharge pending PICC line unless otherwise stated by ID     Hospitalist:  Assessment & Plan:  Principal Problem:  Open wound of left knee, initial encounter  Infection of total left knee replacement   Hx of total knee arthroplasty, left  Plan: Irrigation and debridement of left knee on 3/8/2024   Estimated blood loss 400 cc  I have reviewed patient's labs and vital signs for past 24 hrs  MRSA swab neg  Sed rate, crp - now trending downward  WBC ct normal, afebrile  3/9; I broadened antibx coverage to zosyn and vancomycin  3/10; cx growing S aureus - location is L knee arthroplasty infection; ID changed antibx coverage; Cont vancomycin and cefazolin plus rifampin   3/11; awaiting final cx and sensitives for ID to pick antibx and determine EOT date.   Picc team to place access line today  I defer orders for PT/OT, antibx of choice, pain meds, DVT ppx, dressing changes / nursing care to surgical site, and discharge planning to the ortho team      Active Problems:    Cardiac murmur  Plan: Noted  I reviewed last echo dated 2/15/2023; patient's EF 55 to 60%.  Abnormal diastolic dysfunction.  Mild sclerosis of the aortic valve cups.       Hyperlipidemia  Plan: Patient may continue his home dose of Lipitor 20 at bedtime  Last lipid panel I can see to review through care everywhere is dated 9/1/2022 where total cholesterol is 133, triglycerides 99, HDL 47, LDL 68       Essential hypertension, benign  Plan: Blood pressure stable this morning at 115/86  Patient came in with an HUGO, IV fluids have helped significantly but we also  adjusted his medication doses  3/9; Increase Norvasc from 5 to 10 mg  3/9; Decrease losartan from 100 to 50 mg ---> 3/10 ? Consider lowering to 25mg a day and monitoring (see improving cr below and BP well controlled)  Monitor blood pressure with medication changes       HUGO (acute  (L)  Glucose, Random: 108 (H)  CRP: 2.4 (H)  Vancomycin Rm: 20.7  RBC: 2.42 (L)  Hemoglobin Quant: 7.3 (L)  Hematocrit: 23.2 (L)  Sed Rate, Automated: 19 (H)     MICROBIOLOGY:  Anaerobic Culture x3  pending     Fungus Culture x5  pending     Fungus x5  pending     PHYSICAL EXAM:   General:          Well nourished. Obese. Tall. Sitting in recliner.   Head:               Normocephalic, atraumatic  Eyes:               Sclerae appear normal.  Pupils equally round.  ENT:                Nares appear normal.  Moist oral mucosa  Neck:               No restricted ROM.  Trachea midline   CV:                  RRR. + Murmur, soft. No jugular venous distension.  Lungs:             CTAB. No wheezing, rhonchi, not on O2. Symmetric expansion.  Abdomen:        Soft, nontender, nondistended. + Bowel sounds throughout.  Extremities:     No cyanosis or clubbing.  Right knee with surgical dressing and wound VAC on. Dressing intact.    Skin:                No rashes and normal coloration. Warm and dry.    Neuro:             CN II-XII grossly intact.  Sensation intact. A&O x4. Working w PT.   Psych:             Normal mood and affect.       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  Hx of total knee arthroplasty, left  Plan: Irrigation and debridement of left knee on 3/8/2024 with   Estimated blood loss 400 cc  I have reviewed patient's labs and vital signs for past 24 hrs  MRSA swab neg  Sed rate, crp - now trending downward  WBC ct normal, afebrile  Wound cx pending but currently showing no growth; fungus pending as well  3/9; I broadened antibx coverage to zosyn and vancomycin  ID saw and agrees w antibx coverage until cx are final  Continue to monitor for fever, trend labs   I defer orders for PT/OT, antibx of choice, pain meds, DVT ppx, dressing changes / nursing care to surgical site, and discharge planning to the

## 2024-03-15 LAB
BACTERIA SPEC CULT: NORMAL
SERVICE CMNT-IMP: NORMAL

## 2024-03-16 ENCOUNTER — HOME CARE VISIT (OUTPATIENT)
Dept: SCHEDULING | Facility: HOME HEALTH | Age: 64
End: 2024-03-16
Payer: COMMERCIAL

## 2024-03-16 VITALS
DIASTOLIC BLOOD PRESSURE: 76 MMHG | SYSTOLIC BLOOD PRESSURE: 128 MMHG | HEART RATE: 72 BPM | TEMPERATURE: 98.7 F | RESPIRATION RATE: 16 BRPM | OXYGEN SATURATION: 99 %

## 2024-03-16 PROCEDURE — G0299 HHS/HOSPICE OF RN EA 15 MIN: HCPCS

## 2024-03-16 ASSESSMENT — ENCOUNTER SYMPTOMS
PAIN LOCATION - PAIN QUALITY: THROBBING
STOOL DESCRIPTION: SOFT

## 2024-03-18 ENCOUNTER — HOME CARE VISIT (OUTPATIENT)
Dept: SCHEDULING | Facility: HOME HEALTH | Age: 64
End: 2024-03-18
Payer: COMMERCIAL

## 2024-03-18 ENCOUNTER — HOSPITAL ENCOUNTER (OUTPATIENT)
Age: 64
Setting detail: SPECIMEN
Discharge: HOME | End: 2024-03-21
Payer: COMMERCIAL

## 2024-03-18 ENCOUNTER — HOME CARE VISIT (OUTPATIENT)
Dept: HOME HEALTH SERVICES | Facility: HOME HEALTH | Age: 64
End: 2024-03-18
Payer: COMMERCIAL

## 2024-03-18 VITALS
RESPIRATION RATE: 16 BRPM | HEART RATE: 76 BPM | OXYGEN SATURATION: 96 % | SYSTOLIC BLOOD PRESSURE: 140 MMHG | DIASTOLIC BLOOD PRESSURE: 70 MMHG | TEMPERATURE: 98 F

## 2024-03-18 LAB
BASOPHILS # BLD: 0 K/UL (ref 0–0.2)
BASOPHILS NFR BLD: 0 % (ref 0–2)
CREAT SERPL-MCNC: 1.14 MG/DL (ref 0.8–1.5)
CRP SERPL-MCNC: 1.6 MG/DL (ref 0–0.9)
DIFFERENTIAL METHOD BLD: ABNORMAL
EOSINOPHIL # BLD: 0.2 K/UL (ref 0–0.8)
EOSINOPHIL NFR BLD: 4 % (ref 0.5–7.8)
ERYTHROCYTE [DISTWIDTH] IN BLOOD BY AUTOMATED COUNT: 14.7 % (ref 11.9–14.6)
ERYTHROCYTE [SEDIMENTATION RATE] IN BLOOD: 32 MM/HR (ref 0–15)
FUNGUS SMEAR: NORMAL
HCT VFR BLD AUTO: 24.4 % (ref 41.1–50.3)
HGB BLD-MCNC: 7.8 G/DL (ref 13.6–17.2)
IMM GRANULOCYTES # BLD AUTO: 0 K/UL (ref 0–0.5)
IMM GRANULOCYTES NFR BLD AUTO: 0 % (ref 0–5)
LYMPHOCYTES # BLD: 1.8 K/UL (ref 0.5–4.6)
LYMPHOCYTES NFR BLD: 26 % (ref 13–44)
MCH RBC QN AUTO: 30.5 PG (ref 26.1–32.9)
MCHC RBC AUTO-ENTMCNC: 32 G/DL (ref 31.4–35)
MCV RBC AUTO: 95.3 FL (ref 82–102)
MONOCYTES # BLD: 0.6 K/UL (ref 0.1–1.3)
MONOCYTES NFR BLD: 10 % (ref 4–12)
NEUTS SEG # BLD: 4 K/UL (ref 1.7–8.2)
NEUTS SEG NFR BLD: 60 % (ref 43–78)
NRBC # BLD: 0 K/UL (ref 0–0.2)
PLATELET # BLD AUTO: 270 K/UL (ref 150–450)
PMV BLD AUTO: 10.4 FL (ref 9.4–12.3)
RBC # BLD AUTO: 2.56 M/UL (ref 4.23–5.6)
SPECIMEN SOURCE: NORMAL
WBC # BLD AUTO: 6.7 K/UL (ref 4.3–11.1)

## 2024-03-18 PROCEDURE — G0151 HHCP-SERV OF PT,EA 15 MIN: HCPCS

## 2024-03-18 PROCEDURE — 85025 COMPLETE CBC W/AUTO DIFF WBC: CPT

## 2024-03-18 PROCEDURE — G0299 HHS/HOSPICE OF RN EA 15 MIN: HCPCS

## 2024-03-18 PROCEDURE — 86140 C-REACTIVE PROTEIN: CPT

## 2024-03-18 PROCEDURE — 85652 RBC SED RATE AUTOMATED: CPT

## 2024-03-18 PROCEDURE — 82565 ASSAY OF CREATININE: CPT

## 2024-03-19 VITALS
DIASTOLIC BLOOD PRESSURE: 88 MMHG | TEMPERATURE: 98.3 F | HEART RATE: 95 BPM | OXYGEN SATURATION: 95 % | RESPIRATION RATE: 18 BRPM | SYSTOLIC BLOOD PRESSURE: 152 MMHG

## 2024-03-19 VITALS
RESPIRATION RATE: 16 BRPM | SYSTOLIC BLOOD PRESSURE: 135 MMHG | HEART RATE: 71 BPM | DIASTOLIC BLOOD PRESSURE: 70 MMHG | TEMPERATURE: 98 F | OXYGEN SATURATION: 98 %

## 2024-03-19 ASSESSMENT — ENCOUNTER SYMPTOMS: CONSTIPATION: 1

## 2024-03-20 ENCOUNTER — HOME CARE VISIT (OUTPATIENT)
Dept: SCHEDULING | Facility: HOME HEALTH | Age: 64
End: 2024-03-20
Payer: COMMERCIAL

## 2024-03-20 ENCOUNTER — TELEPHONE (OUTPATIENT)
Dept: ORTHOPEDIC SURGERY | Age: 64
End: 2024-03-20

## 2024-03-20 VITALS
SYSTOLIC BLOOD PRESSURE: 128 MMHG | HEART RATE: 68 BPM | RESPIRATION RATE: 18 BRPM | TEMPERATURE: 98 F | OXYGEN SATURATION: 98 % | DIASTOLIC BLOOD PRESSURE: 72 MMHG

## 2024-03-20 VITALS
HEART RATE: 74 BPM | RESPIRATION RATE: 16 BRPM | DIASTOLIC BLOOD PRESSURE: 74 MMHG | OXYGEN SATURATION: 96 % | SYSTOLIC BLOOD PRESSURE: 140 MMHG | TEMPERATURE: 97.8 F

## 2024-03-20 PROCEDURE — G0299 HHS/HOSPICE OF RN EA 15 MIN: HCPCS

## 2024-03-20 PROCEDURE — G0157 HHC PT ASSISTANT EA 15: HCPCS

## 2024-03-20 ASSESSMENT — ENCOUNTER SYMPTOMS: PAIN LOCATION - PAIN QUALITY: ACHING

## 2024-03-20 NOTE — TELEPHONE ENCOUNTER
Spoke with konstantin and let her know we would change dressing tomorrow and she emptied the canister there was 250cc from the weekend in the canister.she will send dressing with patient for an appt

## 2024-03-20 NOTE — TELEPHONE ENCOUNTER
[facility] is at patients home now call # [phone] needs to know if ok to leave drsg on since patient has apt w/ DAWIT tomorrow

## 2024-03-21 ENCOUNTER — OFFICE VISIT (OUTPATIENT)
Dept: ORTHOPEDIC SURGERY | Age: 64
End: 2024-03-21

## 2024-03-21 DIAGNOSIS — Z96.652 STATUS POST LEFT KNEE REPLACEMENT: Primary | ICD-10-CM

## 2024-03-21 NOTE — PROGRESS NOTES
Post-op TKA Visit      03/21/24     No Known Allergies  Current Outpatient Medications on File Prior to Visit   Medication Sig Dispense Refill    buPROPion (WELLBUTRIN SR) 150 MG extended release tablet Take 150 mg by mouth daily.      celecoxib (CELEBREX) 200 MG capsule Take 200 mg by mouth 2 times daily.      vitamin D (VITAMIN D3) 125 MCG (5000 UT) CAPS capsule Take 1 capsule by mouth daily.      Sodium Chloride Flush (SALINE FLUSH IV) Infuse 10 mLs intravenously daily.      Heparin Sod, Pork, Lock Flush (HEPARIN, PORCINE, LOCK FLUSH IV) Infuse 5 mLs intravenously daily.      sodium chloride 0.9 % SOLN 100 mL with ceFAZolin 1 g SOLR 2,000 mg Infuse 6 g intravenously continuous. infuse daily over 24 hours.      rifAMPin (RIFADIN) 300 MG capsule Take 1 capsule by mouth 2 times daily for 10 days 20 capsule 0    losartan (COZAAR) 25 MG tablet Take 1 tablet by mouth daily 30 tablet 2    amLODIPine (NORVASC) 10 MG tablet Take 1 tablet by mouth daily 30 tablet 2    aspirin (ASPIRIN 81) 81 MG EC tablet Take 1 tablet by mouth in the morning and at bedtime 70 tablet 0    celecoxib (CELEBREX) 200 MG capsule Take 1 capsule by mouth 2 times daily (Patient not taking: Reported on 3/7/2024) 60 capsule 0    promethazine (PHENERGAN) 12.5 MG tablet Take 1 tablet by mouth 4 times daily as needed for Nausea 20 tablet 2    methocarbamol (ROBAXIN-750) 750 MG tablet Take 1 tablet by mouth 3 times daily as needed (muscle spasm or cramps) 40 tablet 1    buPROPion (WELLBUTRIN SR) 100 MG extended release tablet Take 150 mg by mouth daily      VRAYLAR 1.5 MG capsule Take 1 capsule by mouth daily      QUVIVIQ 50 MG TABS Take 50 mg by mouth at bedtime      gabapentin (NEURONTIN) 300 MG capsule Take 1 capsule by mouth daily.      metFORMIN (GLUCOPHAGE-XR) 500 MG extended release tablet Take 1 tablet by mouth 2 times daily      busPIRone (BUSPAR) 5 MG tablet Take 1-2 tablets by mouth nightly as needed (sleep aid)      ALPRAZolam (XANAX) 1 MG

## 2024-03-23 ENCOUNTER — HOME CARE VISIT (OUTPATIENT)
Dept: SCHEDULING | Facility: HOME HEALTH | Age: 64
End: 2024-03-23
Payer: COMMERCIAL

## 2024-03-23 VITALS
TEMPERATURE: 97.8 F | SYSTOLIC BLOOD PRESSURE: 138 MMHG | RESPIRATION RATE: 16 BRPM | DIASTOLIC BLOOD PRESSURE: 82 MMHG | HEART RATE: 59 BPM | OXYGEN SATURATION: 98 %

## 2024-03-23 PROCEDURE — G0299 HHS/HOSPICE OF RN EA 15 MIN: HCPCS

## 2024-03-23 ASSESSMENT — ENCOUNTER SYMPTOMS
STOOL DESCRIPTION: SOFT FORMED
PAIN LOCATION - PAIN QUALITY: ACHING

## 2024-03-25 ENCOUNTER — HOME CARE VISIT (OUTPATIENT)
Dept: SCHEDULING | Facility: HOME HEALTH | Age: 64
End: 2024-03-25
Payer: COMMERCIAL

## 2024-03-25 ENCOUNTER — HOSPITAL ENCOUNTER (OUTPATIENT)
Dept: LAB | Age: 64
Discharge: HOME | End: 2024-03-28
Payer: COMMERCIAL

## 2024-03-25 VITALS
HEART RATE: 66 BPM | DIASTOLIC BLOOD PRESSURE: 70 MMHG | RESPIRATION RATE: 16 BRPM | OXYGEN SATURATION: 97 % | TEMPERATURE: 98 F | SYSTOLIC BLOOD PRESSURE: 135 MMHG

## 2024-03-25 LAB
CREAT SERPL-MCNC: 0.91 MG/DL (ref 0.8–1.5)
CRP SERPL-MCNC: 0.5 MG/DL (ref 0–0.9)
ERYTHROCYTE [DISTWIDTH] IN BLOOD BY AUTOMATED COUNT: 14.9 % (ref 11.9–14.6)
ERYTHROCYTE [SEDIMENTATION RATE] IN BLOOD: 34 MM/HR (ref 0–15)
HCT VFR BLD AUTO: 28.6 % (ref 41.1–50.3)
HGB BLD-MCNC: 9.1 G/DL (ref 13.6–17.2)
MCH RBC QN AUTO: 30.4 PG (ref 26.1–32.9)
MCHC RBC AUTO-ENTMCNC: 31.8 G/DL (ref 31.4–35)
MCV RBC AUTO: 95.7 FL (ref 82–102)
NRBC # BLD: 0 K/UL (ref 0–0.2)
PLATELET # BLD AUTO: 266 K/UL (ref 150–450)
PMV BLD AUTO: 11.1 FL (ref 9.4–12.3)
RBC # BLD AUTO: 2.99 M/UL (ref 4.23–5.6)
WBC # BLD AUTO: 4.2 K/UL (ref 4.3–11.1)

## 2024-03-25 PROCEDURE — 82565 ASSAY OF CREATININE: CPT

## 2024-03-25 PROCEDURE — 86140 C-REACTIVE PROTEIN: CPT

## 2024-03-25 PROCEDURE — 85652 RBC SED RATE AUTOMATED: CPT

## 2024-03-25 PROCEDURE — G0299 HHS/HOSPICE OF RN EA 15 MIN: HCPCS

## 2024-03-25 PROCEDURE — 85027 COMPLETE CBC AUTOMATED: CPT

## 2024-03-26 ENCOUNTER — HOME CARE VISIT (OUTPATIENT)
Dept: SCHEDULING | Facility: HOME HEALTH | Age: 64
End: 2024-03-26
Payer: COMMERCIAL

## 2024-03-26 PROCEDURE — G0157 HHC PT ASSISTANT EA 15: HCPCS

## 2024-03-27 ENCOUNTER — TELEPHONE (OUTPATIENT)
Dept: ORTHOPEDIC SURGERY | Age: 64
End: 2024-03-27

## 2024-03-27 ENCOUNTER — HOME CARE VISIT (OUTPATIENT)
Dept: SCHEDULING | Facility: HOME HEALTH | Age: 64
End: 2024-03-27
Payer: COMMERCIAL

## 2024-03-27 VITALS
TEMPERATURE: 98.3 F | SYSTOLIC BLOOD PRESSURE: 122 MMHG | RESPIRATION RATE: 18 BRPM | DIASTOLIC BLOOD PRESSURE: 70 MMHG | HEART RATE: 64 BPM | OXYGEN SATURATION: 98 %

## 2024-03-27 VITALS
SYSTOLIC BLOOD PRESSURE: 140 MMHG | HEART RATE: 90 BPM | TEMPERATURE: 98.4 F | RESPIRATION RATE: 18 BRPM | DIASTOLIC BLOOD PRESSURE: 62 MMHG | OXYGEN SATURATION: 97 %

## 2024-03-27 PROCEDURE — G0299 HHS/HOSPICE OF RN EA 15 MIN: HCPCS

## 2024-03-27 NOTE — TELEPHONE ENCOUNTER
Spoke with sukhwinder and let her know we would change the dressing. Had to change the canister on Saturday and and 4 :00am today.

## 2024-03-27 NOTE — TELEPHONE ENCOUNTER
He is scheduled to see DAWIT tomorrow. She is seeing him today. Dos she need to take the dressing off or is DAWIT going to do it tomorrow. Please let her know.

## 2024-03-28 ENCOUNTER — HOME CARE VISIT (OUTPATIENT)
Dept: SCHEDULING | Facility: HOME HEALTH | Age: 64
End: 2024-03-28
Payer: COMMERCIAL

## 2024-03-28 ENCOUNTER — OFFICE VISIT (OUTPATIENT)
Dept: ORTHOPEDIC SURGERY | Age: 64
End: 2024-03-28

## 2024-03-28 VITALS
OXYGEN SATURATION: 98 % | SYSTOLIC BLOOD PRESSURE: 122 MMHG | TEMPERATURE: 98 F | DIASTOLIC BLOOD PRESSURE: 58 MMHG | RESPIRATION RATE: 18 BRPM | HEART RATE: 67 BPM

## 2024-03-28 DIAGNOSIS — M17.12 PRIMARY OSTEOARTHRITIS OF LEFT KNEE: ICD-10-CM

## 2024-03-28 PROCEDURE — G0157 HHC PT ASSISTANT EA 15: HCPCS

## 2024-03-28 RX ORDER — CELECOXIB 200 MG/1
200 CAPSULE ORAL 2 TIMES DAILY
Qty: 60 CAPSULE | Refills: 1 | Status: SHIPPED | OUTPATIENT
Start: 2024-03-28 | End: 2024-05-27

## 2024-03-28 ASSESSMENT — ENCOUNTER SYMPTOMS: PAIN LOCATION - PAIN QUALITY: ACHE

## 2024-03-28 NOTE — PROGRESS NOTES
mouth in the morning and at bedtime, Disp: 70 tablet, Rfl: 0    promethazine (PHENERGAN) 12.5 MG tablet, Take 1 tablet by mouth 4 times daily as needed for Nausea, Disp: 20 tablet, Rfl: 2    methocarbamol (ROBAXIN-750) 750 MG tablet, Take 1 tablet by mouth 3 times daily as needed (muscle spasm or cramps), Disp: 40 tablet, Rfl: 1    buPROPion (WELLBUTRIN SR) 100 MG extended release tablet, Take 150 mg by mouth daily, Disp: , Rfl:     VRAYLAR 1.5 MG capsule, Take 1 capsule by mouth daily, Disp: , Rfl:     QUVIVIQ 50 MG TABS, Take 50 mg by mouth at bedtime, Disp: , Rfl:     gabapentin (NEURONTIN) 300 MG capsule, Take 1 capsule by mouth daily., Disp: , Rfl:     metFORMIN (GLUCOPHAGE-XR) 500 MG extended release tablet, Take 1 tablet by mouth 2 times daily, Disp: , Rfl:     busPIRone (BUSPAR) 5 MG tablet, Take 1-2 tablets by mouth nightly as needed (sleep aid), Disp: , Rfl:     ALPRAZolam (XANAX) 1 MG tablet, Take 1 tablet by mouth in the morning and at bedtime. Takes 2 in morning and 1 at bedtime, Disp: , Rfl:     atorvastatin (LIPITOR) 20 MG tablet, Take 1 tablet by mouth daily, Disp: , Rfl:     escitalopram (LEXAPRO) 20 MG tablet, Take 1 tablet by mouth daily, Disp: , Rfl:    Past Medical History:   Diagnosis Date    Anemia     Anxiety and depression     managed with medication; followed by psych    Arthritis     Awareness under anesthesia 1980s    with umb hernia surg    Back pain     BPH (benign prostatic hyperplasia)     Bulging lumbar disc     L4-L5    ED (erectile dysfunction)     Essential hypertension, benign     controlled with med    H/O colonoscopy 2018    due in 2027    High cholesterol     managed with medication    History of anemia     History of echocardiogram 02/14/2023    Left Ventricle: Normal left ventricular systolic function with a est EF 55- 60%. Left ventricle size is nml. Mildly increased wall thickness. Nml wall motion. Abn diastolic function. Aortic Valve: Mild sclerosis of aortic valve cusp.

## 2024-03-29 DIAGNOSIS — S81.009A: Primary | ICD-10-CM

## 2024-03-29 DIAGNOSIS — Z96.652 STATUS POST LEFT KNEE REPLACEMENT: ICD-10-CM

## 2024-03-30 ENCOUNTER — HOME CARE VISIT (OUTPATIENT)
Dept: SCHEDULING | Facility: HOME HEALTH | Age: 64
End: 2024-03-30
Payer: COMMERCIAL

## 2024-03-30 PROCEDURE — G0299 HHS/HOSPICE OF RN EA 15 MIN: HCPCS

## 2024-04-01 ENCOUNTER — HOME CARE VISIT (OUTPATIENT)
Dept: SCHEDULING | Facility: HOME HEALTH | Age: 64
End: 2024-04-01
Payer: COMMERCIAL

## 2024-04-01 ENCOUNTER — HOSPITAL ENCOUNTER (OUTPATIENT)
Dept: LAB | Age: 64
Setting detail: SPECIMEN
Discharge: HOME | End: 2024-04-04
Payer: COMMERCIAL

## 2024-04-01 VITALS
TEMPERATURE: 98 F | SYSTOLIC BLOOD PRESSURE: 118 MMHG | HEART RATE: 72 BPM | OXYGEN SATURATION: 98 % | DIASTOLIC BLOOD PRESSURE: 64 MMHG | RESPIRATION RATE: 18 BRPM

## 2024-04-01 VITALS
RESPIRATION RATE: 16 BRPM | OXYGEN SATURATION: 97 % | TEMPERATURE: 98.7 F | HEART RATE: 66 BPM | DIASTOLIC BLOOD PRESSURE: 62 MMHG | SYSTOLIC BLOOD PRESSURE: 122 MMHG

## 2024-04-01 LAB
BASOPHILS # BLD: 0 K/UL (ref 0–0.2)
BASOPHILS NFR BLD: 0 % (ref 0–2)
CREAT SERPL-MCNC: 0.95 MG/DL (ref 0.8–1.5)
CRP SERPL-MCNC: <0.3 MG/DL (ref 0–0.9)
DIFFERENTIAL METHOD BLD: ABNORMAL
EOSINOPHIL # BLD: 0.5 K/UL (ref 0–0.8)
EOSINOPHIL NFR BLD: 12 % (ref 0.5–7.8)
ERYTHROCYTE [DISTWIDTH] IN BLOOD BY AUTOMATED COUNT: 14.6 % (ref 11.9–14.6)
ERYTHROCYTE [SEDIMENTATION RATE] IN BLOOD: 16 MM/HR (ref 0–15)
HCT VFR BLD AUTO: 30.9 % (ref 41.1–50.3)
HGB BLD-MCNC: 9.8 G/DL (ref 13.6–17.2)
IMM GRANULOCYTES # BLD AUTO: 0 K/UL (ref 0–0.5)
IMM GRANULOCYTES NFR BLD AUTO: 0 % (ref 0–5)
LYMPHOCYTES # BLD: 1.5 K/UL (ref 0.5–4.6)
LYMPHOCYTES NFR BLD: 35 % (ref 13–44)
MCH RBC QN AUTO: 29.8 PG (ref 26.1–32.9)
MCHC RBC AUTO-ENTMCNC: 31.7 G/DL (ref 31.4–35)
MCV RBC AUTO: 93.9 FL (ref 82–102)
MONOCYTES # BLD: 0.5 K/UL (ref 0.1–1.3)
MONOCYTES NFR BLD: 11 % (ref 4–12)
NEUTS SEG # BLD: 1.9 K/UL (ref 1.7–8.2)
NEUTS SEG NFR BLD: 43 % (ref 43–78)
NRBC # BLD: 0 K/UL (ref 0–0.2)
PLATELET # BLD AUTO: 245 K/UL (ref 150–450)
PMV BLD AUTO: 11.5 FL (ref 9.4–12.3)
RBC # BLD AUTO: 3.29 M/UL (ref 4.23–5.6)
WBC # BLD AUTO: 4.5 K/UL (ref 4.3–11.1)

## 2024-04-01 PROCEDURE — 85025 COMPLETE CBC W/AUTO DIFF WBC: CPT

## 2024-04-01 PROCEDURE — 85652 RBC SED RATE AUTOMATED: CPT

## 2024-04-01 PROCEDURE — G0299 HHS/HOSPICE OF RN EA 15 MIN: HCPCS

## 2024-04-01 PROCEDURE — 82565 ASSAY OF CREATININE: CPT

## 2024-04-01 PROCEDURE — 86140 C-REACTIVE PROTEIN: CPT

## 2024-04-02 ENCOUNTER — HOME CARE VISIT (OUTPATIENT)
Dept: SCHEDULING | Facility: HOME HEALTH | Age: 64
End: 2024-04-02
Payer: COMMERCIAL

## 2024-04-02 ENCOUNTER — HOSPITAL ENCOUNTER (OUTPATIENT)
Dept: OTHER | Age: 64
Setting detail: RECURRING SERIES
Discharge: HOME | End: 2024-04-05
Payer: MEDICARE

## 2024-04-02 PROCEDURE — G0157 HHC PT ASSISTANT EA 15: HCPCS

## 2024-04-02 PROCEDURE — 36573 INSJ PICC RS&I 5 YR+: CPT

## 2024-04-02 PROCEDURE — C1751 CATH, INF, PER/CENT/MIDLINE: HCPCS

## 2024-04-02 RX ORDER — SODIUM CHLORIDE 0.9 % (FLUSH) 0.9 %
5-40 SYRINGE (ML) INJECTION EVERY 12 HOURS SCHEDULED
Status: DISCONTINUED | OUTPATIENT
Start: 2024-04-02 | End: 2024-04-06 | Stop reason: HOSPADM

## 2024-04-02 RX ORDER — SODIUM CHLORIDE 0.9 % (FLUSH) 0.9 %
5-40 SYRINGE (ML) INJECTION PRN
Status: DISCONTINUED | OUTPATIENT
Start: 2024-04-02 | End: 2024-04-06 | Stop reason: HOSPADM

## 2024-04-02 RX ORDER — SODIUM CHLORIDE 9 MG/ML
25 INJECTION, SOLUTION INTRAVENOUS PRN
Status: DISCONTINUED | OUTPATIENT
Start: 2024-04-02 | End: 2024-04-06 | Stop reason: HOSPADM

## 2024-04-02 NOTE — PROGRESS NOTES
PICC Placement Note    PRE-PROCEDURE VERIFICATION    Correct Procedure: yes  Time out completed with assistant Shira Mejia RN, VA-BC and all persons present in agreement with time out.  Risks and benefits reviewed with patient (via telephone) and informed consent obtained prior to assessment and procedure.     Correct Site: yes  Temperature:  , Temperature Source:    Recent Labs     04/01/24  1520      WBC 4.5     Allergies: Patient has no known allergies.  Education materials for PICC Care given to patient or family.    PROCEDURE DETAIL  A single lumen PICC line was started for Long term IV/antibiotic administration. The following documentation is in addition to the PICC properties in the lines/airways flowsheet:    Lot #: [id number]  Xylocaine used: Yes  Mid-Arm Circumference: 39 (cm)  Internal Catheter Length: 47 (cm)  External Catheter Length: 0 (cm)  Total Catheter Length: 47 (cm)  Vein Selection for PICC: left basilic  Central Line Insertion Bundle followed: Yes  Complication Related to Insertion: none. Left picc placed to replace right picc catheter. Right picc catheter out by 8cm; all 43cm of catheter removed and covered with 2x2 with antibiotic ointment. Instructed pt to leave dressing on for at least 24 hours. Verbalized understanding.    Both the insertion guidewire and ECG guidewire were removed intact all ports have positive blood return and were flush well with normal saline.    The location of the tip of the PICC is verified using ECG technology.  The tip is in the SVC per ECG reading.  See image below.     Line is okay to use: yes            Marianna Centeno RN, PCCN, VA-BC

## 2024-04-03 ENCOUNTER — HOSPITAL ENCOUNTER (OUTPATIENT)
Dept: WOUND CARE | Age: 64
Discharge: HOME | End: 2024-04-03
Payer: COMMERCIAL

## 2024-04-03 ENCOUNTER — HOME CARE VISIT (OUTPATIENT)
Dept: SCHEDULING | Facility: HOME HEALTH | Age: 64
End: 2024-04-03
Payer: COMMERCIAL

## 2024-04-03 VITALS
HEART RATE: 82 BPM | DIASTOLIC BLOOD PRESSURE: 58 MMHG | SYSTOLIC BLOOD PRESSURE: 120 MMHG | RESPIRATION RATE: 18 BRPM | OXYGEN SATURATION: 98 % | TEMPERATURE: 98.2 F

## 2024-04-03 VITALS
WEIGHT: 315 LBS | RESPIRATION RATE: 16 BRPM | TEMPERATURE: 97.9 F | OXYGEN SATURATION: 97 % | BODY MASS INDEX: 40.43 KG/M2 | DIASTOLIC BLOOD PRESSURE: 49 MMHG | HEART RATE: 71 BPM | HEIGHT: 74 IN | SYSTOLIC BLOOD PRESSURE: 117 MMHG

## 2024-04-03 PROCEDURE — 99213 OFFICE O/P EST LOW 20 MIN: CPT

## 2024-04-03 PROCEDURE — 99203 OFFICE O/P NEW LOW 30 MIN: CPT | Performed by: FAMILY MEDICINE

## 2024-04-03 ASSESSMENT — ENCOUNTER SYMPTOMS: PAIN LOCATION - PAIN QUALITY: ACH

## 2024-04-03 NOTE — WOUND CARE
Discharge Instructions for  [facility]  [address] [place] [address]  [address]  [place] [address]  Phone [phone]   Fax [phone]      NAME:  Gilbert Yo  YOB: 1960  MEDICAL RECORD NUMBER:  [id number]  DATE:  4/3/2024    Return Appointment:   1 week with Abiel Nicholas DO      Instructions:   NPWT on HOLD for 1 week.    Left Knee:  Cleanse wound with Normal Saline.  Vashe moistened gauze to wound bed and periwound for 1 minute   Aquacel Ag to wound bed.  Cover with ABD  Wrap with Kerlix.  Dressing change 1 - 2 x daily as needed.  Follow dressing with Tubigrip.  Follow with Immobilizer, as prescribed by your surgeon.    Home Health for dressing changes 2x each week. Patient to change at all other times. Please order enough supplies for patient to keep wound dressing dry and intact.    Compression: Tubigrip from base of toes to mid thigh using E, F, and G to graduate the compression appropriately.  Elevate lower legs when not ambulating. Activity is beneficial.  Protein 100 gms daily. Intake throughout the day.     Continue IV antibiotics - home self-administration - as prescribed by surgeon    Should you experience increased redness, swelling, pain, foul odor, size of wound(s), or have a temperature over 101 degrees please contact the Wound Healing Center at [phone] or if after hours contact your primary care physician or go to the hospital emergency department.    PLEASE NOTE: IF YOU ARE UNABLE TO OBTAIN WOUND SUPPLIES, CONTINUE TO USE THE SUPPLIES YOU HAVE AVAILABLE UNTIL YOU ARE ABLE TO REACH US. IT IS MOST IMPORTANT TO KEEP THE WOUND COVERED AT ALL TIMES.    Electronically signed Cielo Rodas RN on 4/3/2024 at 12:26 PM

## 2024-04-03 NOTE — FLOWSHEET NOTE
04/03/24 1130   Negative Pressure Wound Therapy Knee Left;Anterior   Placement Date/Time: 03/08/24 1300   Present on Admission/Arrival: No  Location: Knee  Wound Location Orientation: Left;Anterior   Wound Type Surgical   Unit Type KCI   Dressing Type Black Foam   Number of pieces used   (NPWT on hold)   Number of pieces removed 2   Cycle Continuous   Target Pressure (mmHg) 125   Canister changed? Yes   Dressing Status Old drainage noted   Dressing Changed Other (Comment)  (NPWT on hold)   Drainage Amount Large   Drainage Description Serous;Clear   Dressing Change Due 04/03/24   Output (ml) 150 ml   Wound Assessment Granulation tissue;Slough   Maria M-wound Assessment Excoriated  (Black Foam was directly on intact skin)   Odor None   Wound 04/03/24 Knee Left;Anterior #1   Date First Assessed/Time First Assessed: 04/03/24 1129   Present on Original Admission: Yes  Wound Approximate Age at First Assessment (Weeks): 3.5 weeks  Primary Wound Type: Surgical Type  Location: Knee  Wound Location Orientation: Left;Anterior  Wo...   Wound Image    Wound Etiology Non-Healing Surgical   Dressing Status Other (Comment)  (Flange disrupted)   Wound Cleansed Cleansed with saline;Vashe   Dressing/Treatment Negative pressure wound therapy   Wound Length (cm) 1.4 cm   Wound Width (cm) 1.2 cm   Wound Depth (cm) 0.4 cm   Wound Surface Area (cm^2) 1.68 cm^2   Wound Volume (cm^3) 0.672 cm^3   Distance Tunneling (cm) 5.2 cm   Tunneling Position ___ O'Clock   (wound tunnels at several points circumferentially, with greatest depth of 5.2cm)   Wound Assessment Pink/red   Drainage Description Serous;Clear  (Synovial Fluid)   Odor None   Maria M-wound Assessment Excoriated   Wound Thickness Description not for Pressure Injury Full thickness   Pain Assessment   Pain Assessment 0-10   Pain Level 3   Mark-Baker Pain Rating 0   Pain Location Knee   Pain Orientation Left;Anterior     Patient  takes ASA

## 2024-04-03 NOTE — DISCHARGE INSTRUCTIONS
NPWT on HOLD for 1 week.    NPWT on HOLD for 1 week.    Left Knee:  Cleanse wound with Normal Saline.  Vashe moistened gauze to wound bed and periwound for 1 minute   Aquacel Ag to wound bed.  Cover with ABD  Wrap with Kerlix.  Dressing change 1 - 2 x daily as needed.  Follow dressing with Tubigrip.  Follow with Immobilizer, as prescribed by your surgeon.    Home Health for dressing changes 2x each week. Patient to change at all other times. Please order enough supplies for patient to keep wound dressing dry and intact.    Compression: Tubigrip from base of toes to mid thigh using E, F, and G to graduate the compression appropriately.  Elevate lower legs when not ambulating. Activity is beneficial.  Protein 100 gms daily. Intake throughout the day.     Continue IV antibiotics - home self-administration - as prescribed by surgeon

## 2024-04-04 ENCOUNTER — HOSPITAL ENCOUNTER (OUTPATIENT)
Dept: WOUND CARE | Age: 64
Discharge: HOME | End: 2024-04-04
Payer: COMMERCIAL

## 2024-04-04 ENCOUNTER — OFFICE VISIT (OUTPATIENT)
Dept: ORTHOPEDIC SURGERY | Age: 64
End: 2024-04-04

## 2024-04-04 VITALS
DIASTOLIC BLOOD PRESSURE: 75 MMHG | SYSTOLIC BLOOD PRESSURE: 136 MMHG | TEMPERATURE: 97.4 F | RESPIRATION RATE: 18 BRPM | HEART RATE: 71 BPM

## 2024-04-04 DIAGNOSIS — Z96.652 STATUS POST LEFT KNEE REPLACEMENT: Primary | ICD-10-CM

## 2024-04-04 DIAGNOSIS — S81.002A OPEN WOUND OF LEFT KNEE, INITIAL ENCOUNTER: Primary | ICD-10-CM

## 2024-04-04 PROCEDURE — 99213 OFFICE O/P EST LOW 20 MIN: CPT

## 2024-04-04 RX ORDER — LIDOCAINE 50 MG/G
OINTMENT TOPICAL ONCE
OUTPATIENT
Start: 2024-04-04 | End: 2024-04-04

## 2024-04-04 RX ORDER — BACITRACIN ZINC AND POLYMYXIN B SULFATE 500; 1000 [USP'U]/G; [USP'U]/G
OINTMENT TOPICAL ONCE
OUTPATIENT
Start: 2024-04-04 | End: 2024-04-04

## 2024-04-04 RX ORDER — IBUPROFEN 200 MG
TABLET ORAL ONCE
OUTPATIENT
Start: 2024-04-04 | End: 2024-04-04

## 2024-04-04 RX ORDER — LIDOCAINE HYDROCHLORIDE 40 MG/ML
SOLUTION TOPICAL ONCE
OUTPATIENT
Start: 2024-04-04 | End: 2024-04-04

## 2024-04-04 RX ORDER — BETAMETHASONE DIPROPIONATE 0.5 MG/G
CREAM TOPICAL ONCE
OUTPATIENT
Start: 2024-04-04 | End: 2024-04-04

## 2024-04-04 RX ORDER — SODIUM CHLOR/HYPOCHLOROUS ACID 0.033 %
SOLUTION, IRRIGATION IRRIGATION ONCE
OUTPATIENT
Start: 2024-04-04 | End: 2024-04-04

## 2024-04-04 RX ORDER — LIDOCAINE HYDROCHLORIDE 20 MG/ML
JELLY TOPICAL ONCE
OUTPATIENT
Start: 2024-04-04 | End: 2024-04-04

## 2024-04-04 RX ORDER — CLOBETASOL PROPIONATE 0.5 MG/G
OINTMENT TOPICAL ONCE
OUTPATIENT
Start: 2024-04-04 | End: 2024-04-04

## 2024-04-04 RX ORDER — GINSENG 100 MG
CAPSULE ORAL ONCE
OUTPATIENT
Start: 2024-04-04 | End: 2024-04-04

## 2024-04-04 RX ORDER — SODIUM CHLOR/HYPOCHLOROUS ACID 0.033 %
SOLUTION, IRRIGATION IRRIGATION ONCE
Status: CANCELLED | OUTPATIENT
Start: 2024-04-04 | End: 2024-04-04

## 2024-04-04 RX ORDER — TRIAMCINOLONE ACETONIDE 1 MG/G
OINTMENT TOPICAL ONCE
OUTPATIENT
Start: 2024-04-04 | End: 2024-04-04

## 2024-04-04 RX ORDER — LIDOCAINE 40 MG/G
CREAM TOPICAL ONCE
OUTPATIENT
Start: 2024-04-04 | End: 2024-04-04

## 2024-04-04 RX ORDER — GENTAMICIN SULFATE 1 MG/G
OINTMENT TOPICAL ONCE
OUTPATIENT
Start: 2024-04-04 | End: 2024-04-04

## 2024-04-04 RX ORDER — SODIUM CHLOR/HYPOCHLOROUS ACID 0.033 %
SOLUTION, IRRIGATION IRRIGATION ONCE
Status: COMPLETED | OUTPATIENT
Start: 2024-04-04 | End: 2024-04-04

## 2024-04-04 RX ADMIN — Medication: at 14:00

## 2024-04-04 NOTE — FLOWSHEET NOTE
04/04/24 1356   Wound 04/03/24 Knee Left;Anterior #1   Date First Assessed/Time First Assessed: 04/03/24 1129   Present on Original Admission: Yes  Wound Approximate Age at First Assessment (Weeks): 3.5 weeks  Primary Wound Type: Surgical Type  Location: Knee  Wound Location Orientation: Left;Anterior  Wo...   Wound Image    Wound Etiology Non-Healing Surgical   Dressing Status Other (Comment)  (old dressing displaced distally off wound)   Wound Cleansed Cleansed with saline;Vashe   Dressing/Treatment Alginate with Ag;Other (comment)  (tubular compression)   Wound Length (cm) 1.4 cm   Wound Width (cm) 1.5 cm   Wound Depth (cm) 0.4 cm   Wound Surface Area (cm^2) 2.1 cm^2   Change in Wound Size % (l*w) -25   Wound Volume (cm^3) 0.84 cm^3   Wound Healing % -25   Distance Tunneling (cm) 5.2 cm   Wound Assessment Pink/red   Drainage Amount Large (50-75% saturated)   Drainage Description Serous;Yellow   Maria M-wound Assessment Excoriated   Margins Defined edges   Wound Thickness Description not for Pressure Injury Full thickness   Pain Assessment   Pain Assessment None - Denies Pain     Patient sent to wound clinic from ortho appointment for dressing to be replaced.

## 2024-04-04 NOTE — PROGRESS NOTES
Name: Gilbert Yo  YOB: 1960  Gender: male  MRN: [id number]    CC: Follow-up (Wound check )       HPI: Gilbert Yo is a 64 y.o. male who presents with Follow-up (Wound check )  Patient returns today for follow-up of 1 stage revision for infected total knee arthroplasty.  He has been seen by wound therapy for the central portion of the incision which not healing.  He is still receiving IV antibiotics.  His most recent laboratory work revealed that the sed rate and C-reactive protein have returned to normal.  Patient is experiencing very little discomfort and pain in the left knee.    History was obtained by patient    ROS/Meds/PSH/PMH/FH/SH: I personally reviewed the patients standard intake form.      Current Outpatient Medications:     busPIRone (BUSPAR) 10 MG tablet, Take 10 mg by mouth nightly. Take one to two tablets at night for sleep, Disp: , Rfl:     QUEtiapine (SEROQUEL) 50 MG tablet, Take 50 mg by mouth nightly. take one at night, Disp: , Rfl:     CEFAZOLIN IN SODIUM CHLORIDE IV, 8 g by Intravenous (Continuous Infusion) route continuous. 8 g over 24 hours continuous infusion, Disp: , Rfl:     celecoxib (CELEBREX) 200 MG capsule, Take 1 capsule by mouth 2 times daily, Disp: 60 capsule, Rfl: 1    irbesartan (AVAPRO) 300 MG tablet, Take 300 mg by mouth daily., Disp: , Rfl:     buPROPion (WELLBUTRIN SR) 150 MG extended release tablet, Take 150 mg by mouth daily., Disp: , Rfl:     celecoxib (CELEBREX) 200 MG capsule, Take 200 mg by mouth 2 times daily., Disp: , Rfl:     vitamin D (VITAMIN D3) 125 MCG (5000 UT) CAPS capsule, Take 1 capsule by mouth daily., Disp: , Rfl:     Sodium Chloride Flush (SALINE FLUSH IV), Infuse 10 mLs intravenously daily., Disp: , Rfl:     Heparin Sod, Pork, Lock Flush (HEPARIN, PORCINE, LOCK FLUSH IV), Infuse 5 mLs intravenously daily., Disp: , Rfl:     sodium chloride 0.9 % SOLN 100 mL with ceFAZolin 1 g SOLR 2,000 mg, Infuse 6 g intravenously continuous. infuse

## 2024-04-04 NOTE — PROGRESS NOTES
thigh using E, F, and G to graduate the compression appropriately.  Elevate lower legs when not ambulating. Activity is beneficial.  Protein 100 gms daily. Intake throughout the day.      Continue IV antibiotics - home self-administration - as prescribed by surgeon       Other diagnoses or problems addressed:      Pertinent labs reviewed.   Review of medical records and external note (s) from other providers was done for this visit.    New lab or imaging orders placed:  No new labs today.  Recent CRP and sed rate have been improving significantly.    Prescription drug management: N/A     Risk of complications and/or mortality of patient management:  This patient has a low risk of morbidity and mortality from additional diagnostic testing or treatment. This is due to the above conditions affecting wound healing as well as patient and procedure risk factors. Education and discussion held with patient regarding these disease processes pertinent to wound(s).  Other pertinent decisions include: minor surgery or procedures as below.  The patient's diagnosis or treatment is  significantly limited by social determinants of health as noted by: nutritional resource limitations .    Discussion of management or test interpretation with another provider.    Time spent with patient and patient care issues above the usual time needed for wound assessment and treatment was: [] 15-20 min  [] 21-30 min  [] 31-44 min  [] 45 min or more  This included time retrieving and reviewing records with patient and education provided to patient regarding disease process(es), offloading or pressure relief, nutrition needed for wound healing, smoking cessation when applicable, and infection risk.    This time also included physician non-face-to-face service time visit on the date of service such as  Preparing to see the patient (eg, review of tests)  Obtaining and/or reviewing separately obtained history  Performing a medically necessary

## 2024-04-05 ENCOUNTER — HOME CARE VISIT (OUTPATIENT)
Dept: SCHEDULING | Facility: HOME HEALTH | Age: 64
End: 2024-04-05
Payer: COMMERCIAL

## 2024-04-05 VITALS
TEMPERATURE: 98.6 F | SYSTOLIC BLOOD PRESSURE: 128 MMHG | HEART RATE: 68 BPM | OXYGEN SATURATION: 97 % | DIASTOLIC BLOOD PRESSURE: 60 MMHG

## 2024-04-05 VITALS
RESPIRATION RATE: 16 BRPM | DIASTOLIC BLOOD PRESSURE: 64 MMHG | SYSTOLIC BLOOD PRESSURE: 128 MMHG | HEART RATE: 64 BPM | TEMPERATURE: 97.7 F | OXYGEN SATURATION: 98 %

## 2024-04-05 PROCEDURE — G0299 HHS/HOSPICE OF RN EA 15 MIN: HCPCS

## 2024-04-05 PROCEDURE — G0151 HHCP-SERV OF PT,EA 15 MIN: HCPCS

## 2024-04-05 ASSESSMENT — ENCOUNTER SYMPTOMS
STOOL DESCRIPTION: SOFT FORMED
PAIN LOCATION - PAIN QUALITY: ACHING
ABDOMINAL PAIN: 1

## 2024-04-08 ENCOUNTER — HOSPITAL ENCOUNTER (OUTPATIENT)
Age: 64
Setting detail: SPECIMEN
Discharge: HOME | End: 2024-04-11
Payer: COMMERCIAL

## 2024-04-08 ENCOUNTER — HOME CARE VISIT (OUTPATIENT)
Dept: SCHEDULING | Facility: HOME HEALTH | Age: 64
End: 2024-04-08
Payer: COMMERCIAL

## 2024-04-08 VITALS
RESPIRATION RATE: 16 BRPM | OXYGEN SATURATION: 97 % | DIASTOLIC BLOOD PRESSURE: 68 MMHG | HEART RATE: 74 BPM | TEMPERATURE: 98.3 F | SYSTOLIC BLOOD PRESSURE: 132 MMHG

## 2024-04-08 LAB
BASOPHILS # BLD: 0 K/UL (ref 0–0.2)
BASOPHILS NFR BLD: 0 % (ref 0–2)
CREAT SERPL-MCNC: 1.04 MG/DL (ref 0.8–1.5)
CRP SERPL-MCNC: <0.3 MG/DL (ref 0–0.9)
DIFFERENTIAL METHOD BLD: ABNORMAL
EOSINOPHIL # BLD: 0.3 K/UL (ref 0–0.8)
EOSINOPHIL NFR BLD: 5 % (ref 0.5–7.8)
ERYTHROCYTE [DISTWIDTH] IN BLOOD BY AUTOMATED COUNT: 14.3 % (ref 11.9–14.6)
HCT VFR BLD AUTO: 33.6 % (ref 41.1–50.3)
HGB BLD-MCNC: 10.5 G/DL (ref 13.6–17.2)
IMM GRANULOCYTES # BLD AUTO: 0 K/UL (ref 0–0.5)
IMM GRANULOCYTES NFR BLD AUTO: 0 % (ref 0–5)
LYMPHOCYTES # BLD: 1.2 K/UL (ref 0.5–4.6)
LYMPHOCYTES NFR BLD: 24 % (ref 13–44)
MCH RBC QN AUTO: 29.7 PG (ref 26.1–32.9)
MCHC RBC AUTO-ENTMCNC: 31.3 G/DL (ref 31.4–35)
MCV RBC AUTO: 94.9 FL (ref 82–102)
MONOCYTES # BLD: 0.6 K/UL (ref 0.1–1.3)
MONOCYTES NFR BLD: 12 % (ref 4–12)
NEUTS SEG # BLD: 2.8 K/UL (ref 1.7–8.2)
NEUTS SEG NFR BLD: 58 % (ref 43–78)
NRBC # BLD: 0 K/UL (ref 0–0.2)
PLATELET # BLD AUTO: 193 K/UL (ref 150–450)
PMV BLD AUTO: 12.6 FL (ref 9.4–12.3)
RBC # BLD AUTO: 3.54 M/UL (ref 4.23–5.6)
WBC # BLD AUTO: 4.8 K/UL (ref 4.3–11.1)

## 2024-04-08 PROCEDURE — 85025 COMPLETE CBC W/AUTO DIFF WBC: CPT

## 2024-04-08 PROCEDURE — G0299 HHS/HOSPICE OF RN EA 15 MIN: HCPCS

## 2024-04-08 PROCEDURE — 82565 ASSAY OF CREATININE: CPT

## 2024-04-08 PROCEDURE — 86140 C-REACTIVE PROTEIN: CPT

## 2024-04-08 ASSESSMENT — ENCOUNTER SYMPTOMS: STOOL DESCRIPTION: SOFT FORMED

## 2024-04-10 ENCOUNTER — HOME CARE VISIT (OUTPATIENT)
Dept: HOME HEALTH SERVICES | Facility: HOME HEALTH | Age: 64
End: 2024-04-10
Payer: COMMERCIAL

## 2024-04-10 ENCOUNTER — HOSPITAL ENCOUNTER (OUTPATIENT)
Dept: WOUND CARE | Age: 64
Discharge: HOME | End: 2024-04-10
Payer: COMMERCIAL

## 2024-04-10 ENCOUNTER — HOME CARE VISIT (OUTPATIENT)
Dept: SCHEDULING | Facility: HOME HEALTH | Age: 64
End: 2024-04-10
Payer: COMMERCIAL

## 2024-04-10 VITALS
HEART RATE: 70 BPM | WEIGHT: 302 LBS | DIASTOLIC BLOOD PRESSURE: 72 MMHG | RESPIRATION RATE: 16 BRPM | BODY MASS INDEX: 38.76 KG/M2 | SYSTOLIC BLOOD PRESSURE: 145 MMHG | TEMPERATURE: 96 F | HEIGHT: 74 IN

## 2024-04-10 DIAGNOSIS — S81.002A OPEN WOUND OF LEFT KNEE, INITIAL ENCOUNTER: Primary | ICD-10-CM

## 2024-04-10 PROCEDURE — 11042 DBRDMT SUBQ TIS 1ST 20SQCM/<: CPT

## 2024-04-10 RX ORDER — LIDOCAINE HYDROCHLORIDE 40 MG/ML
SOLUTION TOPICAL ONCE
OUTPATIENT
Start: 2024-04-10 | End: 2024-04-10

## 2024-04-10 RX ORDER — BETAMETHASONE DIPROPIONATE 0.5 MG/G
CREAM TOPICAL ONCE
OUTPATIENT
Start: 2024-04-10 | End: 2024-04-10

## 2024-04-10 RX ORDER — LIDOCAINE HYDROCHLORIDE 20 MG/ML
JELLY TOPICAL ONCE
OUTPATIENT
Start: 2024-04-10 | End: 2024-04-10

## 2024-04-10 RX ORDER — GINSENG 100 MG
CAPSULE ORAL ONCE
OUTPATIENT
Start: 2024-04-10 | End: 2024-04-10

## 2024-04-10 RX ORDER — LIDOCAINE 50 MG/G
OINTMENT TOPICAL ONCE
OUTPATIENT
Start: 2024-04-10 | End: 2024-04-10

## 2024-04-10 RX ORDER — BACITRACIN ZINC AND POLYMYXIN B SULFATE 500; 1000 [USP'U]/G; [USP'U]/G
OINTMENT TOPICAL ONCE
OUTPATIENT
Start: 2024-04-10 | End: 2024-04-10

## 2024-04-10 RX ORDER — LIDOCAINE 40 MG/G
CREAM TOPICAL ONCE
OUTPATIENT
Start: 2024-04-10 | End: 2024-04-10

## 2024-04-10 RX ORDER — GENTAMICIN SULFATE 1 MG/G
OINTMENT TOPICAL ONCE
OUTPATIENT
Start: 2024-04-10 | End: 2024-04-10

## 2024-04-10 RX ORDER — CLOBETASOL PROPIONATE 0.5 MG/G
OINTMENT TOPICAL ONCE
OUTPATIENT
Start: 2024-04-10 | End: 2024-04-10

## 2024-04-10 RX ORDER — SODIUM CHLOR/HYPOCHLOROUS ACID 0.033 %
SOLUTION, IRRIGATION IRRIGATION ONCE
OUTPATIENT
Start: 2024-04-10 | End: 2024-04-10

## 2024-04-10 RX ORDER — IBUPROFEN 200 MG
TABLET ORAL ONCE
OUTPATIENT
Start: 2024-04-10 | End: 2024-04-10

## 2024-04-10 RX ORDER — TRIAMCINOLONE ACETONIDE 1 MG/G
OINTMENT TOPICAL ONCE
OUTPATIENT
Start: 2024-04-10 | End: 2024-04-10

## 2024-04-10 NOTE — FLOWSHEET NOTE
04/10/24 1557   Wound 04/03/24 Knee Left;Anterior #1   Date First Assessed/Time First Assessed: 04/03/24 1129   Present on Original Admission: Yes  Wound Approximate Age at First Assessment (Weeks): 3.5 weeks  Primary Wound Type: Surgical Type  Location: Knee  Wound Location Orientation: Left;Anterior  Wo...   Wound Etiology Non-Healing Surgical   Dressing Status Old drainage noted   Wound Cleansed Cleansed with saline   Dressing/Treatment ABD;Alginate with Ag;Roll gauze;Other (comment)   Wound Length (cm) 1.4 cm   Wound Width (cm) 1.3 cm   Wound Depth (cm) 0.2 cm   Wound Surface Area (cm^2) 1.82 cm^2   Change in Wound Size % (l*w) -8.33   Wound Volume (cm^3) 0.364 cm^3   Wound Healing % 46   Wound Assessment Pink/red   Drainage Amount Moderate (25-50%)   Drainage Description Serous;Yellow   Maria M-wound Assessment Intact   Margins Defined edges   Wound Thickness Description not for Pressure Injury Full thickness

## 2024-04-12 ENCOUNTER — HOME CARE VISIT (OUTPATIENT)
Dept: SCHEDULING | Facility: HOME HEALTH | Age: 64
End: 2024-04-12
Payer: COMMERCIAL

## 2024-04-12 PROCEDURE — G0299 HHS/HOSPICE OF RN EA 15 MIN: HCPCS

## 2024-04-15 ENCOUNTER — HOSPITAL ENCOUNTER (OUTPATIENT)
Dept: LAB | Age: 64
Discharge: HOME | End: 2024-04-18
Payer: COMMERCIAL

## 2024-04-15 ENCOUNTER — HOME CARE VISIT (OUTPATIENT)
Dept: SCHEDULING | Facility: HOME HEALTH | Age: 64
End: 2024-04-15
Payer: COMMERCIAL

## 2024-04-15 VITALS
DIASTOLIC BLOOD PRESSURE: 70 MMHG | RESPIRATION RATE: 14 BRPM | SYSTOLIC BLOOD PRESSURE: 128 MMHG | OXYGEN SATURATION: 99 % | HEART RATE: 62 BPM | TEMPERATURE: 98.2 F

## 2024-04-15 LAB
BASOPHILS # BLD: 0 K/UL (ref 0–0.2)
BASOPHILS NFR BLD: 0 % (ref 0–2)
CREAT SERPL-MCNC: 0.94 MG/DL (ref 0.8–1.5)
CRP SERPL-MCNC: <0.3 MG/DL (ref 0–0.9)
DIFFERENTIAL METHOD BLD: ABNORMAL
EOSINOPHIL # BLD: 0.4 K/UL (ref 0–0.8)
EOSINOPHIL NFR BLD: 9 % (ref 0.5–7.8)
ERYTHROCYTE [DISTWIDTH] IN BLOOD BY AUTOMATED COUNT: 14.5 % (ref 11.9–14.6)
ERYTHROCYTE [SEDIMENTATION RATE] IN BLOOD: 15 MM/HR (ref 0–15)
FUNGAL CULT/SMEAR: NORMAL
FUNGUS (MYCOLOGY) CULTURE: NORMAL
FUNGUS SMEAR: NORMAL
HCT VFR BLD AUTO: 33.2 % (ref 41.1–50.3)
HGB BLD-MCNC: 10.7 G/DL (ref 13.6–17.2)
IMM GRANULOCYTES # BLD AUTO: 0 K/UL (ref 0–0.5)
IMM GRANULOCYTES NFR BLD AUTO: 0 % (ref 0–5)
LYMPHOCYTES # BLD: 1.2 K/UL (ref 0.5–4.6)
LYMPHOCYTES NFR BLD: 26 % (ref 13–44)
MCH RBC QN AUTO: 29.6 PG (ref 26.1–32.9)
MCHC RBC AUTO-ENTMCNC: 32.2 G/DL (ref 31.4–35)
MCV RBC AUTO: 92 FL (ref 82–102)
MONOCYTES # BLD: 0.5 K/UL (ref 0.1–1.3)
MONOCYTES NFR BLD: 11 % (ref 4–12)
NEUTS SEG # BLD: 2.4 K/UL (ref 1.7–8.2)
NEUTS SEG NFR BLD: 53 % (ref 43–78)
NRBC # BLD: 0 K/UL (ref 0–0.2)
PLATELET # BLD AUTO: 194 K/UL (ref 150–450)
PMV BLD AUTO: 12 FL (ref 9.4–12.3)
RBC # BLD AUTO: 3.61 M/UL (ref 4.23–5.6)
REFLEX TO ID: NORMAL
SPECIMEN PROCESSING: NORMAL
SPECIMEN SOURCE: NORMAL
WBC # BLD AUTO: 4.5 K/UL (ref 4.3–11.1)

## 2024-04-15 PROCEDURE — 86140 C-REACTIVE PROTEIN: CPT

## 2024-04-15 PROCEDURE — 85027 COMPLETE CBC AUTOMATED: CPT

## 2024-04-15 PROCEDURE — 82565 ASSAY OF CREATININE: CPT

## 2024-04-15 PROCEDURE — 85652 RBC SED RATE AUTOMATED: CPT

## 2024-04-15 PROCEDURE — G0299 HHS/HOSPICE OF RN EA 15 MIN: HCPCS

## 2024-04-15 ASSESSMENT — ENCOUNTER SYMPTOMS: STOOL DESCRIPTION: SOFT FORMED

## 2024-04-16 ENCOUNTER — TELEPHONE (OUTPATIENT)
Dept: ORTHOPEDIC SURGERY | Age: 64
End: 2024-04-16

## 2024-04-16 NOTE — TELEPHONE ENCOUNTER
He needs to speak to someone about the pump he has and if he is supposed to continue it. His next appt with us is on the 25th.

## 2024-04-16 NOTE — TELEPHONE ENCOUNTER
Patient is wondering when his IV antibiotics will be discontinued. I told him that infectious disease would determine this. He has an appointment soon to follow up with them. Pt says that wound care discontinued his wound vac and is happy with the progress of his surgical site.

## 2024-04-17 ENCOUNTER — HOSPITAL ENCOUNTER (OUTPATIENT)
Dept: WOUND CARE | Age: 64
Discharge: HOME | End: 2024-04-17
Payer: COMMERCIAL

## 2024-04-17 VITALS
HEIGHT: 74 IN | BODY MASS INDEX: 39.91 KG/M2 | HEART RATE: 71 BPM | DIASTOLIC BLOOD PRESSURE: 70 MMHG | RESPIRATION RATE: 18 BRPM | WEIGHT: 311 LBS | OXYGEN SATURATION: 96 % | TEMPERATURE: 99 F | SYSTOLIC BLOOD PRESSURE: 137 MMHG

## 2024-04-17 DIAGNOSIS — S81.002A OPEN WOUND OF LEFT KNEE, INITIAL ENCOUNTER: Primary | ICD-10-CM

## 2024-04-17 PROCEDURE — 11042 DBRDMT SUBQ TIS 1ST 20SQCM/<: CPT

## 2024-04-17 RX ORDER — CLOBETASOL PROPIONATE 0.5 MG/G
OINTMENT TOPICAL ONCE
OUTPATIENT
Start: 2024-04-17 | End: 2024-04-17

## 2024-04-17 RX ORDER — LIDOCAINE HYDROCHLORIDE 20 MG/ML
JELLY TOPICAL ONCE
Status: COMPLETED | OUTPATIENT
Start: 2024-04-17 | End: 2024-04-17

## 2024-04-17 RX ORDER — IBUPROFEN 200 MG
TABLET ORAL ONCE
OUTPATIENT
Start: 2024-04-17 | End: 2024-04-17

## 2024-04-17 RX ORDER — LIDOCAINE 40 MG/G
CREAM TOPICAL ONCE
OUTPATIENT
Start: 2024-04-17 | End: 2024-04-17

## 2024-04-17 RX ORDER — LIDOCAINE 50 MG/G
OINTMENT TOPICAL ONCE
OUTPATIENT
Start: 2024-04-17 | End: 2024-04-17

## 2024-04-17 RX ORDER — LIDOCAINE HYDROCHLORIDE 20 MG/ML
JELLY TOPICAL ONCE
OUTPATIENT
Start: 2024-04-17 | End: 2024-04-17

## 2024-04-17 RX ORDER — GINSENG 100 MG
CAPSULE ORAL ONCE
OUTPATIENT
Start: 2024-04-17 | End: 2024-04-17

## 2024-04-17 RX ORDER — SODIUM CHLOR/HYPOCHLOROUS ACID 0.033 %
SOLUTION, IRRIGATION IRRIGATION ONCE
OUTPATIENT
Start: 2024-04-17 | End: 2024-04-17

## 2024-04-17 RX ORDER — BACITRACIN ZINC AND POLYMYXIN B SULFATE 500; 1000 [USP'U]/G; [USP'U]/G
OINTMENT TOPICAL ONCE
OUTPATIENT
Start: 2024-04-17 | End: 2024-04-17

## 2024-04-17 RX ORDER — BETAMETHASONE DIPROPIONATE 0.5 MG/G
CREAM TOPICAL ONCE
OUTPATIENT
Start: 2024-04-17 | End: 2024-04-17

## 2024-04-17 RX ORDER — TRIAMCINOLONE ACETONIDE 1 MG/G
OINTMENT TOPICAL ONCE
OUTPATIENT
Start: 2024-04-17 | End: 2024-04-17

## 2024-04-17 RX ORDER — ZOLPIDEM TARTRATE 10 MG/1
TABLET ORAL
COMMUNITY
Start: 2024-04-16

## 2024-04-17 RX ORDER — LIDOCAINE HYDROCHLORIDE 40 MG/ML
SOLUTION TOPICAL ONCE
OUTPATIENT
Start: 2024-04-17 | End: 2024-04-17

## 2024-04-17 RX ORDER — GENTAMICIN SULFATE 1 MG/G
OINTMENT TOPICAL ONCE
OUTPATIENT
Start: 2024-04-17 | End: 2024-04-17

## 2024-04-17 RX ADMIN — LIDOCAINE HYDROCHLORIDE: 20 JELLY TOPICAL at 09:39

## 2024-04-17 NOTE — DISCHARGE INSTRUCTIONS
Return Appointment:   1 week with Abiel Nicholas DO     Left Knee:  Cleanse wound with Normal Saline.  Vashe moistened gauze to wound bed and periwound for 1 minute   Aquacel Ag to wound bed.  Cover with ABD  May secure with minimal tape as desired.Wrap with Kerlix.  Dressing change 1 - 2 x daily as needed.  Follow dressing with Tubigrip.  Wear knee immobilizer, as prescribed by your surgeon.     Home Health for dressing changes 2x each week. Patient to change at all other times. Please order enough supplies for patient to keep wound dressing dry and intact.  Compression: Tubigrip from base of toes to mid thigh using E, F, and G to graduate the compression appropriately.  Elevate lower legs above level of heart when not ambulating. Activity is beneficial.  Protein 100 gms daily. Intake throughout the day.   Continue IV antibiotics - home self-administration - as prescribed by Infectious Disease

## 2024-04-17 NOTE — FLOWSHEET NOTE
04/17/24 0931   Wound 04/03/24 Knee Left;Anterior #1   Date First Assessed/Time First Assessed: 04/03/24 1129   Present on Original Admission: Yes  Wound Approximate Age at First Assessment (Weeks): 3.5 weeks  Primary Wound Type: Surgical Type  Location: Knee  Wound Location Orientation: Left;Anterior  Wo...   Wound Image     Wound Etiology Non-Healing Surgical   Dressing Status Old drainage noted   Wound Cleansed Cleansed with saline   Dressing/Treatment Alginate with Ag;ABD   Wound Length (cm) 1 cm   Wound Width (cm) 1 cm   Wound Depth (cm) 0.5 cm   Wound Surface Area (cm^2) 1 cm^2   Change in Wound Size % (l*w) 40.48   Wound Volume (cm^3) 0.5 cm^3   Wound Healing % 26   Post-Procedure Length (cm) 1 cm   Post-Procedure Width (cm) 1 cm   Post-Procedure Depth (cm) 0.5 cm   Post-Procedure Surface Area (cm^2) 1 cm^2   Post-Procedure Volume (cm^3) 0.5 cm^3   Wound Assessment Granulation tissue;Slough   Drainage Amount Large (50-75% saturated)   Drainage Description Serous   Odor None   Maria M-wound Assessment Intact;Edematous   Wound Thickness Description not for Pressure Injury Full thickness     Post-debridement

## 2024-04-17 NOTE — WOUND CARE
Discharge Instructions for  [facility]  [address] [place] [address]  [address]  [place] [address]  Phone [phone]   Fax [phone]      NAME:  Gilbert Yo  YOB: 1960  MEDICAL RECORD NUMBER:  [id number]  DATE:  4/17/2024    Return Appointment:   1 week with Abiel Nicholas DO    Left Knee:  Cleanse wound with Normal Saline.  Vashe moistened gauze to wound bed and periwound for 1 minute   Aquacel Ag to wound bed.  Cover with ABD  May secure with minimal tape as desired.Wrap with Kerlix.  Dressing change 1 - 2 x daily as needed.  Follow dressing with Tubigrip.  Wear knee immobilizer, as prescribed by your surgeon.    Home Health for dressing changes 2x each week. Patient to change at all other times. Please order enough supplies for patient to keep wound dressing dry and intact.  Compression: Tubigrip from base of toes to mid thigh using E, F, and G to graduate the compression appropriately.  Elevate lower legs above level of heart when not ambulating. Activity is beneficial.  Protein 100 gms daily. Intake throughout the day.   Continue IV antibiotics - home self-administration - as prescribed by Infectious Disease        Should you experience increased redness, swelling, pain, foul odor, size of wound(s), or have a temperature over 101 degrees please contact the Wound Healing Center at [phone] or if after hours contact your primary care physician or go to the hospital emergency department.    PLEASE NOTE: IF YOU ARE UNABLE TO OBTAIN WOUND SUPPLIES, CONTINUE TO USE THE SUPPLIES YOU HAVE AVAILABLE UNTIL YOU ARE ABLE TO REACH US. IT IS MOST IMPORTANT TO KEEP THE WOUND COVERED AT ALL TIMES.    Electronically signed Kelly Kendall PT, WCC on 4/17/2024 at 9:46 AM

## 2024-04-19 ENCOUNTER — HOME CARE VISIT (OUTPATIENT)
Dept: SCHEDULING | Facility: HOME HEALTH | Age: 64
End: 2024-04-19
Payer: COMMERCIAL

## 2024-04-22 ENCOUNTER — HOME CARE VISIT (OUTPATIENT)
Dept: SCHEDULING | Facility: HOME HEALTH | Age: 64
End: 2024-04-22
Payer: COMMERCIAL

## 2024-04-22 ENCOUNTER — TELEPHONE (OUTPATIENT)
Dept: ORTHOPEDIC SURGERY | Age: 64
End: 2024-04-22

## 2024-04-22 VITALS
TEMPERATURE: 98.3 F | SYSTOLIC BLOOD PRESSURE: 130 MMHG | DIASTOLIC BLOOD PRESSURE: 68 MMHG | HEART RATE: 70 BPM | OXYGEN SATURATION: 97 % | RESPIRATION RATE: 16 BRPM

## 2024-04-22 DIAGNOSIS — Z96.652 STATUS POST LEFT KNEE REPLACEMENT: Primary | ICD-10-CM

## 2024-04-22 PROCEDURE — G0299 HHS/HOSPICE OF RN EA 15 MIN: HCPCS

## 2024-04-22 ASSESSMENT — ENCOUNTER SYMPTOMS
DYSPNEA ACTIVITY LEVEL: AFTER AMBULATING MORE THAN 20 FT
STOOL DESCRIPTION: SOFT FORMED

## 2024-04-22 NOTE — TELEPHONE ENCOUNTER
Home Care called and suggests pt can begin outpatient physical therapy. You can reach them at [phone].

## 2024-04-23 ENCOUNTER — TELEPHONE (OUTPATIENT)
Dept: WOUND CARE | Age: 64
End: 2024-04-23

## 2024-04-23 NOTE — TELEPHONE ENCOUNTER
Pt's HH RN Ruthy states that patient is not home bound and will need to discharge from HH services next week, need to address pt performing his own wound care and supplies at next visit.

## 2024-04-24 ENCOUNTER — HOSPITAL ENCOUNTER (OUTPATIENT)
Dept: WOUND CARE | Age: 64
Discharge: HOME | End: 2024-04-24
Payer: COMMERCIAL

## 2024-04-24 VITALS
BODY MASS INDEX: 39.66 KG/M2 | TEMPERATURE: 97.8 F | RESPIRATION RATE: 18 BRPM | SYSTOLIC BLOOD PRESSURE: 148 MMHG | HEIGHT: 74 IN | WEIGHT: 309 LBS | HEART RATE: 74 BPM | DIASTOLIC BLOOD PRESSURE: 68 MMHG

## 2024-04-24 DIAGNOSIS — S81.002A OPEN WOUND OF LEFT KNEE, INITIAL ENCOUNTER: Primary | ICD-10-CM

## 2024-04-24 PROCEDURE — 11042 DBRDMT SUBQ TIS 1ST 20SQCM/<: CPT

## 2024-04-24 RX ORDER — LIDOCAINE 50 MG/G
OINTMENT TOPICAL ONCE
OUTPATIENT
Start: 2024-04-24 | End: 2024-04-24

## 2024-04-24 RX ORDER — SODIUM CHLOR/HYPOCHLOROUS ACID 0.033 %
SOLUTION, IRRIGATION IRRIGATION ONCE
OUTPATIENT
Start: 2024-04-24 | End: 2024-04-24

## 2024-04-24 RX ORDER — LIDOCAINE 40 MG/G
CREAM TOPICAL ONCE
OUTPATIENT
Start: 2024-04-24 | End: 2024-04-24

## 2024-04-24 RX ORDER — CLOBETASOL PROPIONATE 0.5 MG/G
OINTMENT TOPICAL ONCE
OUTPATIENT
Start: 2024-04-24 | End: 2024-04-24

## 2024-04-24 RX ORDER — TRIAMCINOLONE ACETONIDE 1 MG/G
OINTMENT TOPICAL ONCE
OUTPATIENT
Start: 2024-04-24 | End: 2024-04-24

## 2024-04-24 RX ORDER — LIDOCAINE HYDROCHLORIDE 40 MG/ML
SOLUTION TOPICAL ONCE
OUTPATIENT
Start: 2024-04-24 | End: 2024-04-24

## 2024-04-24 RX ORDER — LIDOCAINE HYDROCHLORIDE 20 MG/ML
JELLY TOPICAL ONCE
OUTPATIENT
Start: 2024-04-24 | End: 2024-04-24

## 2024-04-24 RX ORDER — BETAMETHASONE DIPROPIONATE 0.5 MG/G
CREAM TOPICAL ONCE
OUTPATIENT
Start: 2024-04-24 | End: 2024-04-24

## 2024-04-24 RX ORDER — GENTAMICIN SULFATE 1 MG/G
OINTMENT TOPICAL ONCE
OUTPATIENT
Start: 2024-04-24 | End: 2024-04-24

## 2024-04-24 RX ORDER — IBUPROFEN 200 MG
TABLET ORAL ONCE
OUTPATIENT
Start: 2024-04-24 | End: 2024-04-24

## 2024-04-24 RX ORDER — LIDOCAINE HYDROCHLORIDE 20 MG/ML
JELLY TOPICAL ONCE
Status: COMPLETED | OUTPATIENT
Start: 2024-04-24 | End: 2024-04-24

## 2024-04-24 RX ORDER — BACITRACIN ZINC AND POLYMYXIN B SULFATE 500; 1000 [USP'U]/G; [USP'U]/G
OINTMENT TOPICAL ONCE
OUTPATIENT
Start: 2024-04-24 | End: 2024-04-24

## 2024-04-24 RX ORDER — GINSENG 100 MG
CAPSULE ORAL ONCE
OUTPATIENT
Start: 2024-04-24 | End: 2024-04-24

## 2024-04-24 RX ADMIN — LIDOCAINE HYDROCHLORIDE: 20 JELLY TOPICAL at 10:04

## 2024-04-24 NOTE — WOUND CARE
Discharge Instructions for  [facility]  [address] [place] [address]  [address]  [place] [address]  Phone [phone]   Fax [phone]      NAME:  Gilbert Yo  YOB: 1960  MEDICAL RECORD NUMBER:  [id number]  DATE:  4/24/2024    Return Appointment:   1 week with Abiel Nicholas DO    Left Knee:  Cleanse wound with Normal Saline.  Vashe moistened gauze to wound bed and periwound for 1 minute   Aquacel Ag to wound bed.  Cover with ABD  May secure with minimal tape as desired.Wrap with Kerlix.  Dressing change 1 - 2 x daily as needed.  Follow dressing with Tubigrip.  Wear knee immobilizer, as prescribed by your surgeon.    Home Health for dressing changes 2x each week. Patient to change at all other times. Please order enough supplies for patient to keep wound dressing dry and intact.  Compression: Tubigrip from base of toes to mid thigh using E, F, and G to graduate the compression appropriately.  Elevate lower legs above level of heart when not ambulating. Activity is beneficial.  Protein 100 gms daily. Intake throughout the day.     Should you experience increased redness, swelling, pain, foul odor, size of wound(s), or have a temperature over 101 degrees please contact the Wound Healing Center at [phone] or if after hours contact your primary care physician or go to the hospital emergency department.    PLEASE NOTE: IF YOU ARE UNABLE TO OBTAIN WOUND SUPPLIES, CONTINUE TO USE THE SUPPLIES YOU HAVE AVAILABLE UNTIL YOU ARE ABLE TO REACH US. IT IS MOST IMPORTANT TO KEEP THE WOUND COVERED AT ALL TIMES.    Electronically signed Ce Ramirez RN, [facility] on 4/24/2024 at 9:47 AM

## 2024-04-24 NOTE — FLOWSHEET NOTE
04/24/24 0927   Left Leg Edema Point of Measurement   Leg circumference 48 cm   Ankle circumference 28 cm   Foot circumference 25 cm   Compression Therapy Tubular elastic support bandage   Wound 04/03/24 Knee Left;Anterior #1   Date First Assessed/Time First Assessed: 04/03/24 1129   Present on Original Admission: Yes  Wound Approximate Age at First Assessment (Weeks): 3.5 weeks  Primary Wound Type: Surgical Type  Location: Knee  Wound Location Orientation: Left;Anterior  Wo...   Wound Image     Wound Etiology Non-Healing Surgical   Dressing Status Old drainage noted   Wound Cleansed Cleansed with saline;Vashe   Dressing/Treatment ABD;Alginate with Ag;Roll gauze;Tape/Soft cloth adhesive tape;Tubular bandage   Wound Length (cm) 1 cm   Wound Width (cm) 1 cm   Wound Depth (cm) 0.3 cm   Wound Surface Area (cm^2) 1 cm^2   Change in Wound Size % (l*w) 40.48   Wound Volume (cm^3) 0.3 cm^3   Wound Healing % 55   Post-Procedure Length (cm) 1.1 cm   Post-Procedure Width (cm) 1.1 cm   Post-Procedure Depth (cm) 0.3 cm   Post-Procedure Surface Area (cm^2) 1.21 cm^2   Post-Procedure Volume (cm^3) 0.363 cm^3   Wound Assessment Granulation tissue;Slough   Drainage Amount Small (< 25%)   Drainage Description Serous   Odor None   Maria M-wound Assessment Edematous;Intact   Wound Thickness Description not for Pressure Injury Full thickness   Pain Assessment   Pain Assessment None - Denies Pain

## 2024-04-25 ENCOUNTER — OFFICE VISIT (OUTPATIENT)
Dept: ORTHOPEDIC SURGERY | Age: 64
End: 2024-04-25

## 2024-04-25 ENCOUNTER — HOME CARE VISIT (OUTPATIENT)
Dept: SCHEDULING | Facility: HOME HEALTH | Age: 64
End: 2024-04-25
Payer: COMMERCIAL

## 2024-04-25 DIAGNOSIS — Z96.652 STATUS POST LEFT KNEE REPLACEMENT: Primary | ICD-10-CM

## 2024-04-25 NOTE — PROGRESS NOTES
02/14/2023    Left Ventricle: Normal left ventricular systolic function with a est EF 55- 60%. Left ventricle size is nml. Mildly increased wall thickness. Nml wall motion. Abn diastolic function. Aortic Valve: Mild sclerosis of aortic valve cusp. Mild stenosis of aortic valve. AV mean gradient 9 mmHg.Mild sclerosis of aortic valve cusp.No regurgitation. Mild stenosis of aortic valve. AV mean gradient  9 mmHg    Insomnia     Morbid obesity (HCC) 5/31/16    BMI- 40 (verbal)    Murmur, cardiac     2 out of 6 systolic murmur heard over the right upper sternal border    OAB (overactive bladder)     Other and unspecified hyperlipidemia     Prediabetes     Metformin for prevention    Psychiatric disorder     depression/ anxiety    Syncope and collapse 02/25/2024        Physical Examination:  Dressing removed.  Incision healed except for the central portion which is packed and dressed.  There is no active drainage.  No surrounding erythema.  Minimal pain with range of motion.      Assessment:   The patient's infection appears to be resolved.  He will continue with wound care.  He will return here in 4 to 6 weeks.

## 2024-04-26 ENCOUNTER — HOME CARE VISIT (OUTPATIENT)
Dept: SCHEDULING | Facility: HOME HEALTH | Age: 64
End: 2024-04-26
Payer: COMMERCIAL

## 2024-04-26 ENCOUNTER — TELEPHONE (OUTPATIENT)
Dept: ORTHOPEDIC SURGERY | Age: 64
End: 2024-04-26

## 2024-04-26 NOTE — TELEPHONE ENCOUNTER
Left a detailed VM that patient does not have any restrictions just as tolerated also immobilizer was removed yesterday & is not required.

## 2024-04-29 ENCOUNTER — HOME CARE VISIT (OUTPATIENT)
Dept: SCHEDULING | Facility: HOME HEALTH | Age: 64
End: 2024-04-29
Payer: COMMERCIAL

## 2024-04-29 VITALS
DIASTOLIC BLOOD PRESSURE: 62 MMHG | RESPIRATION RATE: 16 BRPM | TEMPERATURE: 98.2 F | HEART RATE: 68 BPM | OXYGEN SATURATION: 98 % | SYSTOLIC BLOOD PRESSURE: 130 MMHG

## 2024-04-29 PROCEDURE — G0299 HHS/HOSPICE OF RN EA 15 MIN: HCPCS

## 2024-04-29 ASSESSMENT — ENCOUNTER SYMPTOMS
DYSPNEA ACTIVITY LEVEL: AFTER AMBULATING MORE THAN 20 FT
STOOL DESCRIPTION: SOFT FORMED

## 2024-05-01 ENCOUNTER — TELEPHONE (OUTPATIENT)
Dept: WOUND CARE | Age: 64
End: 2024-05-01

## 2024-05-01 ENCOUNTER — HOSPITAL ENCOUNTER (OUTPATIENT)
Dept: WOUND CARE | Age: 64
Discharge: HOME | End: 2024-05-01
Payer: COMMERCIAL

## 2024-05-01 VITALS
DIASTOLIC BLOOD PRESSURE: 65 MMHG | HEART RATE: 76 BPM | RESPIRATION RATE: 18 BRPM | HEIGHT: 74 IN | BODY MASS INDEX: 40.43 KG/M2 | OXYGEN SATURATION: 100 % | SYSTOLIC BLOOD PRESSURE: 126 MMHG | TEMPERATURE: 97.9 F | WEIGHT: 315 LBS

## 2024-05-01 DIAGNOSIS — S81.002A OPEN WOUND OF LEFT KNEE, INITIAL ENCOUNTER: Primary | ICD-10-CM

## 2024-05-01 PROCEDURE — 11042 DBRDMT SUBQ TIS 1ST 20SQCM/<: CPT

## 2024-05-01 RX ORDER — LIDOCAINE 50 MG/G
OINTMENT TOPICAL ONCE
OUTPATIENT
Start: 2024-05-01 | End: 2024-05-01

## 2024-05-01 RX ORDER — LIDOCAINE HYDROCHLORIDE 20 MG/ML
JELLY TOPICAL ONCE
OUTPATIENT
Start: 2024-05-01 | End: 2024-05-01

## 2024-05-01 RX ORDER — IBUPROFEN 200 MG
TABLET ORAL ONCE
OUTPATIENT
Start: 2024-05-01 | End: 2024-05-01

## 2024-05-01 RX ORDER — GINSENG 100 MG
CAPSULE ORAL ONCE
OUTPATIENT
Start: 2024-05-01 | End: 2024-05-01

## 2024-05-01 RX ORDER — LIDOCAINE HYDROCHLORIDE 40 MG/ML
SOLUTION TOPICAL ONCE
OUTPATIENT
Start: 2024-05-01 | End: 2024-05-01

## 2024-05-01 RX ORDER — BETAMETHASONE DIPROPIONATE 0.5 MG/G
CREAM TOPICAL ONCE
OUTPATIENT
Start: 2024-05-01 | End: 2024-05-01

## 2024-05-01 RX ORDER — CLOBETASOL PROPIONATE 0.5 MG/G
OINTMENT TOPICAL ONCE
OUTPATIENT
Start: 2024-05-01 | End: 2024-05-01

## 2024-05-01 RX ORDER — LIDOCAINE 40 MG/G
CREAM TOPICAL ONCE
OUTPATIENT
Start: 2024-05-01 | End: 2024-05-01

## 2024-05-01 RX ORDER — LIDOCAINE HYDROCHLORIDE 20 MG/ML
JELLY TOPICAL ONCE
Status: COMPLETED | OUTPATIENT
Start: 2024-05-01 | End: 2024-05-01

## 2024-05-01 RX ORDER — GENTAMICIN SULFATE 1 MG/G
OINTMENT TOPICAL ONCE
OUTPATIENT
Start: 2024-05-01 | End: 2024-05-01

## 2024-05-01 RX ORDER — BACITRACIN ZINC AND POLYMYXIN B SULFATE 500; 1000 [USP'U]/G; [USP'U]/G
OINTMENT TOPICAL ONCE
OUTPATIENT
Start: 2024-05-01 | End: 2024-05-01

## 2024-05-01 RX ORDER — SODIUM CHLOR/HYPOCHLOROUS ACID 0.033 %
SOLUTION, IRRIGATION IRRIGATION ONCE
OUTPATIENT
Start: 2024-05-01 | End: 2024-05-01

## 2024-05-01 RX ORDER — TRIAMCINOLONE ACETONIDE 1 MG/G
OINTMENT TOPICAL ONCE
OUTPATIENT
Start: 2024-05-01 | End: 2024-05-01

## 2024-05-01 RX ADMIN — LIDOCAINE HYDROCHLORIDE: 20 JELLY TOPICAL at 09:26

## 2024-05-01 NOTE — WOUND CARE
Discharge Instructions for  [facility]  [address] [place] [address]  [address]  [place] [address]  Phone [phone]   Fax [phone]      NAME:  Gilbert Yo  YOB: 1960  MEDICAL RECORD NUMBER:  [id number]  DATE:  5/1/2024    Return Appointment:   1 week with Abiel Nicholas DO    Left Knee:  Cleanse wound with Normal Saline.  Vashe moistened gauze to wound bed and periwound for 1 minute   Hydrofera Blue: Cut to wound size, moisten with saline, and apply to wound bed. Cut larger than size of wound to apply compression to edges of wound  Cover with ABD  May secure with minimal tape as desired.Wrap with Kerlix.  Dressing change daily or more often as needed.  Follow dressing with Tubigrip.    Home Health for dressing changes 2x each week. Patient to change at all other times. Please order enough supplies for patient to keep wound dressing dry and intact.  Compression: Tubigrip from base of toes to mid thigh using E, F, and G to graduate the compression appropriately.  Elevate lower legs above level of heart when not ambulating. Activity is beneficial.  Protein 100 g daily throughout the day.     Should you experience increased redness, swelling, pain, foul odor, size of wound(s), or have a temperature over 101 degrees please contact the Wound Healing Center at [phone] or if after hours contact your primary care physician or go to the hospital emergency department.    PLEASE NOTE: IF YOU ARE UNABLE TO OBTAIN WOUND SUPPLIES, CONTINUE TO USE THE SUPPLIES YOU HAVE AVAILABLE UNTIL YOU ARE ABLE TO REACH US. IT IS MOST IMPORTANT TO KEEP THE WOUND COVERED AT ALL TIMES.    Electronically signed Kelly Kendall PT, WCC on 5/1/2024 at 9:28 AM

## 2024-05-01 NOTE — FLOWSHEET NOTE
05/01/24 0912   Left Leg Edema Point of Measurement   Leg circumference 49 cm   Ankle circumference 30 cm   Foot circumference 25 cm   Compression Therapy Tubular elastic support bandage   Wound 04/03/24 Knee Left;Anterior #1   Date First Assessed/Time First Assessed: 04/03/24 1129   Present on Original Admission: Yes  Wound Approximate Age at First Assessment (Weeks): 3.5 weeks  Primary Wound Type: Surgical Type  Location: Knee  Wound Location Orientation: Left;Anterior  Wo...   Wound Image     Wound Etiology Non-Healing Surgical   Dressing Status Old drainage noted   Wound Cleansed Cleansed with saline   Dressing/Treatment Alginate with Ag;ABD   Wound Length (cm) 1 cm   Wound Width (cm) 1 cm   Wound Depth (cm) 0.4 cm   Wound Surface Area (cm^2) 1 cm^2   Change in Wound Size % (l*w) 40.48   Wound Volume (cm^3) 0.4 cm^3   Wound Healing % 40   Post-Procedure Length (cm) 1 cm   Post-Procedure Width (cm) 1 cm   Post-Procedure Depth (cm) 0.4 cm   Post-Procedure Surface Area (cm^2) 1 cm^2   Post-Procedure Volume (cm^3) 0.4 cm^3   Wound Assessment Granulation tissue   Drainage Amount Moderate (25-50%)   Drainage Description Serosanguinous   Odor None   Maria M-wound Assessment Edematous   Wound Thickness Description not for Pressure Injury Full thickness   Pain Assessment   Pain Assessment None - Denies Pain     Pre- and post-debridement. Patient is currently taking Aspirin 81 mg

## 2024-05-01 NOTE — TELEPHONE ENCOUNTER
Call received from  RN stating that they had to discharge patient last week due to not being homebound.

## 2024-05-07 ENCOUNTER — HOSPITAL ENCOUNTER (OUTPATIENT)
Dept: PHYSICAL THERAPY | Age: 64
Setting detail: RECURRING SERIES
End: 2024-05-07
Attending: ORTHOPAEDIC SURGERY
Payer: COMMERCIAL

## 2024-05-08 ENCOUNTER — HOSPITAL ENCOUNTER (OUTPATIENT)
Dept: WOUND CARE | Age: 64
Discharge: HOME | End: 2024-05-08
Payer: COMMERCIAL

## 2024-05-08 VITALS
DIASTOLIC BLOOD PRESSURE: 64 MMHG | HEIGHT: 74 IN | RESPIRATION RATE: 18 BRPM | TEMPERATURE: 98.1 F | SYSTOLIC BLOOD PRESSURE: 124 MMHG | HEART RATE: 86 BPM | WEIGHT: 315 LBS | BODY MASS INDEX: 40.43 KG/M2

## 2024-05-08 DIAGNOSIS — S81.002A OPEN WOUND OF LEFT KNEE, INITIAL ENCOUNTER: Primary | ICD-10-CM

## 2024-05-08 PROCEDURE — 11042 DBRDMT SUBQ TIS 1ST 20SQCM/<: CPT

## 2024-05-08 RX ORDER — GINSENG 100 MG
CAPSULE ORAL ONCE
OUTPATIENT
Start: 2024-05-08 | End: 2024-05-08

## 2024-05-08 RX ORDER — BETAMETHASONE DIPROPIONATE 0.5 MG/G
CREAM TOPICAL ONCE
OUTPATIENT
Start: 2024-05-08 | End: 2024-05-08

## 2024-05-08 RX ORDER — LIDOCAINE HYDROCHLORIDE 20 MG/ML
JELLY TOPICAL ONCE
OUTPATIENT
Start: 2024-05-08 | End: 2024-05-08

## 2024-05-08 RX ORDER — CLOBETASOL PROPIONATE 0.5 MG/G
OINTMENT TOPICAL ONCE
OUTPATIENT
Start: 2024-05-08 | End: 2024-05-08

## 2024-05-08 RX ORDER — LIDOCAINE 40 MG/G
CREAM TOPICAL ONCE
OUTPATIENT
Start: 2024-05-08 | End: 2024-05-08

## 2024-05-08 RX ORDER — TRIAMCINOLONE ACETONIDE 1 MG/G
OINTMENT TOPICAL ONCE
OUTPATIENT
Start: 2024-05-08 | End: 2024-05-08

## 2024-05-08 RX ORDER — SODIUM CHLOR/HYPOCHLOROUS ACID 0.033 %
SOLUTION, IRRIGATION IRRIGATION ONCE
OUTPATIENT
Start: 2024-05-08 | End: 2024-05-08

## 2024-05-08 RX ORDER — GENTAMICIN SULFATE 1 MG/G
OINTMENT TOPICAL ONCE
OUTPATIENT
Start: 2024-05-08 | End: 2024-05-08

## 2024-05-08 RX ORDER — LIDOCAINE 50 MG/G
OINTMENT TOPICAL ONCE
OUTPATIENT
Start: 2024-05-08 | End: 2024-05-08

## 2024-05-08 RX ORDER — LIDOCAINE HYDROCHLORIDE 40 MG/ML
SOLUTION TOPICAL ONCE
OUTPATIENT
Start: 2024-05-08 | End: 2024-05-08

## 2024-05-08 RX ORDER — LIDOCAINE HYDROCHLORIDE 20 MG/ML
JELLY TOPICAL ONCE
Status: COMPLETED | OUTPATIENT
Start: 2024-05-08 | End: 2024-05-08

## 2024-05-08 RX ORDER — BACITRACIN ZINC AND POLYMYXIN B SULFATE 500; 1000 [USP'U]/G; [USP'U]/G
OINTMENT TOPICAL ONCE
OUTPATIENT
Start: 2024-05-08 | End: 2024-05-08

## 2024-05-08 RX ORDER — IBUPROFEN 200 MG
TABLET ORAL ONCE
OUTPATIENT
Start: 2024-05-08 | End: 2024-05-08

## 2024-05-08 RX ADMIN — LIDOCAINE HYDROCHLORIDE: 20 JELLY TOPICAL at 11:13

## 2024-05-08 NOTE — FLOWSHEET NOTE
05/08/24 1041   Right Leg Edema Point of Measurement   Leg circumference 51 cm   Ankle circumference 31 cm   Foot circumference 27 cm   Left Leg Edema Point of Measurement   Leg circumference 52 cm   Ankle circumference 31.5 cm   Foot circumference 27.5 cm   Compression Therapy Tubular elastic support bandage   Wound 04/03/24 Knee Left;Anterior #1   Date First Assessed/Time First Assessed: 04/03/24 1129   Present on Original Admission: Yes  Wound Approximate Age at First Assessment (Weeks): 3.5 weeks  Primary Wound Type: Surgical Type  Location: Knee  Wound Location Orientation: Left;Anterior  Wo...   Wound Image    Wound Etiology Non-Healing Surgical   Dressing Status Old drainage noted   Wound Cleansed Vashe   Dressing/Treatment Hydrofera blue   Wound Length (cm) 0.9 cm   Wound Width (cm) 0.7 cm   Wound Depth (cm) 0.2 cm   Wound Surface Area (cm^2) 0.63 cm^2   Change in Wound Size % (l*w) 62.5   Wound Volume (cm^3) 0.126 cm^3   Wound Healing % 81   Post-Procedure Length (cm) 0.9 cm   Post-Procedure Width (cm) 0.7 cm   Post-Procedure Depth (cm) 0.3 cm   Post-Procedure Surface Area (cm^2) 0.63 cm^2   Post-Procedure Volume (cm^3) 0.189 cm^3   Wound Assessment Granulation tissue;Slough   Drainage Amount Moderate (25-50%)   Drainage Description Serosanguinous   Odor None   Maria M-wound Assessment Edematous   Wound Thickness Description not for Pressure Injury Full thickness     Patient is on asa daily

## 2024-05-08 NOTE — WOUND CARE
Discharge Instructions for  [facility]  [address] [place] [address]  [address]  [place] [address]  Phone [phone]   Fax [phone]      NAME:  Gilbert Yo  YOB: 1960  MEDICAL RECORD NUMBER:  [id number]  DATE:  5/8/2024    Return Appointment:   1 week with Abiel Nicholas DO    Left Knee:  Cleanse wound with Normal Saline.  Vashe moistened gauze to wound bed and periwound for 1 minute   Hydrofera Ready: Cut to greater than wound size, place in wound bed, shiny side out.   Secure with paper or cloth tape.  Change 3x weekly or as needed.  Follow dressing with Tubigrip to bilateral lower legs.    Compression: Tubigrip from base of toes to mid thigh using E, F, and G to graduate the compression appropriately.  Elevate lower legs above level of heart when not ambulating. Activity is beneficial.  Protein 100 g daily throughout the day.     Follow up with your primary care about swelling in your lower legs and possible prescription for a fluid pill.     Should you experience increased redness, swelling, pain, foul odor, size of wound(s), or have a temperature over 101 degrees please contact the Wound Healing Center at [phone] or if after hours contact your primary care physician or go to the hospital emergency department.    PLEASE NOTE: IF YOU ARE UNABLE TO OBTAIN WOUND SUPPLIES, CONTINUE TO USE THE SUPPLIES YOU HAVE AVAILABLE UNTIL YOU ARE ABLE TO REACH US. IT IS MOST IMPORTANT TO KEEP THE WOUND COVERED AT ALL TIMES.    Electronically signed Sarah Wills RN, [facility] on 5/8/2024 at 10:58 AM

## 2024-05-14 ENCOUNTER — HOSPITAL ENCOUNTER (OUTPATIENT)
Dept: PHYSICAL THERAPY | Age: 64
Setting detail: RECURRING SERIES
Discharge: HOME | End: 2024-05-17
Attending: ORTHOPAEDIC SURGERY
Payer: COMMERCIAL

## 2024-05-14 DIAGNOSIS — G89.29 CHRONIC PAIN OF LEFT KNEE: ICD-10-CM

## 2024-05-14 DIAGNOSIS — R26.2 DIFFICULTY IN WALKING, NOT ELSEWHERE CLASSIFIED: ICD-10-CM

## 2024-05-14 DIAGNOSIS — M25.469 SWELLING OF KNEE: ICD-10-CM

## 2024-05-14 DIAGNOSIS — M62.81 MUSCLE WEAKNESS (GENERALIZED): Primary | ICD-10-CM

## 2024-05-14 DIAGNOSIS — M25.562 CHRONIC PAIN OF LEFT KNEE: ICD-10-CM

## 2024-05-14 PROCEDURE — 97016 VASOPNEUMATIC DEVICE THERAPY: CPT

## 2024-05-14 PROCEDURE — 97110 THERAPEUTIC EXERCISES: CPT

## 2024-05-14 PROCEDURE — 97161 PT EVAL LOW COMPLEX 20 MIN: CPT

## 2024-05-14 ASSESSMENT — PAIN DESCRIPTION - PAIN TYPE: TYPE: CHRONIC PAIN;SURGICAL PAIN

## 2024-05-14 ASSESSMENT — PAIN SCALES - GENERAL: PAINLEVEL_OUTOF10: 2

## 2024-05-14 ASSESSMENT — PAIN DESCRIPTION - ORIENTATION: ORIENTATION: LEFT

## 2024-05-14 ASSESSMENT — PAIN DESCRIPTION - DESCRIPTORS: DESCRIPTORS: ACHING;SORE;TIGHTNESS

## 2024-05-14 ASSESSMENT — PAIN DESCRIPTION - LOCATION: LOCATION: KNEE

## 2024-05-14 NOTE — PROGRESS NOTES
Gilbert Yo  : 1960  Primary: [employer] [place] [employer] [place]  Secondary: MEDICARE PART A O [facility]  [address]  [address]  [facility] [address]  Phone: [phone]  Fax: [phone] Plan Frequency: 2-3x/week x 90 days  Plan of Care/Certification Expiration Date: 24        Plan of Care/Certification Expiration Date:  Plan of Care/Certification Expiration Date: 24    Frequency/Duration: Plan Frequency: 2-3x/week x 90 days      Time In/Out:   Time In: 1300  Time Out: 1400      PT Visit Info:    Progress Note Due Date: 24  Total # of Visits to Date: 1      Visit Count:  1    OUTPATIENT PHYSICAL THERAPY:   Treatment Note 2024       Episode  (S/P L TKA 24; Revision 3/8/24)               Treatment Diagnosis:    Muscle weakness (generalized)  Difficulty in walking, not elsewhere classified  Swelling of knee  Chronic pain of left knee  Medical/Referring Diagnosis:    Status post left knee replacement    Referring Physician:  Hal Dominguez Jr., MD MD Orders:  PT Eval and Treat   Return MD Appt:  24   Date of Onset:  Onset Date: 24     Allergies:   Patient has no known allergies.  Restrictions/Precautions:   None  Has been seen by wound center due to slow healing incision; appears to have some B LE \"weeping\" due to swelling       Interventions Planned (Treatment may consist of any combination of the following):     See Assessment Note    Subjective Comments:   Patient c/o B LE swelling, L > R. He states some stiffness in L knee, but no pain. He reports compliance with HEP since he was Dc'd by  PT on 24.  Initial Pain Level:: Left Knee 2/10  Post Session Pain Level:  Left  Knee 1/10  Medications Last Reviewed:  2024  Updated Objective Findings:  See Evaluation Note from today; L knee AROM: 5-106deg (5.14.24)  Treatment   THERAPEUTIC EXERCISE: (25 minutes):    Exercises per grid below to improve mobility, strength, balance, and coordination.

## 2024-05-14 NOTE — THERAPY EVALUATION
patient.)  Short-Term Functional Goals: Time Frame: (4-6 weeks) 6-25-24  1. Pt will be compliant and independent with HEP.  2. Pt will improve score on the KOOS jr to 4/5 to increase pt's overall functional mobility.  3. Pt will improve L knee AROM to 0-115 deg to increase pt's ease with transfers and gait.  4. Pt will be able to sit-stand from standard height without use of UE or compensatory weight shifting to rise.  5. Pt will be able to ambulate with a near normal gait pattern for community distances without an AD.   6. Pt will subjectively report decreased frequency (3x/week) of waking due to knee pain.      Discharge Goals: Time Frame: (8-12 weeks ) 8-12-24  1.   Pt will improve score on the KOOS jr to 2/5 to increase pt's overall functional mobility.  2.   Pt will improve knee AROM to 0-125 deg to increase pt's ease with transfers, gait and balance.  3.   Pt will be able to sit-stand from toilet height w/out use of UE for assistance to rise.  4.   Pt will be able to ascend/descend 6-8\" steps reciprocally  with use of a rail with good form and control.   5.   Pt will ambulate with a normal gait pattern over level and unlevel surfaces without LOB.   6.   Pt will be DC'd from PT to HEP.           Outcome Measure:   Tool Used: Knee injury and Osteoarthritis Outcome Score for Joint Replacement (KOOS, JR)  Score:  Initial: 5 (Interval: 73.342) 5/14/2024 Most Recent: TBD   Interpretation of Score:  The KOOS, JR contains 7 items from the original KOOS survey. Items are coded from 0 to 4, none to extreme respectively.   KOOS, JR is scored by summing the raw response (range 0-28) and then converting it to an interval score using the table provided below. The interval score ranges from 0 to 100 where 0 represents total knee disability and 100 represents perfect knee health.    Medical Necessity:   > Patient is expected to demonstrate progress in strength, range of motion, coordination, and functional technique to

## 2024-05-15 ENCOUNTER — APPOINTMENT (OUTPATIENT)
Dept: ULTRASOUND IMAGING | Age: 64
End: 2024-05-15
Payer: COMMERCIAL

## 2024-05-15 ENCOUNTER — HOSPITAL ENCOUNTER (EMERGENCY)
Age: 64
Discharge: HOME | End: 2024-05-15
Attending: EMERGENCY MEDICINE
Payer: COMMERCIAL

## 2024-05-15 ENCOUNTER — APPOINTMENT (OUTPATIENT)
Dept: GENERAL RADIOLOGY | Age: 64
End: 2024-05-15
Payer: COMMERCIAL

## 2024-05-15 VITALS
BODY MASS INDEX: 40.43 KG/M2 | HEIGHT: 74 IN | OXYGEN SATURATION: 96 % | WEIGHT: 315 LBS | RESPIRATION RATE: 17 BRPM | DIASTOLIC BLOOD PRESSURE: 70 MMHG | TEMPERATURE: 98.1 F | SYSTOLIC BLOOD PRESSURE: 148 MMHG | HEART RATE: 82 BPM

## 2024-05-15 DIAGNOSIS — R60.0 PEDAL EDEMA: Primary | ICD-10-CM

## 2024-05-15 LAB
ALBUMIN SERPL-MCNC: 3.2 G/DL (ref 3.2–4.6)
ALBUMIN/GLOB SERPL: 1 (ref 1–1.9)
ALP SERPL-CCNC: 101 U/L (ref 40–129)
ALT SERPL-CCNC: 26 U/L (ref 12–65)
ANION GAP SERPL CALC-SCNC: 13 MMOL/L (ref 9–18)
APPEARANCE UR: CLEAR
AST SERPL-CCNC: 26 U/L (ref 15–37)
BASOPHILS # BLD: 0 K/UL (ref 0–0.2)
BASOPHILS NFR BLD: 1 % (ref 0–2)
BILIRUB SERPL-MCNC: 0.5 MG/DL (ref 0–1.2)
BILIRUB UR QL: NEGATIVE
BUN SERPL-MCNC: 24 MG/DL (ref 8–23)
CALCIUM SERPL-MCNC: 8.8 MG/DL (ref 8.8–10.2)
CHLORIDE SERPL-SCNC: 101 MMOL/L (ref 98–107)
CO2 SERPL-SCNC: 23 MMOL/L (ref 20–28)
COLOR UR: NORMAL
CREAT SERPL-MCNC: 1.19 MG/DL (ref 0.8–1.3)
DIFFERENTIAL METHOD BLD: ABNORMAL
EKG ATRIAL RATE: 82 BPM
EKG DIAGNOSIS: NORMAL
EKG P AXIS: 67 DEGREES
EKG P-R INTERVAL: 147 MS
EKG Q-T INTERVAL: 406 MS
EKG QRS DURATION: 145 MS
EKG QTC CALCULATION (BAZETT): 475 MS
EKG R AXIS: 63 DEGREES
EKG T AXIS: 36 DEGREES
EKG VENTRICULAR RATE: 82 BPM
EOSINOPHIL # BLD: 0.6 K/UL (ref 0–0.8)
EOSINOPHIL NFR BLD: 8 % (ref 0.5–7.8)
ERYTHROCYTE [DISTWIDTH] IN BLOOD BY AUTOMATED COUNT: 14.4 % (ref 11.9–14.6)
GLOBULIN SER CALC-MCNC: 3.1 G/DL (ref 2.3–3.5)
GLUCOSE SERPL-MCNC: 89 MG/DL (ref 70–99)
GLUCOSE UR STRIP.AUTO-MCNC: NEGATIVE MG/DL
HCT VFR BLD AUTO: 33.8 % (ref 41.1–50.3)
HGB BLD-MCNC: 10.9 G/DL (ref 13.6–17.2)
HGB UR QL STRIP: NEGATIVE
IMM GRANULOCYTES # BLD AUTO: 0 K/UL (ref 0–0.5)
IMM GRANULOCYTES NFR BLD AUTO: 0 % (ref 0–5)
KETONES UR QL STRIP.AUTO: NEGATIVE MG/DL
LACTATE SERPL-SCNC: 1 MMOL/L (ref 0.5–2)
LEUKOCYTE ESTERASE UR QL STRIP.AUTO: NEGATIVE
LYMPHOCYTES # BLD: 1.4 K/UL (ref 0.5–4.6)
LYMPHOCYTES NFR BLD: 18 % (ref 13–44)
MCH RBC QN AUTO: 28.5 PG (ref 26.1–32.9)
MCHC RBC AUTO-ENTMCNC: 32.2 G/DL (ref 31.4–35)
MCV RBC AUTO: 88.5 FL (ref 82–102)
MONOCYTES # BLD: 0.9 K/UL (ref 0.1–1.3)
MONOCYTES NFR BLD: 12 % (ref 4–12)
NEUTS SEG # BLD: 4.8 K/UL (ref 1.7–8.2)
NEUTS SEG NFR BLD: 62 % (ref 43–78)
NITRITE UR QL STRIP.AUTO: NEGATIVE
NRBC # BLD: 0 K/UL (ref 0–0.2)
NT PRO BNP: 59 PG/ML (ref 0–125)
PH UR STRIP: 5.5 (ref 5–9)
PLATELET # BLD AUTO: 231 K/UL (ref 150–450)
PMV BLD AUTO: 10.6 FL (ref 9.4–12.3)
POTASSIUM SERPL-SCNC: 4 MMOL/L (ref 3.5–5.1)
PROCALCITONIN SERPL-MCNC: 0.05 NG/ML (ref 0–0.1)
PROT SERPL-MCNC: 6.3 G/DL (ref 6.3–8.2)
PROT UR STRIP-MCNC: NEGATIVE MG/DL
RBC # BLD AUTO: 3.82 M/UL (ref 4.23–5.6)
SODIUM SERPL-SCNC: 137 MMOL/L (ref 136–145)
SP GR UR REFRACTOMETRY: 1.01 (ref 1–1.02)
TROPONIN T SERPL HS-MCNC: 18 NG/L (ref 0–22)
UROBILINOGEN UR QL STRIP.AUTO: 0.2 EU/DL (ref 0.2–1)
WBC # BLD AUTO: 7.7 K/UL (ref 4.3–11.1)

## 2024-05-15 PROCEDURE — 96367 TX/PROPH/DG ADDL SEQ IV INF: CPT

## 2024-05-15 PROCEDURE — 85025 COMPLETE CBC W/AUTO DIFF WBC: CPT

## 2024-05-15 PROCEDURE — 6360000002 HC RX W HCPCS: Performed by: EMERGENCY MEDICINE

## 2024-05-15 PROCEDURE — 84484 ASSAY OF TROPONIN QUANT: CPT

## 2024-05-15 PROCEDURE — 93970 EXTREMITY STUDY: CPT

## 2024-05-15 PROCEDURE — 99285 EMERGENCY DEPT VISIT HI MDM: CPT

## 2024-05-15 PROCEDURE — 80053 COMPREHEN METABOLIC PANEL: CPT

## 2024-05-15 PROCEDURE — 93005 ELECTROCARDIOGRAM TRACING: CPT | Performed by: EMERGENCY MEDICINE

## 2024-05-15 PROCEDURE — 96365 THER/PROPH/DIAG IV INF INIT: CPT

## 2024-05-15 PROCEDURE — 83880 ASSAY OF NATRIURETIC PEPTIDE: CPT

## 2024-05-15 PROCEDURE — 83605 ASSAY OF LACTIC ACID: CPT

## 2024-05-15 PROCEDURE — 84145 PROCALCITONIN (PCT): CPT

## 2024-05-15 PROCEDURE — 81003 URINALYSIS AUTO W/O SCOPE: CPT

## 2024-05-15 PROCEDURE — 6370000000 HC RX 637 (ALT 250 FOR IP): Performed by: EMERGENCY MEDICINE

## 2024-05-15 PROCEDURE — 87040 BLOOD CULTURE FOR BACTERIA: CPT

## 2024-05-15 PROCEDURE — 2580000003 HC RX 258: Performed by: EMERGENCY MEDICINE

## 2024-05-15 PROCEDURE — 71045 X-RAY EXAM CHEST 1 VIEW: CPT

## 2024-05-15 PROCEDURE — 96366 THER/PROPH/DIAG IV INF ADDON: CPT

## 2024-05-15 RX ORDER — FUROSEMIDE 40 MG/1
20 TABLET ORAL
Status: COMPLETED | OUTPATIENT
Start: 2024-05-15 | End: 2024-05-15

## 2024-05-15 RX ORDER — 0.9 % SODIUM CHLORIDE 0.9 %
30 INTRAVENOUS SOLUTION INTRAVENOUS ONCE
Status: COMPLETED | OUTPATIENT
Start: 2024-05-15 | End: 2024-05-15

## 2024-05-15 RX ORDER — FUROSEMIDE 20 MG/1
20 TABLET ORAL 2 TIMES DAILY
Qty: 20 TABLET | Refills: 0 | Status: SHIPPED | OUTPATIENT
Start: 2024-05-15 | End: 2024-05-25

## 2024-05-15 RX ADMIN — FUROSEMIDE 20 MG: 40 TABLET ORAL at 18:49

## 2024-05-15 RX ADMIN — Medication 2500 MG: at 17:37

## 2024-05-15 RX ADMIN — SODIUM CHLORIDE 2466 ML: 9 INJECTION, SOLUTION INTRAVENOUS at 16:07

## 2024-05-15 RX ADMIN — PIPERACILLIN AND TAZOBACTAM 4500 MG: 4; .5 INJECTION, POWDER, LYOPHILIZED, FOR SOLUTION INTRAVENOUS at 16:06

## 2024-05-15 ASSESSMENT — ENCOUNTER SYMPTOMS
NAUSEA: 0
DIARRHEA: 0
COUGH: 0
CHEST TIGHTNESS: 0
SHORTNESS OF BREATH: 0
EYE DISCHARGE: 0
BACK PAIN: 0
VOMITING: 0
EYE ITCHING: 0

## 2024-05-15 ASSESSMENT — PAIN - FUNCTIONAL ASSESSMENT: PAIN_FUNCTIONAL_ASSESSMENT: NONE - DENIES PAIN

## 2024-05-15 NOTE — ED TRIAGE NOTES
Patient reports right lower leg swelling x 1 week. Patient reports right lower leg began to blister shortly after the swelling began

## 2024-05-15 NOTE — ED PROVIDER NOTES
Absolute 4.8 1.7 - 8.2 K/UL    Lymphocytes Absolute 1.4 0.5 - 4.6 K/UL    Monocytes Absolute 0.9 0.1 - 1.3 K/UL    Eosinophils Absolute 0.6 0.0 - 0.8 K/UL    Basophils Absolute 0.0 0.0 - 0.2 K/UL    Immature Granulocytes Absolute 0.0 0.0 - 0.5 K/UL   Lactate, Sepsis   Result Value Ref Range    Lactic Acid, Sepsis 1.0 0.5 - 2.0 MMOL/L   Urinalysis w/Reflex to Micro   Result Value Ref Range    Color, UA YELLOW/STRAW      Appearance CLEAR      Specific Gravity, UA 1.015 1.001 - 1.023      pH, Urine 5.5 5.0 - 9.0      Protein, UA Negative NEG mg/dL    Glucose, Ur Negative mg/dL    Ketones, Urine Negative NEG mg/dL    Bilirubin, Urine Negative NEG      Blood, Urine Negative NEG      Urobilinogen, Urine 0.2 0.2 - 1.0 EU/dL    Nitrite, Urine Negative NEG      Leukocyte Esterase, Urine Negative NEG     Troponin   Result Value Ref Range    Troponin T 18.0 0 - 22 ng/L   Brain Natriuretic Peptide   Result Value Ref Range    NT Pro-BNP 59 0 - 125 PG/ML   Procalcitonin   Result Value Ref Range    Procalcitonin 0.05 0.00 - 0.10 ng/mL   Vascular duplex lower extremity venous bilateral    Narrative    Bilateral lower extremity venous ultrasound    INDICATION:  Pain and swelling    Doppler ultrasound of both lower extremities was performed.    COMPARISON:  None    FINDINGS:  There is normal flow in the right great saphenous, common femoral,  and femoral veins. Normal compression and augmentation is demonstrated.  The  proximal right calf veins were not well visualized.     There is normal flow in the left great saphenous, and common femoral veins.  Femoral vein not well visualized. Normal compression and augmentation is  demonstrated.  The proximal left calf veins were not well visualized.      Impression    1.  No evidence of deep venous thrombosis in the right lower extremity.  2.  No evidence of deep venous thrombosis in the left lower extremity.           Vascular duplex lower extremity venous bilateral   Final Result   1.  No

## 2024-05-15 NOTE — DISCHARGE INSTRUCTIONS
Start Lasix as prescribed  Elevate your legs is much as possible    Call your primary care doctor in the morning to establish close follow-up    Return to ER for any worsening symptoms or new problems which may arise

## 2024-05-16 ENCOUNTER — HOSPITAL ENCOUNTER (OUTPATIENT)
Dept: WOUND CARE | Age: 64
Discharge: HOME | End: 2024-05-16
Payer: COMMERCIAL

## 2024-05-16 ENCOUNTER — HOSPITAL ENCOUNTER (OUTPATIENT)
Dept: PHYSICAL THERAPY | Age: 64
Setting detail: RECURRING SERIES
Discharge: HOME | End: 2024-05-19
Attending: ORTHOPAEDIC SURGERY
Payer: COMMERCIAL

## 2024-05-16 VITALS
DIASTOLIC BLOOD PRESSURE: 64 MMHG | TEMPERATURE: 98.2 F | HEART RATE: 88 BPM | BODY MASS INDEX: 40.43 KG/M2 | HEIGHT: 74 IN | RESPIRATION RATE: 18 BRPM | SYSTOLIC BLOOD PRESSURE: 143 MMHG | WEIGHT: 315 LBS

## 2024-05-16 DIAGNOSIS — S81.002A OPEN WOUND OF LEFT KNEE, INITIAL ENCOUNTER: Primary | ICD-10-CM

## 2024-05-16 PROCEDURE — 11042 DBRDMT SUBQ TIS 1ST 20SQCM/<: CPT

## 2024-05-16 PROCEDURE — 97016 VASOPNEUMATIC DEVICE THERAPY: CPT

## 2024-05-16 PROCEDURE — 97110 THERAPEUTIC EXERCISES: CPT

## 2024-05-16 PROCEDURE — 11042 DBRDMT SUBQ TIS 1ST 20SQCM/<: CPT | Performed by: FAMILY MEDICINE

## 2024-05-16 RX ORDER — CLOBETASOL PROPIONATE 0.5 MG/G
OINTMENT TOPICAL ONCE
OUTPATIENT
Start: 2024-05-16 | End: 2024-05-16

## 2024-05-16 RX ORDER — LIDOCAINE HYDROCHLORIDE 40 MG/ML
SOLUTION TOPICAL ONCE
OUTPATIENT
Start: 2024-05-16 | End: 2024-05-16

## 2024-05-16 RX ORDER — BETAMETHASONE DIPROPIONATE 0.5 MG/G
CREAM TOPICAL ONCE
OUTPATIENT
Start: 2024-05-16 | End: 2024-05-16

## 2024-05-16 RX ORDER — BACITRACIN ZINC AND POLYMYXIN B SULFATE 500; 1000 [USP'U]/G; [USP'U]/G
OINTMENT TOPICAL ONCE
OUTPATIENT
Start: 2024-05-16 | End: 2024-05-16

## 2024-05-16 RX ORDER — IBUPROFEN 200 MG
TABLET ORAL ONCE
OUTPATIENT
Start: 2024-05-16 | End: 2024-05-16

## 2024-05-16 RX ORDER — GENTAMICIN SULFATE 1 MG/G
OINTMENT TOPICAL ONCE
OUTPATIENT
Start: 2024-05-16 | End: 2024-05-16

## 2024-05-16 RX ORDER — LIDOCAINE 50 MG/G
OINTMENT TOPICAL ONCE
OUTPATIENT
Start: 2024-05-16 | End: 2024-05-16

## 2024-05-16 RX ORDER — LIDOCAINE HYDROCHLORIDE 20 MG/ML
JELLY TOPICAL ONCE
OUTPATIENT
Start: 2024-05-16 | End: 2024-05-16

## 2024-05-16 RX ORDER — LIDOCAINE HYDROCHLORIDE 20 MG/ML
JELLY TOPICAL ONCE
Status: COMPLETED | OUTPATIENT
Start: 2024-05-16 | End: 2024-05-16

## 2024-05-16 RX ORDER — GINSENG 100 MG
CAPSULE ORAL ONCE
OUTPATIENT
Start: 2024-05-16 | End: 2024-05-16

## 2024-05-16 RX ORDER — LIDOCAINE 40 MG/G
CREAM TOPICAL ONCE
OUTPATIENT
Start: 2024-05-16 | End: 2024-05-16

## 2024-05-16 RX ORDER — TRIAMCINOLONE ACETONIDE 1 MG/G
OINTMENT TOPICAL ONCE
OUTPATIENT
Start: 2024-05-16 | End: 2024-05-16

## 2024-05-16 RX ORDER — SODIUM CHLOR/HYPOCHLOROUS ACID 0.033 %
SOLUTION, IRRIGATION IRRIGATION ONCE
OUTPATIENT
Start: 2024-05-16 | End: 2024-05-16

## 2024-05-16 RX ADMIN — LIDOCAINE HYDROCHLORIDE: 20 JELLY TOPICAL at 14:53

## 2024-05-16 ASSESSMENT — PAIN DESCRIPTION - DESCRIPTORS: DESCRIPTORS: ACHING;SORE;TIGHTNESS

## 2024-05-16 ASSESSMENT — PAIN DESCRIPTION - ORIENTATION: ORIENTATION: LEFT

## 2024-05-16 ASSESSMENT — PAIN DESCRIPTION - PAIN TYPE: TYPE: CHRONIC PAIN;SURGICAL PAIN

## 2024-05-16 ASSESSMENT — PAIN SCALES - GENERAL: PAINLEVEL_OUTOF10: 2

## 2024-05-16 ASSESSMENT — PAIN DESCRIPTION - LOCATION: LOCATION: KNEE;LEG

## 2024-05-16 NOTE — PROGRESS NOTES
(5000 UT) CAPS capsule Take 1 capsule by mouth daily.      Sodium Chloride Flush (SALINE FLUSH IV) Infuse 10 mLs intravenously daily.      Heparin Sod, Pork, Lock Flush (HEPARIN, PORCINE, LOCK FLUSH IV) Infuse 5 mLs intravenously daily. (Patient not taking: Reported on 5/15/2024)      sodium chloride 0.9 % SOLN 100 mL with ceFAZolin 1 g SOLR 2,000 mg Infuse 6 g intravenously continuous. infuse daily over 24 hours. (Patient not taking: Reported on 5/15/2024)      losartan (COZAAR) 25 MG tablet Take 1 tablet by mouth daily 30 tablet 2    amLODIPine (NORVASC) 10 MG tablet Take 1 tablet by mouth daily 30 tablet 2    aspirin (ASPIRIN 81) 81 MG EC tablet Take 1 tablet by mouth in the morning and at bedtime 70 tablet 0    promethazine (PHENERGAN) 12.5 MG tablet Take 1 tablet by mouth 4 times daily as needed for Nausea (Patient not taking: Reported on 5/15/2024) 20 tablet 2    methocarbamol (ROBAXIN-750) 750 MG tablet Take 1 tablet by mouth 3 times daily as needed (muscle spasm or cramps) 40 tablet 1    VRAYLAR 1.5 MG capsule Take 1 capsule by mouth daily      gabapentin (NEURONTIN) 300 MG capsule Take 1 capsule by mouth daily.      metFORMIN (GLUCOPHAGE-XR) 500 MG extended release tablet Take 1 tablet by mouth 2 times daily      ALPRAZolam (XANAX) 1 MG tablet Take 1 tablet by mouth in the morning and at bedtime. Takes 2 in morning and 1 at bedtime      atorvastatin (LIPITOR) 20 MG tablet Take 1 tablet by mouth daily      escitalopram (LEXAPRO) 20 MG tablet Take 1 tablet by mouth daily       No current facility-administered medications on file prior to encounter.         Written patient dismissal instructions given to patient and signed by patient or POA.         Electronically signed by Abiel Nicholas DO on 5/16/2024 at 3:57 PM

## 2024-05-16 NOTE — WOUND CARE
Discharge Instructions for  [facility]  [address] [place] [address]  [address]  [place] [address]  Phone [phone]   Fax [phone]      NAME:  Gilbert Yo  YOB: 1960  MEDICAL RECORD NUMBER:  [id number]  DATE:  5/16/2024    Return Appointment:   1 week with Abiel Nicholas DO    Left Knee:  Cleanse wound with Normal Saline.  Vashe moistened gauze to wound bed and periwound for 1 minute   Hydrofera Ready: Cut to greater than wound size, place in wound bed, shiny side out.   Secure with paper or cloth tape.  Change 3x weekly or as needed.  Follow dressing with Tubigrip to bilateral lower legs.    Compression: Tubigrip from base of toes to mid thigh using E, F, and G to graduate the compression appropriately.  Elevate lower legs above level of heart when not ambulating. Activity is beneficial.  Protein 100 g daily throughout the day.     Follow up with your primary care about Amlodipine related to swelling in your lower legs.     Should you experience increased redness, swelling, pain, foul odor, size of wound(s), or have a temperature over 101 degrees please contact the [facility] Healing Center at [phone] or if after hours contact your primary care physician or go to the hospital emergency department.    PLEASE NOTE: IF YOU ARE UNABLE TO OBTAIN WOUND SUPPLIES, CONTINUE TO USE THE SUPPLIES YOU HAVE AVAILABLE UNTIL YOU ARE ABLE TO REACH US. IT IS MOST IMPORTANT TO KEEP THE WOUND COVERED AT ALL TIMES.    Electronically signed Ce Ramirez RN, [facility] on 5/16/2024 at 2:40 PM

## 2024-05-17 NOTE — FLOWSHEET NOTE
05/16/24 1428   Right Leg Edema Point of Measurement   Leg circumference 53 cm   Ankle circumference 30 cm   Foot circumference 27 cm   Compression Therapy Tubular elastic support bandage   Left Leg Edema Point of Measurement   Leg circumference 54 cm   Ankle circumference 31 cm   Foot circumference 27 cm   Compression Therapy Tubular elastic support bandage   Wound 04/03/24 Knee Left;Anterior #1   Date First Assessed/Time First Assessed: 04/03/24 1129   Present on Original Admission: Yes  Wound Approximate Age at First Assessment (Weeks): 3.5 weeks  Primary Wound Type: Surgical Type  Location: Knee  Wound Location Orientation: Left;Anterior  Wo...   Wound Image     Wound Etiology Non-Healing Surgical   Dressing Status Old drainage noted   Wound Cleansed Vashe   Dressing/Treatment Hydrofera blue   Wound Length (cm) 0.9 cm   Wound Width (cm) 0.7 cm   Wound Depth (cm) 0.2 cm   Wound Surface Area (cm^2) 0.63 cm^2   Change in Wound Size % (l*w) 62.5   Wound Volume (cm^3) 0.126 cm^3   Wound Healing % 81   Post-Procedure Length (cm) 1 cm   Post-Procedure Width (cm) 0.8 cm   Post-Procedure Depth (cm) 0.2 cm   Post-Procedure Surface Area (cm^2) 0.8 cm^2   Post-Procedure Volume (cm^3) 0.16 cm^3   Wound Assessment Granulation tissue;Slough   Drainage Amount Moderate (25-50%)   Drainage Description Serosanguinous   Odor None   Maria M-wound Assessment Edematous   Wound Thickness Description not for Pressure Injury Full thickness

## 2024-05-19 LAB
BACTERIA SPEC CULT: NORMAL
BACTERIA SPEC CULT: NORMAL
SERVICE CMNT-IMP: NORMAL
SERVICE CMNT-IMP: NORMAL

## 2024-05-20 ENCOUNTER — HOSPITAL ENCOUNTER (OUTPATIENT)
Dept: PHYSICAL THERAPY | Age: 64
Setting detail: RECURRING SERIES
Discharge: HOME | End: 2024-05-23
Attending: ORTHOPAEDIC SURGERY
Payer: COMMERCIAL

## 2024-05-20 ENCOUNTER — TELEPHONE (OUTPATIENT)
Dept: ORTHOPEDIC SURGERY | Age: 64
End: 2024-05-20

## 2024-05-20 PROCEDURE — 97110 THERAPEUTIC EXERCISES: CPT

## 2024-05-20 ASSESSMENT — PAIN SCALES - GENERAL: PAINLEVEL_OUTOF10: 0

## 2024-05-20 NOTE — TELEPHONE ENCOUNTER
Patient has a post op apt tomorrow 5/21 3:15 wants to know if can come 15-30 min later due to work schedule

## 2024-05-20 NOTE — PROGRESS NOTES
Gilbert Yo  : 1960  [place]: [place] [employer] [place] [employer] [place] [employer]  [facility]  [facility]  [address]  [address]  [facility] [address]  Phone: [phone]  Fax: [phone] Plan Frequency: 2-3x/week x 90 days  Plan of Care/Certification Expiration Date: 24        Plan of Care/Certification Expiration Date:  Plan of Care/Certification Expiration Date: 24    Frequency/Duration: Plan Frequency: 2-3x/week x 90 days      Time In/Out:   Time In: 1115  Time Out: 1200      PT Visit Info:    Progress Note Due Date: 24  Total # of Visits to Date: 3      Visit Count:  3    OUTPATIENT PHYSICAL THERAPY:   Treatment Note 2024       Episode  (S/P L TKA 24; Revision 3/8/24)               Treatment Diagnosis:    Muscle weakness (generalized)  Difficulty in walking, not elsewhere classified  Swelling of knee  Chronic pain of left knee  Medical/Referring Diagnosis:        Referring Physician:  Hal Dominguez Jr., MD MD Orders:  PT Eval and Treat   Return MD Appt:  24   Date of Onset:  Onset Date: 24     Allergies:   Patient has no known allergies.  Restrictions/Precautions:   None  Has been seen by wound center due to slow healing incision; appears to have some B LE \"weeping\" due to swelling       Interventions Planned (Treatment may consist of any combination of the following):     See Assessment Note    Subjective Comments:   Patient reports mild improvement in his leg swelling with new medication.   Initial Pain Level::     0/10  Post Session Pain Level:       0/10  Medications Last Reviewed:  2024  Updated Objective Findings:  L knee AROM: 5-106deg (5.16.24);   Treatment   THERAPEUTIC EXERCISE: (43 minutes):    Exercises per grid below to improve mobility, strength, balance, and coordination.  Required moderate visual, verbal, and tactile cues to promote proper body alignment, promote proper body posture, promote proper body mechanics, and promote proper body

## 2024-05-21 ENCOUNTER — OFFICE VISIT (OUTPATIENT)
Dept: ORTHOPEDIC SURGERY | Age: 64
End: 2024-05-21

## 2024-05-21 DIAGNOSIS — Z96.652 STATUS POST LEFT KNEE REPLACEMENT: Primary | ICD-10-CM

## 2024-05-21 NOTE — PROGRESS NOTES
Name: Gilbert Yo  YOB: 1960  Gender: male  MRN: [id number]    CC: Wound Check (Lt tka)  Periprosthetic joint infection left total knee    HPI: Gilbert Yo is a 64 y.o. 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  He has been seen by wound therapy for the central portion of the incision which is gradually healing.  He has completed IV antibiotics and is currently on oral antibiotics. His most recent laboratory work revealed that the sed rate and C-reactive protein have returned to normal.  Patient is experiencing very little discomfort and pain in the left knee.    History was obtained by patient    ROS/Meds/PSH/PMH/FH/SH: I personally reviewed the patients standard intake form.      Current Outpatient Medications:     furosemide (LASIX) 20 MG tablet, Take 1 tablet by mouth 2 times daily for 10 days, Disp: 20 tablet, Rfl: 0    cefadroxil (DURICEF) 500 MG capsule, Take 2 capsules by mouth 2 times daily, Disp: , Rfl:     zolpidem (AMBIEN) 10 MG tablet, TAKE ONE TABLET BY MOUTH AT BEDTIME AS NEEDED FOR INSOMNIA, Disp: , Rfl:     rifAMPin (RIFADIN) 300 MG capsule, Take 300 mg by mouth 2 times daily. started on 4/2 - end 4/18 per ID (Patient not taking: Reported on 5/15/2024), Disp: , Rfl:     busPIRone (BUSPAR) 10 MG tablet, Take 10 mg by mouth nightly. Take one to two tablets at night for sleep, Disp: , Rfl:     QUEtiapine (SEROQUEL) 50 MG tablet, Take 50 mg by mouth nightly. take one at night, Disp: , Rfl:     irbesartan (AVAPRO) 300 MG tablet, Take 1 tablet by mouth daily, Disp: , Rfl:     buPROPion (WELLBUTRIN SR) 150 MG extended release tablet, Take 150 mg by mouth daily., Disp: , Rfl:     celecoxib (CELEBREX) 200 MG capsule, Take 200 mg by mouth 2 times daily., Disp: , Rfl:     vitamin D (VITAMIN D3) 125 MCG (5000 UT) CAPS capsule, Take 1 capsule by mouth daily., Disp: , Rfl:     Sodium Chloride 
No significant past surgical history

## 2024-05-21 NOTE — PROGRESS NOTES
Gilbert Yo  : 1960  Primary: [place] [employer] [place] [employer] [place] [employer]  Secondary:  O [facility]  [address]  [address]  [facility] [address]  Phone: [phone]  Fax: [phone] Plan Frequency: 2-3x/week x 90 days  Plan of Care/Certification Expiration Date: 24        Plan of Care/Certification Expiration Date:  Plan of Care/Certification Expiration Date: 24    Frequency/Duration: Plan Frequency: 2-3x/week x 90 days      Time In/Out:   Time In: 1115  Time Out: 1200      PT Visit Info:    Progress Note Due Date: 24  Total # of Visits to Date: 3      Visit Count:  3                OUTPATIENT PHYSICAL THERAPY:             Progress Report 2024               Episode (S/P L TKA 24; Revision 3/8/24)         Treatment Diagnosis:     No data found  Medical/Referring Diagnosis:        Referring Physician:  Hal Dominguez Jr., MD MD Orders:  PT Eval and Treat   Return MD Appt:  24  Date of Onset:  Onset Date: 24     Allergies:  Patient has no known allergies.  Restrictions/Precautions:    None  Patient has been under the care of wound care due to slow healing incision and he is a TKA revision from 24      Medications Last Reviewed:  2024         ASSESSMENT   Assessment: Gilbert Yo has completed 3 sessions of PT. It is noted that he made trip to ER last week due to swelling in both LE's.He continues to see Wound Care weekly for open incision, and reports slow but steady healing.  presents with decreased L knee AROM, strength, balance, gait and increased swelling. He reports no pain on presentation today. Patient will benefit from continmued PT to address the deficits listed below and and improve his gait quality/efficiency and stair negotiation.   Therapy Problem List: (Impacting functional limitations):    Increased Pain, Decreased Strength, Decreased ROM, Decreased Ambulation Ability/Technique, Decreased Functional Mobility, Decreased [place]  car

## 2024-05-22 ENCOUNTER — HOSPITAL ENCOUNTER (OUTPATIENT)
Dept: PHYSICAL THERAPY | Age: 64
Setting detail: RECURRING SERIES
Discharge: HOME | End: 2024-05-25
Attending: ORTHOPAEDIC SURGERY
Payer: COMMERCIAL

## 2024-05-22 PROCEDURE — 97110 THERAPEUTIC EXERCISES: CPT

## 2024-05-22 NOTE — PROGRESS NOTES
almost done with wound care.   Communication/Consultation:  None today  Equipment provided today:  None   Recommendations/Intent for next treatment session: Next visit will focus on advancements to more challenging LE stretching, strengthening and ROM activities.     >Total Treatment Billable Duration:  40 minutes therex  Time In: 1125  Time Out: 1208    Casey De Los Santos, PT         Charge Capture  [employer] Portal  Appt Desk  Attendance Report     Future Appointments   Date Time Provider Department Center   5/24/2024  8:00 AM Abiel Nicholas,  [facility]   5/24/2024 11:00 AM [facility] PRN PROVIDER [facility]   5/28/2024 11:15 AM Belen Villela, PT [facility]   5/30/2024 11:15 AM Ovi Leach, PT [facility]   6/3/2024 11:15 AM Belen Villela, PT [facility]   6/5/2024 11:15 AM Belen Villela, PT [facility]   6/7/2024 11:00 AM Chris Lucas, PT [facility]   6/10/2024 11:15 AM Belen Villela, PT [facility]   6/12/2024 11:15 AM Belen Villela, PT [facility]   6/14/2024 11:00 AM Iraida Garrido, PT [facility]   6/17/2024 11:15 AM Belen Villela, PT [facility]   6/19/2024 11:15 AM Belen Villela, PT [facility]   6/21/2024 11:00 AM Chris Lucas, PT [facility]   6/24/2024 11:15 AM Belen Villela, PT [facility]   6/26/2024 11:15 AM Belen Villela, PT [facility]   6/28/2024 11:00 AM Iraida Garrido, PT [facility]   7/9/2024  8:30 AM Hal Dominguez Jr., MD [facility]

## 2024-05-24 ENCOUNTER — HOSPITAL ENCOUNTER (OUTPATIENT)
Dept: WOUND CARE | Age: 64
Discharge: HOME | End: 2024-05-24
Payer: COMMERCIAL

## 2024-05-24 VITALS
RESPIRATION RATE: 16 BRPM | HEART RATE: 84 BPM | OXYGEN SATURATION: 97 % | DIASTOLIC BLOOD PRESSURE: 62 MMHG | SYSTOLIC BLOOD PRESSURE: 128 MMHG | TEMPERATURE: 97.7 F | BODY MASS INDEX: 44.73 KG/M2 | WEIGHT: 315 LBS

## 2024-05-24 DIAGNOSIS — S81.002A OPEN WOUND OF LEFT KNEE, INITIAL ENCOUNTER: Primary | ICD-10-CM

## 2024-05-24 PROCEDURE — 11042 DBRDMT SUBQ TIS 1ST 20SQCM/<: CPT

## 2024-05-24 PROCEDURE — 6370000000 HC RX 637 (ALT 250 FOR IP): Performed by: FAMILY MEDICINE

## 2024-05-24 RX ORDER — GENTAMICIN SULFATE 1 MG/G
OINTMENT TOPICAL ONCE
OUTPATIENT
Start: 2024-05-24 | End: 2024-05-24

## 2024-05-24 RX ORDER — LIDOCAINE HYDROCHLORIDE 40 MG/ML
SOLUTION TOPICAL ONCE
OUTPATIENT
Start: 2024-05-24 | End: 2024-05-24

## 2024-05-24 RX ORDER — LIDOCAINE 50 MG/G
OINTMENT TOPICAL ONCE
OUTPATIENT
Start: 2024-05-24 | End: 2024-05-24

## 2024-05-24 RX ORDER — LIDOCAINE HYDROCHLORIDE 20 MG/ML
JELLY TOPICAL ONCE
OUTPATIENT
Start: 2024-05-24 | End: 2024-05-24

## 2024-05-24 RX ORDER — LIDOCAINE 40 MG/G
CREAM TOPICAL ONCE
OUTPATIENT
Start: 2024-05-24 | End: 2024-05-24

## 2024-05-24 RX ORDER — GINSENG 100 MG
CAPSULE ORAL ONCE
OUTPATIENT
Start: 2024-05-24 | End: 2024-05-24

## 2024-05-24 RX ORDER — LIDOCAINE 50 MG/G
OINTMENT TOPICAL ONCE
Status: COMPLETED | OUTPATIENT
Start: 2024-05-24 | End: 2024-05-24

## 2024-05-24 RX ORDER — CLOBETASOL PROPIONATE 0.5 MG/G
OINTMENT TOPICAL ONCE
OUTPATIENT
Start: 2024-05-24 | End: 2024-05-24

## 2024-05-24 RX ORDER — SODIUM CHLOR/HYPOCHLOROUS ACID 0.033 %
SOLUTION, IRRIGATION IRRIGATION ONCE
OUTPATIENT
Start: 2024-05-24 | End: 2024-05-24

## 2024-05-24 RX ORDER — TRIAMCINOLONE ACETONIDE 1 MG/G
OINTMENT TOPICAL ONCE
OUTPATIENT
Start: 2024-05-24 | End: 2024-05-24

## 2024-05-24 RX ORDER — BETAMETHASONE DIPROPIONATE 0.5 MG/G
CREAM TOPICAL ONCE
OUTPATIENT
Start: 2024-05-24 | End: 2024-05-24

## 2024-05-24 RX ORDER — BACITRACIN ZINC AND POLYMYXIN B SULFATE 500; 1000 [USP'U]/G; [USP'U]/G
OINTMENT TOPICAL ONCE
OUTPATIENT
Start: 2024-05-24 | End: 2024-05-24

## 2024-05-24 RX ORDER — IBUPROFEN 200 MG
TABLET ORAL ONCE
OUTPATIENT
Start: 2024-05-24 | End: 2024-05-24

## 2024-05-24 RX ADMIN — LIDOCAINE: 50 OINTMENT TOPICAL at 08:44

## 2024-05-24 NOTE — FLOWSHEET NOTE
05/24/24 0817   Left Leg Edema Point of Measurement   Leg circumference 53 cm   Ankle circumference 30 cm   Foot circumference 26 cm   Compression Therapy Tubular elastic support bandage   Wound 04/03/24 Knee Left;Anterior #1   Date First Assessed/Time First Assessed: 04/03/24 1129   Present on Original Admission: Yes  Wound Approximate Age at First Assessment (Weeks): 3.5 weeks  Primary Wound Type: Surgical Type  Location: Knee  Wound Location Orientation: Left;Anterior  Wo...   Wound Image     Wound Etiology Non-Healing Surgical   Dressing Status Old drainage noted   Wound Cleansed Cleansed with saline   Dressing/Treatment Hydrofera blue   Wound Length (cm) 0.9 cm   Wound Width (cm) 0.7 cm   Wound Depth (cm) 0.3 cm   Wound Surface Area (cm^2) 0.63 cm^2   Change in Wound Size % (l*w) 62.5   Wound Volume (cm^3) 0.189 cm^3   Wound Healing % 72   Post-Procedure Length (cm) 0.9 cm   Post-Procedure Width (cm) 0.7 cm   Post-Procedure Depth (cm) 0.3 cm   Post-Procedure Surface Area (cm^2) 0.63 cm^2   Post-Procedure Volume (cm^3) 0.189 cm^3   Wound Assessment [place]/red;Slough   Drainage Amount Moderate (25-50%)   Drainage Description Serosanguinous   Odor None   Maria M-wound Assessment Edematous   Wound Thickness Description not for Pressure Injury Full thickness   Pain Assessment   Pain Assessment None - Denies Pain     Patient is currently taking Aspirin 81 mg. Pre- and post-debriedement

## 2024-05-24 NOTE — WOUND CARE
Discharge Instructions for  [facility]  [address] [place] [address]  [address]  [place] [address]  Phone [phone]   Fax [phone]      NAME:  Gilbert Yo  YOB: 1960  MEDICAL RECORD NUMBER:  [id number]  DATE:  5/24/2024    Return Appointment:   1 week with Abiel Nicholas DO    Left Knee:  Cleanse wound with Normal Saline.  Vashe moistened gauze to wound bed and periwound for 1 minute   Apply collagen. Cut product to approximately size and apply to wound bed.   Cover with Hydrofera Ready: Cut to greater than wound size, place in wound bed, shiny side out.   Secure with adhesive bandage  Change 3x weekly or as needed.  Follow dressing with Tubigrip to bilateral lower legs.    Compression: Tubigrip from base of toes to mid thigh using E, F, and G to graduate the compression appropriately.  Elevate lower legs above level of heart when not ambulating. Activity is beneficial.  Protein 100 g daily throughout the day.          Should you experience increased redness, swelling, pain, foul odor, size of wound(s), or have a temperature over 101 degrees please contact the Wound Healing Center at [phone] or if after hours contact your primary care physician or go to the hospital emergency department.    PLEASE NOTE: IF YOU ARE UNABLE TO OBTAIN WOUND SUPPLIES, CONTINUE TO USE THE SUPPLIES YOU HAVE AVAILABLE UNTIL YOU ARE ABLE TO REACH US. IT IS MOST IMPORTANT TO KEEP THE WOUND COVERED AT ALL TIMES.    Electronically signed Kelly Kendall PT, [facility] on 5/24/2024 at 8:44 AM

## 2024-05-28 ENCOUNTER — HOSPITAL ENCOUNTER (OUTPATIENT)
Dept: PHYSICAL THERAPY | Age: 64
Setting detail: RECURRING SERIES
Discharge: HOME | End: 2024-05-31
Attending: ORTHOPAEDIC SURGERY
Payer: COMMERCIAL

## 2024-05-28 PROCEDURE — 97110 THERAPEUTIC EXERCISES: CPT

## 2024-05-28 ASSESSMENT — PAIN SCALES - GENERAL: PAINLEVEL_OUTOF10: 0

## 2024-05-28 NOTE — PROGRESS NOTES
Gilbert Yo  : 1960  Primary: [place] [employer] [place] [employer] [place] [employer]  [facility]  [facility]  [address]  [address]  [facility] [address]  Phone: [phone]  Fax: [phone] Plan Frequency: 2-3x/week x 90 days  Plan of Care/Certification Expiration Date: 24        Plan of Care/Certification Expiration Date:  Plan of Care/Certification Expiration Date: 24    Frequency/Duration: Plan Frequency: 2-3x/week x 90 days      Time In/Out:   Time In: 1112  Time Out: 1159      PT Visit Info:    Progress Note Due Date: 24  Total # of Visits to Date: 5  No Show: 1      Visit Count:  5    OUTPATIENT PHYSICAL THERAPY:   Treatment Note 2024       Episode  (S/P L TKA 24; Revision 3/8/24)               Treatment Diagnosis:    Muscle weakness (generalized)  Difficulty in walking, not elsewhere classified  Swelling of knee  Chronic pain of left knee  Medical/Referring Diagnosis:    Status post left knee replacement    Referring Physician:  Hal Dominguez Jr., MD MD Orders:  PT Eval and Treat   Return MD Appt:  24   Date of Onset:  Onset Date: 24     Allergies:   Patient has no known allergies.  Restrictions/Precautions:   None  Saw wound care today and doesn't go back for 6 months now.        Interventions Planned (Treatment may consist of any combination of the following):     See Assessment Note    Subjective Comments:   Patient reported minimal to no knee pain. Has been moving into apartment and is more tired today. Also, said he had fall while moving but \"tumbled\" to his R side. Is feeling okay today.  Some difficulty going up/down stairs and donning socks.   Initial Pain Level::     0/10  Post Session Pain Level:       0/10  Medications Last Reviewed:  2024  Updated Objective Findings:  None today  Treatment   THERAPEUTIC EXERCISE: (42minutes):    Exercises per grid below to improve mobility, strength, and balance.  Required moderate visual and verbal cues to

## 2024-05-30 ENCOUNTER — HOSPITAL ENCOUNTER (OUTPATIENT)
Dept: PHYSICAL THERAPY | Age: 64
Setting detail: RECURRING SERIES
End: 2024-05-30
Attending: ORTHOPAEDIC SURGERY
Payer: COMMERCIAL

## 2024-05-30 PROCEDURE — 97110 THERAPEUTIC EXERCISES: CPT

## 2024-05-30 ASSESSMENT — PAIN SCALES - GENERAL: PAINLEVEL_OUTOF10: 0

## 2024-06-03 ENCOUNTER — HOSPITAL ENCOUNTER (OUTPATIENT)
Dept: PHYSICAL THERAPY | Age: 64
Setting detail: RECURRING SERIES
Discharge: HOME | End: 2024-06-06
Attending: ORTHOPAEDIC SURGERY
Payer: COMMERCIAL

## 2024-06-03 PROCEDURE — 97110 THERAPEUTIC EXERCISES: CPT

## 2024-06-03 NOTE — PROGRESS NOTES
Gilbert Yo  : 1960  Primary[place] [employer] [place] [employer] [place] [employer]  [facility]  [facility]  [address]  [address]  [facility] [address]  Phone: [phone]  Fax: [phone] Plan Frequency: 2-3x/week x 90 days  Plan of Care/Certification Expiration Date: 24        Plan of Care/Certification Expiration Date:  Plan of Care/Certification Expiration Date: 24    Frequency/Duration: Plan Frequency: 2-3x/week x 90 days      Time In/Out:   Time In: 1110  Time Out: 1156      PT Visit Info:    Progress Note Due Date: 24  Total # of Visits to Date: 6  No Show: 1      Visit Count:  7    OUTPATIENT PHYSICAL THERAPY:   Treatment Note 6/3/2024       Episode  (S/P L TKA 24; Revision 3/8/24)               Treatment Diagnosis:    Muscle weakness (generalized)  Difficulty in walking, not elsewhere classified  Swelling of knee  Chronic pain of left knee  Medical/Referring Diagnosis:    Status post left knee replacement    Referring Physician:  Hal Dominguez Jr., MD MD Orders:  PT Eval and Treat   Return MD Appt:  24   Date of Onset:  Onset Date: 24     Allergies:   Patient has no known allergies.  Restrictions/Precautions:   None  Saw wound care today and doesn't go back for 6 months now.        Interventions Planned (Treatment may consist of any combination of the following):     See Assessment Note    Subjective Comments:   Patient reported no complaints today. Said his knee is continuing to heal.  Initial Pain Level::   0   /10  Post Session Pain Level:       1/10  Medications Last Reviewed:  6/3/2024  Updated Objective Findings:  None today  Treatment   THERAPEUTIC EXERCISE: (43minutes):    Exercises per grid below to improve mobility, strength, and balance.  Required moderate visual and verbal cues to  ensure correct performance .  Progressed resistance and complexity of movement as indicated.  MANUAL THERAPY: (0 minutes):   Joint mobilization and Soft tissue

## 2024-06-03 NOTE — PROGRESS NOTES
Gilbert Yo  : 1960  Primary: [place] [employer] [place] [employer] [place] [employer]  Secondary:  O [facility]  [address]  [address]  [facility] [address]  Phone: [phone]  Fax: [phone] Plan Frequency: 2-3x/week x 90 days  Plan of Care/Certification Expiration Date: 24        Plan of Care/Certification Expiration Date:  Plan of Care/Certification Expiration Date: 24    Frequency/Duration: Plan Frequency: 2-3x/week x 90 days      Time In/Out:   Time In: 1115  Time Out: 1200      PT Visit Info:    Progress Note Due Date: 24  Total # of Visits to Date: 6  No Show: 1      Visit Count:  6    OUTPATIENT PHYSICAL THERAPY:   Treatment Note 2024       Episode  (S/P L TKA 24; Revision 3/8/24)               Treatment Diagnosis:    Muscle weakness (generalized)  Difficulty in walking, not elsewhere classified  Swelling of knee  Chronic pain of left knee  Medical/Referring Diagnosis:        Referring Physician:  Hal Dominguez Jr., MD MD Orders:  PT Eval and Treat   Return MD Appt:  24   Date of Onset:  Onset Date: 24     Allergies:   Patient has no known allergies.  Restrictions/Precautions:   None  Saw wound care today and doesn't go back for 6 months now.        Interventions Planned (Treatment may consist of any combination of the following):     See Assessment Note    Subjective Comments:   Patient continues to reported minimal to no knee pain. He feels leg swelling is improved. Still reports no issues from his fall while moving.  Initial Pain Level::     0/10  Post Session Pain Level:       0/10  Medications Last Reviewed:  2024  Updated Objective Findings:  None today  Treatment   THERAPEUTIC EXERCISE: (40 minutes):    Exercises per grid below to improve mobility, strength, and balance.  Required moderate visual and verbal cues to  ensure correct performance .  Progressed resistance and complexity of movement as indicated.        Date:  24 Date:  24

## 2024-06-04 ENCOUNTER — HOSPITAL ENCOUNTER (OUTPATIENT)
Dept: WOUND CARE | Age: 64
Discharge: HOME | End: 2024-06-04
Payer: COMMERCIAL

## 2024-06-04 VITALS
HEART RATE: 87 BPM | HEIGHT: 74 IN | WEIGHT: 315 LBS | OXYGEN SATURATION: 98 % | DIASTOLIC BLOOD PRESSURE: 64 MMHG | BODY MASS INDEX: 40.43 KG/M2 | TEMPERATURE: 98.2 F | SYSTOLIC BLOOD PRESSURE: 101 MMHG | RESPIRATION RATE: 16 BRPM

## 2024-06-04 DIAGNOSIS — E08.622 DIABETIC ULCER OF LEFT LOWER LEG ASSOCIATED WITH DIABETES MELLITUS DUE TO UNDERLYING CONDITION, WITH FAT LAYER EXPOSED (HCC): ICD-10-CM

## 2024-06-04 DIAGNOSIS — S81.002A OPEN WOUND OF LEFT KNEE, INITIAL ENCOUNTER: Primary | ICD-10-CM

## 2024-06-04 DIAGNOSIS — L97.922 DIABETIC ULCER OF LEFT LOWER LEG ASSOCIATED WITH DIABETES MELLITUS DUE TO UNDERLYING CONDITION, WITH FAT LAYER EXPOSED (HCC): ICD-10-CM

## 2024-06-04 DIAGNOSIS — I87.392 CHRONIC VENOUS HYPERTENSION (IDIOPATHIC) WITH OTHER COMPLICATIONS OF LEFT LOWER EXTREMITY: ICD-10-CM

## 2024-06-04 PROCEDURE — 6370000000 HC RX 637 (ALT 250 FOR IP): Performed by: FAMILY MEDICINE

## 2024-06-04 PROCEDURE — 11042 DBRDMT SUBQ TIS 1ST 20SQCM/<: CPT | Performed by: FAMILY MEDICINE

## 2024-06-04 PROCEDURE — 11042 DBRDMT SUBQ TIS 1ST 20SQCM/<: CPT

## 2024-06-04 RX ORDER — LIDOCAINE 40 MG/G
CREAM TOPICAL ONCE
OUTPATIENT
Start: 2024-06-04 | End: 2024-06-04

## 2024-06-04 RX ORDER — BETAMETHASONE DIPROPIONATE 0.5 MG/G
CREAM TOPICAL ONCE
OUTPATIENT
Start: 2024-06-04 | End: 2024-06-04

## 2024-06-04 RX ORDER — CLOBETASOL PROPIONATE 0.5 MG/G
OINTMENT TOPICAL ONCE
OUTPATIENT
Start: 2024-06-04 | End: 2024-06-04

## 2024-06-04 RX ORDER — IBUPROFEN 200 MG
TABLET ORAL ONCE
OUTPATIENT
Start: 2024-06-04 | End: 2024-06-04

## 2024-06-04 RX ORDER — LIDOCAINE 50 MG/G
OINTMENT TOPICAL ONCE
OUTPATIENT
Start: 2024-06-04 | End: 2024-06-04

## 2024-06-04 RX ORDER — TRIAMCINOLONE ACETONIDE 1 MG/G
OINTMENT TOPICAL ONCE
OUTPATIENT
Start: 2024-06-04 | End: 2024-06-04

## 2024-06-04 RX ORDER — LIDOCAINE HYDROCHLORIDE 20 MG/ML
JELLY TOPICAL ONCE
OUTPATIENT
Start: 2024-06-04 | End: 2024-06-04

## 2024-06-04 RX ORDER — GENTAMICIN SULFATE 1 MG/G
OINTMENT TOPICAL ONCE
OUTPATIENT
Start: 2024-06-04 | End: 2024-06-04

## 2024-06-04 RX ORDER — LIDOCAINE 50 MG/G
OINTMENT TOPICAL ONCE
Status: COMPLETED | OUTPATIENT
Start: 2024-06-04 | End: 2024-06-04

## 2024-06-04 RX ORDER — LIDOCAINE HYDROCHLORIDE 40 MG/ML
SOLUTION TOPICAL ONCE
OUTPATIENT
Start: 2024-06-04 | End: 2024-06-04

## 2024-06-04 RX ORDER — GINSENG 100 MG
CAPSULE ORAL ONCE
OUTPATIENT
Start: 2024-06-04 | End: 2024-06-04

## 2024-06-04 RX ORDER — BACITRACIN ZINC AND POLYMYXIN B SULFATE 500; 1000 [USP'U]/G; [USP'U]/G
OINTMENT TOPICAL ONCE
OUTPATIENT
Start: 2024-06-04 | End: 2024-06-04

## 2024-06-04 RX ORDER — SODIUM CHLOR/HYPOCHLOROUS ACID 0.033 %
SOLUTION, IRRIGATION IRRIGATION ONCE
OUTPATIENT
Start: 2024-06-04 | End: 2024-06-04

## 2024-06-04 RX ADMIN — LIDOCAINE: 50 OINTMENT TOPICAL at 08:48

## 2024-06-04 ASSESSMENT — PAIN SCALES - GENERAL: PAINLEVEL_OUTOF10: 0

## 2024-06-04 NOTE — DISCHARGE INSTRUCTIONS
Return Appointment:   1 week with Abiel Nicholas DO     Left Knee:  Cleanse wound with Normal Saline.  Vashe moistened gauze to wound bed and periwound for 1 minute   Apply collagen. Cut product to approximately size and apply to wound bed.   Cover with Hydrofera Ready: Cut to greater than wound size, place in wound bed, shiny side out.   Secure with adhesive bandage  Change 3x weekly or as needed.  Follow dressing with Tubigrip to bilateral lower legs.     Compression: Tubigrip from base of toes to mid thigh using E, F, and G to graduate the compression appropriately.  Elevate lower legs above level of heart when not ambulating. Activity is beneficial.  Protein 100 g daily throughout the day.   Obtain pre-authorization for Ep[employer] to apply at next visit, pending insurance approval.

## 2024-06-04 NOTE — WOUND CARE
Discharge Instructions for  [facility]  [address] [place] [address]  [address]  [place] [address]  Phone [phone]   Fax [phone]      NAME:  Gilbert Yo  YOB: 1960  MEDICAL RECORD NUMBER:  [id number]  DATE:  6/4/2024    Return Appointment:   1 week with Abiel Nicholas DO    Left Knee:  Cleanse wound with Normal Saline.  Vashe moistened gauze to wound bed and periwound for 1 minute   Apply collagen. Cut product to approximately size and apply to wound bed.   Cover with Hydrofera Ready: Cut to greater than wound size, place in wound bed, shiny side out.   Secure with adhesive bandage  Change 3x weekly or as needed.  Follow dressing with Tubigrip to bilateral lower legs.    Compression: Tubigrip from base of toes to mid thigh using E, F, and G to graduate the compression appropriately.  Elevate lower legs above level of heart when not ambulating. Activity is beneficial.  Protein 100 g daily throughout the day.   Obtain pre-authorization for Epifix to apply at next visit, pending insurance approval.       Should you experience increased redness, swelling, pain, foul odor, size of wound(s), or have a temperature over 101 degrees please contact the Wound Healing Center at [phone] or if after hours contact your primary care physician or go to the hospital emergency department.    PLEASE NOTE: IF YOU ARE UNABLE TO OBTAIN WOUND SUPPLIES, CONTINUE TO USE THE SUPPLIES YOU HAVE AVAILABLE UNTIL YOU ARE ABLE TO REACH US. IT IS MOST IMPORTANT TO KEEP THE WOUND COVERED AT ALL TIMES.    Electronically signed Kelly Kendall PT, [facility] on 6/4/2024 at 8:44 AM

## 2024-06-04 NOTE — FLOWSHEET NOTE
06/04/24 0836   Left Leg Edema Point of Measurement   Leg circumference 51.5 cm   Ankle circumference 28 cm   Foot circumference 26 cm   Compression Therapy Tubular elastic support bandage   Wound 04/03/24 Knee Left;Anterior #1   Date First Assessed/Time First Assessed: 04/03/24 1129   Present on Original Admission: Yes  Wound Approximate Age at First Assessment (Weeks): 3.5 weeks  Primary Wound Type: (c) Diabetic Ulcer  Location: Knee  Wound Location Orientation: Left;Anterio...   Wound Image     Wound Etiology Diabetic Lee 2   Dressing Status Old drainage noted   Wound Cleansed Cleansed with saline   Dressing/Treatment Collagen;Hydrofera blue   Wound Length (cm) 1.3 cm   Wound Width (cm) 1.2 cm   Wound Depth (cm) 0.3 cm   Wound Surface Area (cm^2) 1.56 cm^2   Change in Wound Size % (l*w) 7.14   Wound Volume (cm^3) 0.468 cm^3   Wound Healing % 30   Post-Procedure Length (cm) 1.3 cm   Post-Procedure Width (cm) 1.2 cm   Post-Procedure Depth (cm) 0.3 cm   Post-Procedure Surface Area (cm^2) 1.56 cm^2   Post-Procedure Volume (cm^3) 0.468 cm^3   Wound Assessment [place] Garcia/red;Slough   Drainage Amount Small (< 25%)   Drainage Description Serosanguinous   Odor None   Maria M-wound Assessment Edematous   Wound Thickness Description not for Pressure Injury Full thickness   Pain Assessment   Pain Assessment None - Denies Pain     Patient is currently taking Aspirin 81 mg. Pre- and post-debridement

## 2024-06-05 ENCOUNTER — HOSPITAL ENCOUNTER (OUTPATIENT)
Dept: PHYSICAL THERAPY | Age: 64
Setting detail: RECURRING SERIES
Discharge: HOME | End: 2024-06-08
Attending: ORTHOPAEDIC SURGERY
Payer: COMMERCIAL

## 2024-06-05 PROBLEM — L97.922 DIABETIC ULCER OF LEFT LOWER LEG ASSOCIATED WITH DIABETES MELLITUS DUE TO UNDERLYING CONDITION, WITH FAT LAYER EXPOSED (HCC): Chronic | Status: ACTIVE | Noted: 2024-06-05

## 2024-06-05 PROBLEM — L97.922 DIABETIC ULCER OF LEFT LOWER LEG ASSOCIATED WITH DIABETES MELLITUS DUE TO UNDERLYING CONDITION, WITH FAT LAYER EXPOSED (HCC): Status: ACTIVE | Noted: 2024-06-05

## 2024-06-05 PROBLEM — I87.392 CHRONIC VENOUS HYPERTENSION (IDIOPATHIC) WITH OTHER COMPLICATIONS OF LEFT LOWER EXTREMITY: Chronic | Status: ACTIVE | Noted: 2024-06-05

## 2024-06-05 PROBLEM — E08.622 DIABETIC ULCER OF LEFT LOWER LEG ASSOCIATED WITH DIABETES MELLITUS DUE TO UNDERLYING CONDITION, WITH FAT LAYER EXPOSED (HCC): Chronic | Status: ACTIVE | Noted: 2024-06-05

## 2024-06-05 PROBLEM — E08.622 DIABETIC ULCER OF LEFT LOWER LEG ASSOCIATED WITH DIABETES MELLITUS DUE TO UNDERLYING CONDITION, WITH FAT LAYER EXPOSED (HCC): Status: ACTIVE | Noted: 2024-06-05

## 2024-06-05 PROBLEM — I87.392 CHRONIC VENOUS HYPERTENSION (IDIOPATHIC) WITH OTHER COMPLICATIONS OF LEFT LOWER EXTREMITY: Status: ACTIVE | Noted: 2024-06-05

## 2024-06-05 PROCEDURE — 97110 THERAPEUTIC EXERCISES: CPT

## 2024-06-05 ASSESSMENT — PAIN SCALES - GENERAL: PAINLEVEL_OUTOF10: 0

## 2024-06-05 NOTE — PROGRESS NOTES
performed by Hal Dominguez Jr., MD at [facility]    TOTAL KNEE ARTHROPLASTY Left 2024    lt KNEE TOTAL ARTHROPLASTY ROBOTIC performed by Hal Dominguez Jr., MD at [facility]       FAMILY HISTORY    Family History   Problem Relation Age of Onset    Psychiatric Disorder Mother         depression    Pulmonary Embolism Brother     Diabetes Father         type 2;insulin dependent    Arrhythmia Father         Atrial fibrillation       SOCIAL HISTORY    Social History     Tobacco Use    Smoking status: Former     Current packs/day: 0.00     Types: Cigarettes     Quit date: 1991     Years since quittin.4     Passive exposure: Past    Smokeless tobacco: Never    Tobacco comments:     Quit smoking in ; 1 ppd for 10 years   Vaping Use    Vaping Use: Never used   Substance Use Topics    Alcohol use: Yes     Alcohol/week: 5.0 - 7.0 standard drinks of alcohol     Comment: occasional    Drug use: No       ALLERGIES    No Known Allergies    MEDICATIONS    Current Outpatient Medications on File Prior to Encounter   Medication Sig Dispense Refill    furosemide (LASIX) 20 MG tablet Take 1 tablet by mouth 2 times daily for 10 days 20 tablet 0    cefadroxil (DURICEF) 500 MG capsule Take 2 capsules by mouth 2 times daily      zolpidem (AMBIEN) 10 MG tablet TAKE ONE TABLET BY MOUTH AT BEDTIME AS NEEDED FOR INSOMNIA      busPIRone (BUSPAR) 10 MG tablet Take 10 mg by mouth nightly. Take one to two tablets at night for sleep      QUEtiapine (SEROQUEL) 50 MG tablet Take 50 mg by mouth nightly. take one at night      irbesartan (AVAPRO) 300 MG tablet Take 1 tablet by mouth daily      buPROPion (WELLBUTRIN SR) 150 MG extended release tablet Take 150 mg by mouth daily.      celecoxib (CELEBREX) 200 MG capsule Take 200 mg by mouth 2 times daily.      vitamin D (VITAMIN D3) 125 MCG (5000 UT) CAPS capsule Take 1 capsule by mouth daily.      Sodium Chloride Flush (SALINE FLUSH IV) Infuse 10 mLs intravenously daily.

## 2024-06-05 NOTE — PROGRESS NOTES
Gilbert Yo  : 1960  Primary: [place] [employer] [place] [employer]  Secondary:  O [facility]  [address]  [address]  [facility] [address]  Phone: [phone]  Fax: [phone] Plan Frequency: 2-3x/week x 90 days  Plan of Care/Certification Expiration Date: 24        Plan of Care/Certification Expiration Date:  Plan of Care/Certification Expiration Date: 24    Frequency/Duration: Plan Frequency: 2-3x/week x 90 days      Time In/Out:   Time In: 1125  Time Out: 1203      PT Visit Info:    Progress Note Due Date: 24  Total # of Visits to Date: 7  No Show: 1      Visit Count:  8    OUTPATIENT PHYSICAL THERAPY:   Treatment Note 2024       Episode  (S/P L TKA 24; Revision 3/8/24)               Treatment Diagnosis:    Muscle weakness (generalized)  Difficulty in walking, not elsewhere classified  Swelling of knee  Chronic pain of left knee  Medical/Referring Diagnosis:    Status post left knee replacement    Referring Physician:  Hal Dominguez Jr., MD MD Orders:  PT Eval and Treat   Return MD Appt:  24   Date of Onset:  Onset Date: 24     Allergies:   Patient has no known allergies.  Restrictions/Precautions:   None  Saw wound care today and doesn't go back for 6 months now.        Interventions Planned (Treatment may consist of any combination of the following):     See Assessment Note    Subjective Comments:   Patient stated no complaints today.  Initial Pain Level::   0  0/10  Post Session Pain Level:       0/10  Medications Last Reviewed:  2024  Updated Objective Findings:  None today  Treatment   THERAPEUTIC EXERCISE: (36minutes):    Exercises per grid below to improve mobility, strength, and balance.  Required moderate visual and verbal cues to  ensure correct performance .  Progressed resistance and complexity of movement as indicated.  MANUAL THERAPY: (0 minutes):   Joint mobilization and Soft tissue mobilization was utilized and necessary because

## 2024-06-07 ENCOUNTER — HOSPITAL ENCOUNTER (OUTPATIENT)
Dept: PHYSICAL THERAPY | Age: 64
Setting detail: RECURRING SERIES
End: 2024-06-07
Attending: ORTHOPAEDIC SURGERY
Payer: COMMERCIAL

## 2024-06-10 ENCOUNTER — HOSPITAL ENCOUNTER (OUTPATIENT)
Dept: PHYSICAL THERAPY | Age: 64
Setting detail: RECURRING SERIES
Discharge: HOME | End: 2024-06-13
Attending: ORTHOPAEDIC SURGERY
Payer: COMMERCIAL

## 2024-06-10 PROCEDURE — 97110 THERAPEUTIC EXERCISES: CPT

## 2024-06-10 NOTE — PROGRESS NOTES
Gilbert Yo  : 1960  Primary: [place] [employer] [place] [employer]  Secondary:  [facility]  [address]  [address]  [facility] [address]  Phone: [phone]  Fax: [phone] Plan Frequency: 2-3x/week x 90 days  Plan of Care/Certification Expiration Date: 24        Plan of Care/Certification Expiration Date:  Plan of Care/Certification Expiration Date: 24    Frequency/Duration: Plan Frequency: 2-3x/week x 90 days      Time In/Out:   Time In: 1117  Time Out: 1202      PT Visit Info:    Progress Note Due Date: 24  Total # of Visits to Date: 8  No Show: 1  Canceled Appointment: 1      Visit Count:  9    OUTPATIENT PHYSICAL THERAPY:   Treatment Note 6/10/2024       Episode  (S/P L TKA 24; Revision 3/8/24)               Treatment Diagnosis:    Muscle weakness (generalized)  Difficulty in walking, not elsewhere classified  Swelling of knee  Chronic pain of left knee  Medical/Referring Diagnosis:    Status post left knee replacement    Referring Physician:  Hal Dominguez Jr., MD MD Orders:  PT Eval and Treat   Return MD Appt:  24   Date of Onset:  Onset Date: 24     Allergies:   Patient has no known allergies.  Restrictions/Precautions:   None  Saw wound care today and doesn't go back for 6 months now.        Interventions Planned (Treatment may consist of any combination of the following):     See Assessment Note    Subjective Comments:   Patient said knees were sore this morning but have been doing okay.  Initial Pain Level::   0   /10  Post Session Pain Level:       0/10  Medications Last Reviewed:  6/10/2024  Updated Objective Findings:  None today  Treatment   THERAPEUTIC EXERCISE: (42minutes):    Exercises per grid below to improve mobility, strength, and balance.  Required moderate visual and verbal cues to  ensure correct performance .  Progressed resistance and complexity of movement as indicated.  MANUAL THERAPY: (0 minutes):   Joint mobilization and

## 2024-06-11 ENCOUNTER — HOSPITAL ENCOUNTER (OUTPATIENT)
Dept: WOUND CARE | Age: 64
Discharge: HOME | End: 2024-06-11
Payer: COMMERCIAL

## 2024-06-11 VITALS
HEIGHT: 74 IN | BODY MASS INDEX: 40.43 KG/M2 | WEIGHT: 315 LBS | TEMPERATURE: 98.3 F | DIASTOLIC BLOOD PRESSURE: 65 MMHG | RESPIRATION RATE: 18 BRPM | SYSTOLIC BLOOD PRESSURE: 147 MMHG | HEART RATE: 66 BPM

## 2024-06-11 DIAGNOSIS — S81.002A OPEN WOUND OF LEFT KNEE, INITIAL ENCOUNTER: Primary | ICD-10-CM

## 2024-06-11 PROCEDURE — 11042 DBRDMT SUBQ TIS 1ST 20SQCM/<: CPT

## 2024-06-11 PROCEDURE — 6370000000 HC RX 637 (ALT 250 FOR IP): Performed by: FAMILY MEDICINE

## 2024-06-11 RX ORDER — LIDOCAINE HYDROCHLORIDE 20 MG/ML
JELLY TOPICAL ONCE
OUTPATIENT
Start: 2024-06-11 | End: 2024-06-11

## 2024-06-11 RX ORDER — BETAMETHASONE DIPROPIONATE 0.5 MG/G
CREAM TOPICAL ONCE
OUTPATIENT
Start: 2024-06-11 | End: 2024-06-11

## 2024-06-11 RX ORDER — GENTAMICIN SULFATE 1 MG/G
OINTMENT TOPICAL ONCE
OUTPATIENT
Start: 2024-06-11 | End: 2024-06-11

## 2024-06-11 RX ORDER — GINSENG 100 MG
CAPSULE ORAL ONCE
OUTPATIENT
Start: 2024-06-11 | End: 2024-06-11

## 2024-06-11 RX ORDER — LIDOCAINE 50 MG/G
OINTMENT TOPICAL ONCE
OUTPATIENT
Start: 2024-06-11 | End: 2024-06-11

## 2024-06-11 RX ORDER — TRIAMCINOLONE ACETONIDE 1 MG/G
OINTMENT TOPICAL ONCE
OUTPATIENT
Start: 2024-06-11 | End: 2024-06-11

## 2024-06-11 RX ORDER — LIDOCAINE HYDROCHLORIDE 40 MG/ML
SOLUTION TOPICAL ONCE
OUTPATIENT
Start: 2024-06-11 | End: 2024-06-11

## 2024-06-11 RX ORDER — BACITRACIN ZINC AND POLYMYXIN B SULFATE 500; 1000 [USP'U]/G; [USP'U]/G
OINTMENT TOPICAL ONCE
OUTPATIENT
Start: 2024-06-11 | End: 2024-06-11

## 2024-06-11 RX ORDER — CLOBETASOL PROPIONATE 0.5 MG/G
OINTMENT TOPICAL ONCE
OUTPATIENT
Start: 2024-06-11 | End: 2024-06-11

## 2024-06-11 RX ORDER — LIDOCAINE 40 MG/G
CREAM TOPICAL ONCE
OUTPATIENT
Start: 2024-06-11 | End: 2024-06-11

## 2024-06-11 RX ORDER — IBUPROFEN 200 MG
TABLET ORAL ONCE
OUTPATIENT
Start: 2024-06-11 | End: 2024-06-11

## 2024-06-11 RX ORDER — SODIUM CHLOR/HYPOCHLOROUS ACID 0.033 %
SOLUTION, IRRIGATION IRRIGATION ONCE
OUTPATIENT
Start: 2024-06-11 | End: 2024-06-11

## 2024-06-11 RX ORDER — LIDOCAINE 50 MG/G
OINTMENT TOPICAL ONCE
Status: COMPLETED | OUTPATIENT
Start: 2024-06-11 | End: 2024-06-11

## 2024-06-11 RX ADMIN — LIDOCAINE: 50 OINTMENT TOPICAL at 08:41

## 2024-06-11 NOTE — WOUND CARE
Discharge Instructions for  [facility]  [address] [place] [address]  [address] [place] [address]  Phone [phone]   Fax [phone]      NAME:  Gilbert Yo  YOB: 1960  MEDICAL RECORD NUMBER:  [id number]  DATE:  6/11/2024    Return Appointment:   2 weeks with Abiel Nicholas DO    Left Knee:  Cleanse wound with Normal Saline.  Vashe moistened gauze to wound bed and periwound for 1 minute   Apply collagen. Cut product to approximately size and apply to wound bed.   Cover with Hydrofera Ready: Cut to greater than wound size, place in wound bed, shiny side out.   Secure with adhesive bandage or cloth tape.  Change 3x weekly or as needed.  Follow dressing with Tubigrip to bilateral lower legs.    Compression: Tubigrip from base of toes to mid thigh using E, F, and G to graduate the compression appropriately.  Elevate lower legs above level of heart when not ambulating. Activity is beneficial.  Protein 100 g daily throughout the day.     Approved for Epifix if needed in the future.        Should you experience increased redness, swelling, pain, foul odor, size of wound(s), or have a temperature over 101 degrees please contact the [facility] Center at [phone] or if after hours contact your primary care physician or go to the hospital emergency department.    PLEASE NOTE: IF YOU ARE UNABLE TO OBTAIN WOUND SUPPLIES, CONTINUE TO USE THE SUPPLIES YOU HAVE AVAILABLE UNTIL YOU ARE ABLE TO REACH US. IT IS MOST IMPORTANT TO KEEP THE WOUND COVERED AT ALL TIMES.    Electronically signed Sarah Wills RN, [facility] on 6/11/2024 at 8:32 AM

## 2024-06-11 NOTE — PROGRESS NOTES
Gilbert Yo  : 1960  Primary[place] [employer] [place] [employer]  [facility]:  [facility]  [address]  [address]  [facility] [address]  Phone: [phone]  Fax: [phone] Plan Frequency: 2-3x/week x 90 days  Plan of Care/Certification Expiration Date: 24        Plan of Care/Certification Expiration Date:  Plan of Care/Certification Expiration Date: 24    Frequency/Duration: Plan Frequency: 2-3x/week x 90 days      Time In/Out:   Time In: 1102  Time Out: 1148      PT Visit Info:    Progress Note Due Date: 24  Total # of Visits to Date: 11  No Show: 1  Canceled Appointment: 1      Visit Count:  11    OUTPATIENT PHYSICAL THERAPY:   Treatment Note 2024       Episode  (S/P L TKA 24; Revision 3/8/24)               Treatment Diagnosis:    Muscle weakness (generalized)  Difficulty in walking, not elsewhere classified  Swelling of knee  Chronic pain of left knee  Medical/Referring Diagnosis:    Status post left knee replacement    Referring Physician:  Hal Dominguez Jr., MD MD Orders:  PT Eval and Treat   Return MD Appt:  24   Date of Onset:  Onset Date: 24     Allergies:   Patient has no known allergies.  Restrictions/Precautions:   None  Saw wound care today and doesn't go back for 6 months now.        Interventions Planned (Treatment may consist of any combination of the following):     See Assessment Note    Subjective Comments:   Patient reports L knee is doing well, but has some pain in R knee.   Initial Pain Level:: Left 0Knee, Leg 0/10  Post Session Pain Level:  Left  Knee, Leg 0/10  Medications Last Reviewed:  2024  Updated Objective Findings:  None today  Treatment   THERAPEUTIC EXERCISE: (42minutes):    Exercises per grid below to improve mobility, strength, and balance.  Required moderate visual and verbal cues to  ensure correct performance .  Progressed resistance and complexity of movement as indicated.  MANUAL THERAPY: (0 minutes):

## 2024-06-11 NOTE — DISCHARGE INSTRUCTIONS
NAME:  Gilbert Yo  YOB: 1960  MEDICAL RECORD NUMBER:  [id number]  DATE:  6/11/2024     Return Appointment:   2 weeks with Abiel Nicholas DO     Left Knee:  Cleanse wound with Normal Saline.  Vashe moistened gauze to wound bed and periwound for 1 minute   Apply collagen. Cut product to approximately size and apply to wound bed.   Cover with Hydrofera Ready: Cut to greater than wound size, place in wound bed, shiny side out.   Secure with adhesive bandage or cloth tape.  Change 3x weekly or as needed.  Follow dressing with Tubigrip to bilateral lower legs.     Compression: Tubigrip from base of toes to mid thigh using E, F, and G to graduate the compression appropriately.  Elevate lower legs above level of heart when not ambulating. Activity is beneficial.  Protein 100 g daily throughout the day.      Approved for Epifix if needed in the future.

## 2024-06-11 NOTE — FLOWSHEET NOTE
06/11/24 0824   Left Leg Edema Point of Measurement   Leg circumference 50 cm   Ankle circumference 29 cm   Compression Therapy Tubular elastic support bandage   Wound 04/03/24 Knee Left;Anterior #1   Date First Assessed/Time First Assessed: 04/03/24 1129   Present on Original Admission: Yes  Wound Approximate Age at First Assessment (Weeks): 3.5 weeks  Primary Wound Type: (c) Diabetic Ulcer  Location: Knee  Wound Location Orientation: Left;Anterio...   Wound Image     Wound Etiology Diabetic Lee 2   Dressing Status Old drainage noted   Wound Cleansed Vashe   Dressing/Treatment Collagen;Hydrofera blue   Wound Length (cm) 1 cm   Wound Width (cm) 1 cm   Wound Depth (cm) 0.2 cm   Wound Surface Area (cm^2) 1 cm^2   Change in Wound Size % (l*w) 40.48   Wound Volume (cm^3) 0.2 cm^3   Wound Healing % 70   Post-Procedure Length (cm) 1 cm   Post-Procedure Width (cm) 1 cm   Post-Procedure Depth (cm) 0.3 cm   Post-Procedure Surface Area (cm^2) 1 cm^2   Post-Procedure Volume (cm^3) 0.3 cm^3   Wound Assessment [place]/red;Slough   Drainage Amount Moderate (25-50%)   Drainage Description Serosanguinous   Odor None   Maria M-wound Assessment Edematous   Wound Thickness Description not for Pressure Injury Full thickness     Patient is on asa daily

## 2024-06-12 ENCOUNTER — HOSPITAL ENCOUNTER (OUTPATIENT)
Dept: PHYSICAL THERAPY | Age: 64
Setting detail: RECURRING SERIES
Discharge: HOME | End: 2024-06-15
Attending: ORTHOPAEDIC SURGERY
Payer: COMMERCIAL

## 2024-06-12 PROCEDURE — 97110 THERAPEUTIC EXERCISES: CPT

## 2024-06-12 ASSESSMENT — PAIN SCALES - GENERAL: PAINLEVEL_OUTOF10: 0

## 2024-06-12 NOTE — PROGRESS NOTES
- 2 x daily - 7 x weekly - 2 sets - 10 reps  - Hooklying Heel Slide  - 2 x daily - 7 x weekly - 2 sets - 10 reps  - Supine Knee Extension Strengthening  - 2 x daily - 7 x weekly - 2 sets - 10 reps  - Supine Bridge with Mini Swiss Ball Between Knees  - 2 x daily - 7 x weekly - 2 sets - 10 reps  - Small Range Straight Leg Raise  - 2 x daily - 7 x weekly - 2 sets - 10 reps  - Long Sitting Calf Stretch with Strap  - 2 x daily - 7 x weekly - 1 sets - 5 reps - 20 hold  - Seated Calf Stretch with Strap  - 2 x daily - 7 x weekly - 1 sets - 5 reps - 20 hold    Treatment/Session Summary:    Treatment Assessment:    Pt performed therex progressions well. He was able to perform step ups from 8\" step but did require UE assistance. Plan to work on his ability to ascend/descend steps without using his arms. Overall, he is doing well with PT. Was fatigued post tx. Continue to progress.  Communication/Consultation:  None today  Equipment provided today:  None   Recommendations/Intent for next treatment session: Next visit will focus on advancements to more challenging LE stretching, strengthening and ROM activities.     >Total Treatment Billable Duration:  41 minutes therex  Time In: 1117  Time Out: 1200    BELEN JAMES PT         Charge Capture  [employer] Portal  Appt Desk  Attendance Report     Future Appointments   Date Time Provider Department Center   6/14/2024 11:00 AM Iraida Garrido PT [facility]   6/17/2024 11:15 AM Belen James, PT [facility]   6/19/2024 11:15 AM Belen James PT [facility]   6/21/2024 11:00 AM Chris Lucas PT [facility]   6/24/2024 11:15 AM Belen James, PT [facility]   6/25/2024  8:00 AM Abiel Nicholas DO [facility]   6/26/2024 11:15 AM Belen James, PT [facility]   6/28/2024 11:00 AM Iraida Garrido, PT [facility]   7/9/2024  8:30 AM Hal Dominguez Jr., MD [facility]

## 2024-06-14 ENCOUNTER — HOSPITAL ENCOUNTER (OUTPATIENT)
Dept: PHYSICAL THERAPY | Age: 64
Setting detail: RECURRING SERIES
Discharge: HOME | End: 2024-06-17
Attending: ORTHOPAEDIC SURGERY
Payer: COMMERCIAL

## 2024-06-14 PROCEDURE — 97110 THERAPEUTIC EXERCISES: CPT

## 2024-06-14 ASSESSMENT — PAIN DESCRIPTION - PAIN TYPE: TYPE: CHRONIC PAIN

## 2024-06-14 ASSESSMENT — PAIN DESCRIPTION - LOCATION: LOCATION: KNEE;LEG

## 2024-06-14 ASSESSMENT — PAIN SCALES - GENERAL: PAINLEVEL_OUTOF10: 0

## 2024-06-14 ASSESSMENT — PAIN DESCRIPTION - ORIENTATION: ORIENTATION: LEFT

## 2024-06-14 ASSESSMENT — PAIN DESCRIPTION - DESCRIPTORS: DESCRIPTORS: SORE;TIGHTNESS;ACHING

## 2024-06-17 ENCOUNTER — HOSPITAL ENCOUNTER (OUTPATIENT)
Dept: PHYSICAL THERAPY | Age: 64
Setting detail: RECURRING SERIES
Discharge: HOME | End: 2024-06-20
Attending: ORTHOPAEDIC SURGERY
Payer: COMMERCIAL

## 2024-06-17 PROCEDURE — 97110 THERAPEUTIC EXERCISES: CPT

## 2024-06-17 ASSESSMENT — PAIN SCALES - GENERAL: PAINLEVEL_OUTOF10: 0

## 2024-06-17 NOTE — PROGRESS NOTES
daily - 7 x weekly - 1 sets - 5 reps - 20 hold  - Seated Calf Stretch with Strap  - 2 x daily - 7 x weekly - 1 sets - 5 reps - 20 hold    Treatment/Session Summary:    Treatment Assessment:   Gilbert did well today without L knee symptoms. Less UE assistance required with step ups. Was fatigued post tx. Continue to progress as tolerated.  Communication/Consultation:  None today  Equipment provided today:  None   Recommendations/Intent for next treatment session: Next visit will focus on advancements to more challenging LE stretching, strengthening and ROM activities.     >Total Treatment Billable Duration: 40 minutes therex  Time In: 1118  Time Out: 1201    BELEN JAMES PT         Charge Capture  [employer] Portal  Appt Desk  Attendance Report     Future Appointments   Date Time Provider Department Center   6/19/2024 11:15 AM Belen James, PT [facility]   6/21/2024 11:00 AM Chris Lucas, PT [facility]   6/24/2024 11:15 AM Belen James, PT [facility]   6/25/2024  8:00 AM Abiel Nicholas, DO [facility]   6/26/2024 11:15 AM Belen James, PT [facility]   6/28/2024 11:00 AM Iraida Garrido, PT [facility]   7/9/2024  8:30 AM Hal Dominguez Jr., MD [facility]

## 2024-06-19 ENCOUNTER — HOSPITAL ENCOUNTER (OUTPATIENT)
Dept: PHYSICAL THERAPY | Age: 64
Setting detail: RECURRING SERIES
Discharge: HOME | End: 2024-06-22
Attending: ORTHOPAEDIC SURGERY
Payer: COMMERCIAL

## 2024-06-19 PROCEDURE — 97110 THERAPEUTIC EXERCISES: CPT

## 2024-06-19 ASSESSMENT — PAIN SCALES - GENERAL: PAINLEVEL_OUTOF10: 0

## 2024-06-19 NOTE — PROGRESS NOTES
Gilbert Yo  : 1960  Primary: [place] [employer] [place] [employer]  Secondary:  [facility]  [address]  [address]  [facility] [address]  Phone: [phone]  Fax: [phone] Plan Frequency: 2-3x/week x 90 days  Plan of Care/Certification Expiration Date: 24        Plan of Care/Certification Expiration Date:  Plan of Care/Certification Expiration Date: 24    Frequency/Duration: Plan Frequency: 2-3x/week x 90 days      Time In/Out:   Time In: 1118  Time Out: 1202      PT Visit Info:    Progress Note Due Date: 24  Total # of Visits to Date: 12  No Show: 1  Canceled Appointment: 1      Visit Count:  13    OUTPATIENT PHYSICAL THERAPY:   Treatment Note 2024       Episode  (S/P L TKA 24; Revision 3/8/24)               Treatment Diagnosis:    Muscle weakness (generalized)  Difficulty in walking, not elsewhere classified  Swelling of knee  Chronic pain of left knee  Medical/Referring Diagnosis:    Status post left knee replacement    Referring Physician:  Hal Dominguez Jr., MD MD Orders:  PT Eval and Treat   Return MD Appt:  24   Date of Onset:  Onset Date: 24     Allergies:   Patient has no known allergies.  Restrictions/Precautions:   None  Saw wound care today and doesn't go back for 6 months now.        Interventions Planned (Treatment may consist of any combination of the following):     See Assessment Note    Subjective Comments:   Patient reports 70% improvement since beginning PT. Said he still has some stiffness in his knee in the morning, stairs and sit to stands. No knee pain. Also, reports no recent buckling in his knee.  Initial Pain Level::     0/10  Post Session Pain Level:       0/10  Medications Last Reviewed:  2024  Updated Objective Findings:  None today  Treatment   MODALITIES: ( 0 minutes )       *  Vasopneumatic Therapy utilizing the Game Ready system in order to provide analgesia and reduce inflammation and edema.     Body Part:

## 2024-06-19 NOTE — PROGRESS NOTES
Gilbert Yo  : 1960  Primary: [place] [employer] [place] [employer] [place] [employer]  Secondary:  [facility]  [address]  [address]  [facility] [address]  Phone: [phone]  Fax: [phone] Plan Frequency: 2-3x/week x 90 days  Plan of Care/Certification Expiration Date: 24        Plan of Care/Certification Expiration Date:  Plan of Care/Certification Expiration Date: 24    Frequency/Duration: Plan Frequency: 2-3x/week x 90 days      Time In/Out:   Time In: 1118  Time Out: 1202      PT Visit Info:    Progress Note Due Date: 24  Total # of Visits to Date: 12  No Show: 1  Canceled Appointment: 1      Visit Count:  13                OUTPATIENT PHYSICAL THERAPY:             Progress Report 2024               Episode (S/P L TKA 24; Revision 3/8/24)         Treatment Diagnosis:     No data found  Medical/Referring Diagnosis:    Status post left knee replacement    Referring Physician:  Hal Dominguez Jr., MD MD Orders:  PT Eval and Treat   Return MD Appt:  24  Date of Onset:  Onset Date: 24     Allergies:  Patient has no known allergies.  Restrictions/Precautions:    None  Patient has been under the care of wound care due to slow healing incision and he is a TKA revision from 24      Medications Last Reviewed:  2024        OBJECTIVE   Objective:  Gait: normalized  Incision: 1 small wound medial to incision that is dime sized. He is still being seen by wound care biweekly     LE AROM/Strength DATE  24 DATE  24    Hip Flexion R:   L:  R:   L:    Hip Abduction  R:   L:  R:   L:    Hip Extension  R:   L:  R:   L:    Knee Flexion  R: WFL  L: 106deg; 3-/5 R:   L: active 115deg, passive 120deg   Knee Extension  R: WFL  L: 5deg; 3/5 R:   L: 0 deg   Ankle Dorsiflexion  R:   L: R:   L:   Ankle Plantarflexion  R:  L: R:  L:   Flexibility: HS/Quads/GS R:  L: mod tightness in L HS/GS complex                Manual Muscle Testing Date:  24 Date:   Date:    Cheek Interpolation Flap Text: A decision was made to reconstruct the defect utilizing an interpolation axial flap and a staged reconstruction.  A telfa template was made of the defect.  This telfa template was then used to outline the Cheek Interpolation flap.  The donor area for the pedicle flap was then injected with anesthesia.  The flap was excised through the skin and subcutaneous tissue down to the layer of the underlying musculature.  The interpolation flap was carefully excised within this deep plane to maintain its blood supply.  The edges of the donor site were undermined.   The donor site was closed in a primary fashion.  The pedicle was then rotated into position and sutured.  Once the tube was sutured into place, adequate blood supply was confirmed with blanching and refill.  The pedicle was then wrapped with xeroform gauze and dressed appropriately with a telfa and gauze bandage to ensure continued blood supply and protect the attached pedicle.

## 2024-06-21 ENCOUNTER — HOSPITAL ENCOUNTER (OUTPATIENT)
Dept: PHYSICAL THERAPY | Age: 64
Setting detail: RECURRING SERIES
Discharge: HOME | End: 2024-06-24
Attending: ORTHOPAEDIC SURGERY
Payer: COMMERCIAL

## 2024-06-21 PROCEDURE — 97110 THERAPEUTIC EXERCISES: CPT

## 2024-06-21 ASSESSMENT — PAIN SCALES - GENERAL: PAINLEVEL_OUTOF10: 0

## 2024-06-21 NOTE — PROGRESS NOTES
Gilbert Yo  : 1960  Primary[place] [employer] [place] [employer]  [facility]  [facility]  [address]  [address]  [facility] [address]  Phone: [phone]  Fax: [phone] Plan Frequency: 2-3x/week x 90 days  Plan of Care/Certification Expiration Date: 24        Plan of Care/Certification Expiration Date:  Plan of Care/Certification Expiration Date: 24    Frequency/Duration: Plan Frequency: 2-3x/week x 90 days      Time In/Out:   Time In: 1110  Time Out: 1155      PT Visit Info:    Progress Note Due Date: 24  Total # of Visits to Date: 12  No Show: 1  Canceled Appointment: 1      Visit Count:  14    OUTPATIENT PHYSICAL THERAPY:   Treatment Note 2024       Episode  (S/P L TKA 24; Revision 3/8/24)               Treatment Diagnosis:    Muscle weakness (generalized)  Difficulty in walking, not elsewhere classified  Swelling of knee  Chronic pain of left knee  Medical/Referring Diagnosis:        Referring Physician:  Hal Dominguez Jr., MD MD Orders:  PT Eval and Treat   Return MD Appt:  24   Date of Onset:  Onset Date: 24     Allergies:   Patient has no known allergies.  Restrictions/Precautions:   None  Saw wound care today and doesn't go back for 6 months now.        Interventions Planned (Treatment may consist of any combination of the following):     See Assessment Note    Subjective Comments:   Patient reports 70% improvement since beginning PT. Said he still has some stiffness in his knee in the morning, stairs and sit to stands. No knee pain. Also, reports no recent buckling in his knee.  Initial Pain Level::     0/10  Post Session Pain Level:       0/10  Medications Last Reviewed:  2024  Updated Objective Findings:  None today  Treatment   MODALITIES: ( 0 minutes )       *  Vasopneumatic Therapy utilizing the Game Ready system in order to provide analgesia and reduce inflammation and edema.     Body Part: Left   Edema Measurements: Involved

## 2024-06-24 ENCOUNTER — HOSPITAL ENCOUNTER (OUTPATIENT)
Dept: PHYSICAL THERAPY | Age: 64
Setting detail: RECURRING SERIES
Discharge: HOME | End: 2024-06-27
Attending: ORTHOPAEDIC SURGERY
Payer: COMMERCIAL

## 2024-06-24 PROCEDURE — 97110 THERAPEUTIC EXERCISES: CPT

## 2024-06-24 ASSESSMENT — PAIN SCALES - GENERAL: PAINLEVEL_OUTOF10: 0

## 2024-06-24 NOTE — PROGRESS NOTES
Gilbert Yo  : 1960  Primary: [place] [employer] [place] [employer]  Secondary:  [facility]  [address]  [address]  [facility] [address]  Phone: [phone]  Fax: [phone] Plan Frequency: 2-3x/week x 90 days  Plan of Care/Certification Expiration Date: 24        Plan of Care/Certification Expiration Date:  Plan of Care/Certification Expiration Date: 24    Frequency/Duration: Plan Frequency: 2-3x/week x 90 days      Time In/Out:   Time In: 1115  Time Out: 1200      PT Visit Info:    Progress Note Due Date: 24  Total # of Visits to Date: 13  No Show: 1  Canceled Appointment: 1      Visit Count:  15    OUTPATIENT PHYSICAL THERAPY:   Treatment Note 2024       Episode  (S/P L TKA 24; Revision 3/8/24)               Treatment Diagnosis:    Muscle weakness (generalized)  Difficulty in walking, not elsewhere classified  Swelling of knee  Chronic pain of left knee  Medical/Referring Diagnosis:    Status post left knee replacement    Referring Physician:  Hal Dominguez Jr., MD MD Orders:  PT Eval and Treat   Return MD Appt:  24   Date of Onset:  Onset Date: 24     Allergies:   Patient has no known allergies.  Restrictions/Precautions:   None  Saw wound care today and doesn't go back for 6 months now.        Interventions Planned (Treatment may consist of any combination of the following):     See Assessment Note    Subjective Comments:    Pt reported his knee is doing okay today.  Initial Pain Level::     0/10  Post Session Pain Level:       0/10  Medications Last Reviewed:  2024  Updated Objective Findings:  None today  Treatment   MODALITIES: ( 0 minutes )       *  Vasopneumatic Therapy utilizing the Game Ready system in order to provide analgesia and reduce inflammation and edema.     Body Part: Left   Edema Measurements: Involved - Pre: 53.5 cm , Post: 50.0 cm       Effects of Edema on Function: Decreased ROM; increased stiffness; increased heaviness 
ambulatory

## 2024-06-25 ENCOUNTER — HOSPITAL ENCOUNTER (OUTPATIENT)
Dept: WOUND CARE | Age: 64
Discharge: HOME | End: 2024-06-25
Payer: COMMERCIAL

## 2024-06-25 VITALS
HEIGHT: 74 IN | TEMPERATURE: 98.1 F | DIASTOLIC BLOOD PRESSURE: 62 MMHG | HEART RATE: 70 BPM | WEIGHT: 315 LBS | RESPIRATION RATE: 16 BRPM | BODY MASS INDEX: 40.43 KG/M2 | SYSTOLIC BLOOD PRESSURE: 113 MMHG

## 2024-06-25 DIAGNOSIS — S81.002A OPEN WOUND OF LEFT KNEE, INITIAL ENCOUNTER: Primary | ICD-10-CM

## 2024-06-25 PROCEDURE — 11042 DBRDMT SUBQ TIS 1ST 20SQCM/<: CPT | Performed by: FAMILY MEDICINE

## 2024-06-25 PROCEDURE — 11042 DBRDMT SUBQ TIS 1ST 20SQCM/<: CPT

## 2024-06-25 PROCEDURE — 6370000000 HC RX 637 (ALT 250 FOR IP): Performed by: FAMILY MEDICINE

## 2024-06-25 RX ORDER — LIDOCAINE HYDROCHLORIDE 20 MG/ML
JELLY TOPICAL ONCE
OUTPATIENT
Start: 2024-06-25 | End: 2024-06-25

## 2024-06-25 RX ORDER — LIDOCAINE 40 MG/G
CREAM TOPICAL ONCE
OUTPATIENT
Start: 2024-06-25 | End: 2024-06-25

## 2024-06-25 RX ORDER — IBUPROFEN 200 MG
TABLET ORAL ONCE
OUTPATIENT
Start: 2024-06-25 | End: 2024-06-25

## 2024-06-25 RX ORDER — CLOBETASOL PROPIONATE 0.5 MG/G
OINTMENT TOPICAL ONCE
OUTPATIENT
Start: 2024-06-25 | End: 2024-06-25

## 2024-06-25 RX ORDER — LIDOCAINE 50 MG/G
OINTMENT TOPICAL ONCE
Status: COMPLETED | OUTPATIENT
Start: 2024-06-25 | End: 2024-06-25

## 2024-06-25 RX ORDER — GENTAMICIN SULFATE 1 MG/G
OINTMENT TOPICAL ONCE
OUTPATIENT
Start: 2024-06-25 | End: 2024-06-25

## 2024-06-25 RX ORDER — LIDOCAINE HYDROCHLORIDE 40 MG/ML
SOLUTION TOPICAL ONCE
OUTPATIENT
Start: 2024-06-25 | End: 2024-06-25

## 2024-06-25 RX ORDER — SODIUM CHLOR/HYPOCHLOROUS ACID 0.033 %
SOLUTION, IRRIGATION IRRIGATION ONCE
OUTPATIENT
Start: 2024-06-25 | End: 2024-06-25

## 2024-06-25 RX ORDER — TRIAMCINOLONE ACETONIDE 1 MG/G
OINTMENT TOPICAL ONCE
OUTPATIENT
Start: 2024-06-25 | End: 2024-06-25

## 2024-06-25 RX ORDER — GINSENG 100 MG
CAPSULE ORAL ONCE
OUTPATIENT
Start: 2024-06-25 | End: 2024-06-25

## 2024-06-25 RX ORDER — BACITRACIN ZINC AND POLYMYXIN B SULFATE 500; 1000 [USP'U]/G; [USP'U]/G
OINTMENT TOPICAL ONCE
OUTPATIENT
Start: 2024-06-25 | End: 2024-06-25

## 2024-06-25 RX ORDER — BETAMETHASONE DIPROPIONATE 0.5 MG/G
CREAM TOPICAL ONCE
OUTPATIENT
Start: 2024-06-25 | End: 2024-06-25

## 2024-06-25 RX ORDER — LIDOCAINE 50 MG/G
OINTMENT TOPICAL ONCE
OUTPATIENT
Start: 2024-06-25 | End: 2024-06-25

## 2024-06-25 RX ADMIN — LIDOCAINE: 50 OINTMENT TOPICAL at 08:30

## 2024-06-25 NOTE — PROGRESS NOTES
Procedure Note  Indications: Based on my examination of this patient's wound(s)/ulcer(s) today, debridement is required to promote healing and evaluate the wound base.  Significant improvement this week.    Return Appointment:   2 weeks with Abiel [place] DO     Left Knee:  Cleanse wound with Normal Saline.  Vashe moistened gauze to wound bed and periwound for 1 minute    Hydrofera Ready: Cut to greater than wound size, place in wound bed, shiny side out.   Secure with adhesive bandage or cloth tape.  Change 3x weekly or as needed.  Follow dressing with Tubigrip to bilateral lower legs.     Compression: Tubigrip from base of toes to mid thigh using E, F, and G to graduate the compression appropriately.  Elevate lower legs above level of heart when not ambulating. Activity is beneficial.  Protein 100 g daily throughout the day.      Approved for Epifix if needed in the future.      Debridement: Excisional Debridement    Using: curette the wound(s)/ulcer(s) was/were debrided down through and including the removal of epidermis, dermis, and subcutaneous tissue.  Performed by: Abiel [place] DO  Consent obtained: Yes  Time out taken: Yes  Pain Control:         Devitalized Tissue Debrided: fibrin, biofilm, slough, and necrotic/eschar    Pre Debridement Measurements:  Are located in the Wound/Ulcer Documentation Flow Sheet    Diabetic/Pressure/Non Pressure Ulcers only:  Ulcer: Non-Pressure ulcer, fat layer exposed     Wound/Ulcer #: 1  Post Debridement Measurements:  Wound/Ulcer Descriptions are Pre Debridement except measurements:  Wound 04/03/24 Knee Left;Anterior #1 (Active)   Wound Image    06/25/24 0813   Wound Etiology Diabetic Lee 2 06/25/24 0813   Dressing Status Intact 06/25/24 0813   Wound Cleansed Cleansed with saline 06/25/24 0813   Dressing/Treatment Collagen;Hydrofera blue 06/25/24 0813   Offloading for Diabetic Foot Ulcers Offloading ordered 06/25/24 0813   Wound Length (cm) 1 cm 06/25/24 0813

## 2024-06-25 NOTE — FLOWSHEET NOTE
06/25/24 0813   Wound 04/03/24 Knee Left;Anterior #1   Date First Assessed/Time First Assessed: 04/03/24 1129   Present on Original Admission: Yes  Wound Approximate Age at First Assessment (Weeks): 3.5 weeks  Primary Wound Type: (c) Diabetic Ulcer  Location: Knee  Wound Location Orientation: Left;Anterio...   Wound Image     Wound Etiology Diabetic Lee 2   Dressing Status Intact   Wound Cleansed Cleansed with saline   Dressing/Treatment Collagen;Hydrofera blue   Offloading for Diabetic Foot Ulcers Offloading ordered   Wound Length (cm) 1 cm   Wound Width (cm) 0.8 cm   Wound Depth (cm) 0.1 cm   Wound Surface Area (cm^2) 0.8 cm^2   Change in Wound Size % (l*w) 52.38   Wound Volume (cm^3) 0.08 cm^3   Wound Healing % 88   Post-Procedure Length (cm) 1 cm   Post-Procedure Width (cm) 0.9 cm   Post-Procedure Depth (cm) 0.1 cm   Post-Procedure Surface Area (cm^2) 0.9 cm^2   Post-Procedure Volume (cm^3) 0.09 cm^3   Wound Assessment Isabela/red;Slough   Drainage Amount Moderate (25-50%)   Drainage Description Serosanguinous   Odor None   Maria M-wound Assessment Intact   Wound Thickness Description not for Pressure Injury Full thickness   Pain Assessment   Pain Assessment None - Denies Pain

## 2024-06-25 NOTE — WOUND CARE
Discharge Instructions for  [facility]  [address] [place] [address]  [address] [place] [address]  Phone [phone]   Fax [phone]      NAME:  Gilbert Yo  YOB: 1960  MEDICAL RECORD NUMBER:  [id number]  DATE:  6/25/2024    Return Appointment:   2 weeks with Abiel [place] DO    Left Knee:  Cleanse wound with Normal Saline.  Vashe moistened gauze to wound bed and periwound for 1 minute    Hydrofera Ready: Cut to greater than wound size, place in wound bed, shiny side out.   Secure with adhesive bandage or cloth tape.  Change 3x weekly or as needed.  Follow dressing with Tubigrip to bilateral lower legs.    Compression: Tubigrip from base of toes to mid thigh using E, F, and G to graduate the compression appropriately.  Elevate lower legs above level of heart when not ambulating. Activity is beneficial.  Protein 100 g daily throughout the day.     Approved for Epifix if needed in the future.     Should you experience increased redness, swelling, pain, foul odor, size of wound(s), or have a temperature over 101 degrees please contact the Wound Healing Center at [phone] or if after hours contact your primary care physician or go to the hospital emergency department.    PLEASE NOTE: IF YOU ARE UNABLE TO OBTAIN WOUND SUPPLIES, CONTINUE TO USE THE SUPPLIES YOU HAVE AVAILABLE UNTIL YOU ARE ABLE TO REACH US. IT IS MOST IMPORTANT TO KEEP THE WOUND COVERED AT ALL TIMES.    Electronically signed Ce Gates RN, [facility] on 6/25/2024 at 8:21 AM

## 2024-06-26 ENCOUNTER — HOSPITAL ENCOUNTER (OUTPATIENT)
Dept: PHYSICAL THERAPY | Age: 64
Setting detail: RECURRING SERIES
Discharge: HOME | End: 2024-06-29
Attending: ORTHOPAEDIC SURGERY
Payer: COMMERCIAL

## 2024-06-26 PROCEDURE — 97110 THERAPEUTIC EXERCISES: CPT

## 2024-06-26 ASSESSMENT — PAIN SCALES - GENERAL: PAINLEVEL_OUTOF10: 0

## 2024-06-26 NOTE — PROGRESS NOTES
Gilbert oY  : 1960  Primary: [employer] [place] [employer] [place] [employer])  Secondary:  O [facility]  [address]  [address]  [facility] [address]  Phone: [phone]  Fax: [phone] Plan Frequency: 2-3x/week x 90 days  Plan of Care/Certification Expiration Date: 24        Plan of Care/Certification Expiration Date:  Plan of Care/Certification Expiration Date: 24    Frequency/Duration: Plan Frequency: 2-3x/week x 90 days      Time In/Out:   Time In: 1115  Time Out: 1200      PT Visit Info:    Progress Note Due Date: 24  Total # of Visits to Date: 14  No Show: 1  Canceled Appointment: 1      Visit Count:  16    OUTPATIENT PHYSICAL THERAPY:   Treatment Note 2024       Episode  (S/P L TKA 24; Revision 3/8/24)               Treatment Diagnosis:    Muscle weakness (generalized)  Difficulty in walking, not elsewhere classified  Swelling of knee  Chronic pain of left knee  Medical/Referring Diagnosis:    Status post left knee replacement    Referring Physician:  Hal Dominguez Jr., MD MD Orders:  PT Eval and Treat   Return MD Appt:  24   Date of Onset:  Onset Date: 24     Allergies:   Patient has no known allergies.  Restrictions/Precautions:   None  Saw wound care today and doesn't go back for 6 months now.        Interventions Planned (Treatment may consist of any combination of the following):     See Assessment Note    Subjective Comments:    Pt was told wound is healing well. No L knee pain. Some popping at R knee.  Initial Pain Level::     0/10  Post Session Pain Level:       0/10  Medications Last Reviewed:  2024  Updated Objective Findings:  None today  Treatment   MODALITIES: ( 0 minutes )       *  Vasopneumatic Therapy utilizing the Game Ready system in order to provide analgesia and reduce inflammation and edema.     Body Part: Left   Edema Measurements: Involved - Pre: 53.5 cm , Post: 50.0 cm       Effects of Edema on Function: Decreased ROM;

## 2024-06-28 ENCOUNTER — HOSPITAL ENCOUNTER (OUTPATIENT)
Dept: PHYSICAL THERAPY | Age: 64
Setting detail: RECURRING SERIES
End: 2024-06-28
Attending: ORTHOPAEDIC SURGERY
Payer: COMMERCIAL

## 2024-07-03 ENCOUNTER — HOSPITAL ENCOUNTER (OUTPATIENT)
Dept: PHYSICAL THERAPY | Age: 64
Setting detail: RECURRING SERIES
End: 2024-07-03
Attending: ORTHOPAEDIC SURGERY
Payer: COMMERCIAL

## 2024-07-09 ENCOUNTER — HOSPITAL ENCOUNTER (OUTPATIENT)
Dept: PHYSICAL THERAPY | Age: 64
Setting detail: RECURRING SERIES
Discharge: HOME | End: 2024-07-12
Attending: ORTHOPAEDIC SURGERY
Payer: COMMERCIAL

## 2024-07-09 ENCOUNTER — OFFICE VISIT (OUTPATIENT)
Dept: ORTHOPEDIC SURGERY | Age: 64
End: 2024-07-09
Payer: COMMERCIAL

## 2024-07-09 ENCOUNTER — HOSPITAL ENCOUNTER (OUTPATIENT)
Dept: WOUND CARE | Age: 64
Discharge: HOME | End: 2024-07-09
Payer: COMMERCIAL

## 2024-07-09 VITALS
OXYGEN SATURATION: 92 % | SYSTOLIC BLOOD PRESSURE: 119 MMHG | HEIGHT: 74 IN | RESPIRATION RATE: 12 BRPM | BODY MASS INDEX: 40.43 KG/M2 | WEIGHT: 315 LBS | TEMPERATURE: 98.2 F | DIASTOLIC BLOOD PRESSURE: 56 MMHG | HEART RATE: 73 BPM

## 2024-07-09 DIAGNOSIS — Z96.652 STATUS POST LEFT KNEE REPLACEMENT: Primary | ICD-10-CM

## 2024-07-09 DIAGNOSIS — S81.002A OPEN WOUND OF LEFT KNEE, INITIAL ENCOUNTER: Primary | ICD-10-CM

## 2024-07-09 PROCEDURE — 99213 OFFICE O/P EST LOW 20 MIN: CPT | Performed by: ORTHOPAEDIC SURGERY

## 2024-07-09 PROCEDURE — 11042 DBRDMT SUBQ TIS 1ST 20SQCM/<: CPT

## 2024-07-09 PROCEDURE — 97110 THERAPEUTIC EXERCISES: CPT

## 2024-07-09 RX ORDER — LIDOCAINE HYDROCHLORIDE 40 MG/ML
SOLUTION TOPICAL ONCE
OUTPATIENT
Start: 2024-07-09 | End: 2024-07-09

## 2024-07-09 RX ORDER — SODIUM CHLOR/HYPOCHLOROUS ACID 0.033 %
SOLUTION, IRRIGATION IRRIGATION ONCE
OUTPATIENT
Start: 2024-07-09 | End: 2024-07-09

## 2024-07-09 RX ORDER — TRIAMCINOLONE ACETONIDE 1 MG/G
OINTMENT TOPICAL ONCE
OUTPATIENT
Start: 2024-07-09 | End: 2024-07-09

## 2024-07-09 RX ORDER — LIDOCAINE 40 MG/G
CREAM TOPICAL ONCE
OUTPATIENT
Start: 2024-07-09 | End: 2024-07-09

## 2024-07-09 RX ORDER — LIDOCAINE 50 MG/G
OINTMENT TOPICAL ONCE
OUTPATIENT
Start: 2024-07-09 | End: 2024-07-09

## 2024-07-09 RX ORDER — LIDOCAINE HYDROCHLORIDE 20 MG/ML
JELLY TOPICAL ONCE
Status: COMPLETED | OUTPATIENT
Start: 2024-07-09 | End: 2024-07-09

## 2024-07-09 RX ORDER — LIDOCAINE HYDROCHLORIDE 20 MG/ML
JELLY TOPICAL ONCE
OUTPATIENT
Start: 2024-07-09 | End: 2024-07-09

## 2024-07-09 RX ORDER — BETAMETHASONE DIPROPIONATE 0.5 MG/G
CREAM TOPICAL ONCE
OUTPATIENT
Start: 2024-07-09 | End: 2024-07-09

## 2024-07-09 RX ORDER — BACITRACIN ZINC AND POLYMYXIN B SULFATE 500; 1000 [USP'U]/G; [USP'U]/G
OINTMENT TOPICAL ONCE
OUTPATIENT
Start: 2024-07-09 | End: 2024-07-09

## 2024-07-09 RX ORDER — GINSENG 100 MG
CAPSULE ORAL ONCE
OUTPATIENT
Start: 2024-07-09 | End: 2024-07-09

## 2024-07-09 RX ORDER — CLOBETASOL PROPIONATE 0.5 MG/G
OINTMENT TOPICAL ONCE
OUTPATIENT
Start: 2024-07-09 | End: 2024-07-09

## 2024-07-09 RX ORDER — GENTAMICIN SULFATE 1 MG/G
OINTMENT TOPICAL ONCE
OUTPATIENT
Start: 2024-07-09 | End: 2024-07-09

## 2024-07-09 RX ORDER — IBUPROFEN 200 MG
TABLET ORAL ONCE
OUTPATIENT
Start: 2024-07-09 | End: 2024-07-09

## 2024-07-09 RX ORDER — SODIUM CHLOR/HYPOCHLOROUS ACID 0.033 %
SOLUTION, IRRIGATION IRRIGATION ONCE
Status: COMPLETED | OUTPATIENT
Start: 2024-07-09 | End: 2024-07-09

## 2024-07-09 RX ADMIN — LIDOCAINE HYDROCHLORIDE: 20 JELLY TOPICAL at 10:17

## 2024-07-09 RX ADMIN — Medication: at 10:18

## 2024-07-09 ASSESSMENT — PAIN SCALES - GENERAL: PAINLEVEL_OUTOF10: 0

## 2024-07-09 NOTE — WOUND CARE
Discharge Instructions for  [facility]  [address] [place] [address]  [address]  [place] [address]  Phone [phone]   Fax [phone]      NAME:  Gilbert Yo  YOB: 1960  MEDICAL RECORD NUMBER:  [id number]  DATE:  7/9/2024    Return Appointment:   2 weeks with Abiel [place] DO      Instructions:   Left Knee:  Cleanse wound with Normal Saline.  Vashe moistened gauze to wound bed and periwound for 1 minute   Hydrofera Ready: Cut to greater than wound size, place in wound bed, shiny side out.   Secure with adhesive bandage or cloth tape.  Dressing change 3x weekly.  Follow dressing with Tubigrip to bilateral lower legs.     Compression: Tubigrip from base of toes to mid thigh using E, F, and G to graduate the compression appropriately.  Elevate lower legs above level of heart when not ambulating. Activity is beneficial.  Protein 100 g daily throughout the day.     Silver Nitrate application today.    Per Infectious Disease on 05/21/2024: continue Duricef for 3 months.   Follow up with ID 09/23/2024    Should you experience increased redness, swelling, pain, foul odor, size of wound(s), or have a temperature over 101 degrees please contact the Wound Healing Center at [phone] or if after hours contact your primary care physician or go to the hospital emergency department.    PLEASE NOTE: IF YOU ARE UNABLE TO OBTAIN WOUND SUPPLIES, CONTINUE TO USE THE SUPPLIES YOU HAVE AVAILABLE UNTIL YOU ARE ABLE TO REACH US. IT IS MOST IMPORTANT TO KEEP THE WOUND COVERED AT ALL TIMES.    Electronically signed Cielo Rodas RN on 7/9/2024 at 6:37 PM

## 2024-07-09 NOTE — DISCHARGE INSTRUCTIONS
Left Knee:  Cleanse wound with Normal Saline.  Vashe moistened gauze to wound bed and periwound for 1 minute   Hydrofera Ready: Cut to greater than wound size, place in wound bed, shiny side out.   Secure with adhesive bandage or cloth tape.  Dressing change 3x weekly.  Follow dressing with Tubigrip to bilateral lower legs.     Compression: Tubigrip from base of toes to mid thigh using E, F, and G to graduate the compression appropriately.  Elevate lower legs above level of heart when not ambulating. Activity is beneficial.  Protein 100 g daily throughout the day.      Silver Nitrate application today.     Per Infectious Disease on 05/21/2024: continue Duricef for 3 months.   Follow up with ID 09/23/2024

## 2024-07-09 NOTE — FLOWSHEET NOTE
Pre and Post Debridement Notes:    Silver Nitrate application.     07/09/24 1015   Right Leg Edema Point of Measurement   Leg circumference 51 cm   Ankle circumference 29.5 cm   Foot circumference 27 cm   Compression Therapy Tubular elastic support bandage   Left Leg Edema Point of Measurement   Leg circumference 54 cm   Ankle circumference 29.5 cm   Foot circumference 26.5 cm   Compression Therapy Tubular elastic support bandage   Wound 04/03/24 Knee Left;Anterior #1   Date First Assessed/Time First Assessed: 04/03/24 1129   Present on Original Admission: Yes  Wound Approximate Age at First Assessment (Weeks): 3.5 weeks  Primary Wound Type: (c) Diabetic Ulcer  Location: Knee  Wound Location Orientation: Left;Anterio...   Wound Image     Wound Etiology Diabetic Lee 2  (Non Healing Surgical component)   Dressing Status Old drainage noted   Wound Cleansed Cleansed with saline;Vashe   Dressing/Treatment Hydrofera blue   Offloading for Diabetic Foot Ulcers Offloading not required   Wound Length (cm) 0.5 cm   Wound Width (cm) 0.4 cm   Wound Depth (cm) 0.1 cm   Wound Surface Area (cm^2) 0.2 cm^2   Change in Wound Size % (l*w) 88.1   Wound Volume (cm^3) 0.02 cm^3   Wound Healing % 97   Post-Procedure Length (cm) 0.6 cm   Post-Procedure Width (cm) 0.4 cm   Post-Procedure Depth (cm) 0.1 cm   Post-Procedure Surface Area (cm^2) 0.24 cm^2   Post-Procedure Volume (cm^3) 0.024 cm^3   Wound Assessment Slough;Pink/red;Granulation tissue   Drainage Amount Scant (moist but unmeasurable)   Drainage Description Serosanguinous   Odor None   Maria M-wound Assessment Edematous   Margins Attached edges   Wound Thickness Description not for Pressure Injury Full thickness   Pain Assessment   Pain Assessment None - Denies Pain     Patient takes ASA.

## 2024-07-09 NOTE — PROGRESS NOTES
Name: Gilbert Yo  YOB: 1960  Gender: male  MRN: [id number]    CC: Wound Check  Periprosthetic joint infection left total knee    HPI: Gilbert Yo is a 64 y.o. male who presents with No chief complaint on file.  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  He has been seen by wound therapy for the central portion of the incision which is gradually healing.  He has completed IV antibiotics and is currently on oral antibiotics. His most recent laboratory work revealed that the sed rate and C-reactive protein have returned to normal.  Patient is experiencing very little discomfort and pain in the left knee.    History was obtained by patient    ROS/Meds/PSH/PMH/FH/SH: I personally reviewed the patients standard intake form.      Current Outpatient Medications:     furosemide (LASIX) 20 MG tablet, Take 1 tablet by mouth 2 times daily for 10 days, Disp: 20 tablet, Rfl: 0    cefadroxil (DURICEF) 500 MG capsule, Take 2 capsules by mouth 2 times daily, Disp: , Rfl:     zolpidem (AMBIEN) 10 MG tablet, TAKE ONE TABLET BY MOUTH AT BEDTIME AS NEEDED FOR INSOMNIA, Disp: , Rfl:     busPIRone (BUSPAR) 10 MG tablet, Take 10 mg by mouth nightly. Take one to two tablets at night for sleep, Disp: , Rfl:     QUEtiapine (SEROQUEL) 50 MG tablet, Take 50 mg by mouth nightly. take one at night, Disp: , Rfl:     irbesartan (AVAPRO) 300 MG tablet, Take 1 tablet by mouth daily, Disp: , Rfl:     buPROPion (WELLBUTRIN SR) 150 MG extended release tablet, Take 150 mg by mouth daily., Disp: , Rfl:     celecoxib (CELEBREX) 200 MG capsule, Take 200 mg by mouth 2 times daily., Disp: , Rfl:     vitamin D (VITAMIN D3) 125 MCG (5000 UT) CAPS capsule, Take 1 capsule by mouth daily., Disp: , Rfl:     Sodium Chloride Flush (SALINE FLUSH IV), Infuse 10 mLs intravenously daily., Disp: , Rfl:     sodium chloride 0.9 % SOLN 100 mL with ceFAZolin 1 g SOLR 2,000 mg, Infuse 6 g

## 2024-07-09 NOTE — PROGRESS NOTES
Gilbert Yo  : 1960  Primary: [employer] [place] [employer] [place] [employer]  Secondary:  O [facility]  [address]  [address]  [facility] [address]  Phone: [phone]  Fax: [phone] Plan Frequency: 2-3x/week x 90 days  Plan of Care/Certification Expiration Date: 24        Plan of Care/Certification Expiration Date:  Plan of Care/Certification Expiration Date: 24    Frequency/Duration: Plan Frequency: 2-3x/week x 90 days      Time In/Out:   Time In: 1133  Time Out: 1202      PT Visit Info:    Progress Note Due Date: 24  Total # of Visits to Date: 15  No Show: 1  Canceled Appointment: 3      Visit Count:  17    OUTPATIENT PHYSICAL THERAPY:   Treatment Note 2024       Episode  (S/P L TKA 24; Revision 3/8/24)               Treatment Diagnosis:    Muscle weakness (generalized)  Difficulty in walking, not elsewhere classified  Swelling of knee  Chronic pain of left knee  Medical/Referring Diagnosis:    Status post left knee replacement    Referring Physician:  Hal Dominguez Jr., MD MD Orders:  PT Eval and Treat   Return MD Appt:  24   Date of Onset:  Onset Date: 24     Allergies:   Patient has no known allergies.  Restrictions/Precautions:   None  Saw wound care today and doesn't go back for 6 months now.        Interventions Planned (Treatment may consist of any combination of the following):     See Assessment Note    Subjective Comments:    Pts L knee is feeling good. Had follow up appts this morning and was told his knee is doing well.  Initial Pain Level::     0/10  Post Session Pain Level:       0/10  Medications Last Reviewed:  2024  Updated Objective Findings:  None today  Treatment   MODALITIES: ( 0 minutes )       *  Vasopneumatic Therapy utilizing the Game Ready system in order to provide analgesia and reduce inflammation and edema.     Body Part: Left   Edema Measurements: Involved - Pre: 53.5 cm , Post: 50.0 cm       Effects of Edema on Function:

## 2024-07-11 ENCOUNTER — HOSPITAL ENCOUNTER (OUTPATIENT)
Dept: PHYSICAL THERAPY | Age: 64
Setting detail: RECURRING SERIES
Discharge: HOME | End: 2024-07-14
Attending: ORTHOPAEDIC SURGERY
Payer: COMMERCIAL

## 2024-07-11 PROCEDURE — 97110 THERAPEUTIC EXERCISES: CPT

## 2024-07-11 ASSESSMENT — PAIN SCALES - GENERAL: PAINLEVEL_OUTOF10: 0

## 2024-07-11 NOTE — PROGRESS NOTES
Gilbert Yo  : 1960  Primary: [employer] [place] [place] [employer])  Secondary:  [facility]  [address]  [address]  [facility] [address]  Phone: [phone]  Fax: [phone] Plan Frequency: 2-3x/week x 90 days  Plan of Care/Certification Expiration Date: 24        Plan of Care/Certification Expiration Date:  Plan of Care/Certification Expiration Date: 24    Frequency/Duration: Plan Frequency: 2-3x/week x 90 days      Time In/Out:   Time In: 1515  Time Out: 1600      PT Visit Info:    Progress Note Due Date: 24  Total # of Visits to Date: 18  No Show: 1  Canceled Appointment: 3      Visit Count:  18    OUTPATIENT PHYSICAL THERAPY:   Treatment Note 2024       Episode  (S/P L TKA 24; Revision 3/8/24)               Treatment Diagnosis:    Muscle weakness (generalized)  Difficulty in walking, not elsewhere classified  Swelling of knee  Chronic pain of left knee  Medical/Referring Diagnosis:    Status post left knee replacement    Referring Physician:  Hal Dominguez Jr., MD MD Orders:  PT Eval and Treat   Return MD Appt:  24   Date of Onset:  Onset Date: 24     Allergies:   Patient has no known allergies.  Restrictions/Precautions:   None  Saw wound care today and doesn't go back for 6 months now.        Interventions Planned (Treatment may consist of any combination of the following):     See Assessment Note    Subjective Comments:    Pt states no knee complaints today.  Initial Pain Level::     0/10  Post Session Pain Level:       0/10  Medications Last Reviewed:  2024  Updated Objective Findings:  None today  Treatment   MODALITIES: ( 0 minutes )       *  Vasopneumatic Therapy utilizing the Game Ready system in order to provide analgesia and reduce inflammation and edema.     Body Part: Left   Edema Measurements: Involved - Pre: 53.5 cm , Post: 50.0 cm       Effects of Edema on Function: Decreased ROM; increased stiffness; increased heaviness of L

## 2024-07-16 NOTE — PROGRESS NOTES
Gilbert Yo  : 1960  Primary: [employer] [employer] [place] [employer] [place] [employer])  [facility]  [facility]  [address]  [address]  [facility] [address]  Phone: [phone]  Fax: [phone] Plan Frequency: 2-3x/week x 90 days  Plan of Care/Certification Expiration Date: 24        Plan of Care/Certification Expiration Date:  Plan of Care/Certification Expiration Date: 24    Frequency/Duration: Plan Frequency: 2-3x/week x 90 days      Time In/Out:   Time In: 1119  Time Out: 1205      PT Visit Info:    Progress Note Due Date: 24  Total # of Visits to Date: 19  No Show: 2  Canceled Appointment: 3      Visit Count:  19    OUTPATIENT PHYSICAL THERAPY:   Treatment Note 2024       Episode  (S/P L TKA 24; Revision 3/8/24)               Treatment Diagnosis:    Muscle weakness (generalized)  Difficulty in walking, not elsewhere classified  Swelling of knee  Chronic pain of left knee  Medical/Referring Diagnosis:    Status post left knee replacement    Referring Physician:  Hal Dominguez Jr., MD MD Orders:  PT Eval and Treat   Return MD Appt:  24   Date of Onset:  Onset Date: 24     Allergies:   Patient has no known allergies.  Restrictions/Precautions:   None  Saw wound care today and doesn't go back for 6 months now.        Interventions Planned (Treatment may consist of any combination of the following):     See Assessment Note    Subjective Comments:   Patient reports he's doing well and L knee wound continues to heal.   Initial Pain Level:: Left Knee, Leg 0/10  Post Session Pain Level:  Left  Knee, Leg 0/10  Medications Last Reviewed:  2024  Updated Objective Findings:  None today  Treatment   MODALITIES: ( 0 minutes )       *  Vasopneumatic Therapy utilizing the Game Ready system in order to provide analgesia and reduce inflammation and edema.     Body Part: Left   Edema Measurements: Involved - Pre: 53.5 cm , Post: 50.0 cm       Effects of Edema on Function:

## 2024-07-18 ENCOUNTER — HOSPITAL ENCOUNTER (OUTPATIENT)
Dept: PHYSICAL THERAPY | Age: 64
Setting detail: RECURRING SERIES
Discharge: HOME | End: 2024-07-21
Attending: ORTHOPAEDIC SURGERY
Payer: COMMERCIAL

## 2024-07-18 PROCEDURE — 97110 THERAPEUTIC EXERCISES: CPT

## 2024-07-23 ENCOUNTER — HOSPITAL ENCOUNTER (OUTPATIENT)
Dept: WOUND CARE | Age: 64
Discharge: HOME | End: 2024-07-23
Payer: COMMERCIAL

## 2024-07-23 ENCOUNTER — HOSPITAL ENCOUNTER (OUTPATIENT)
Dept: PHYSICAL THERAPY | Age: 64
Setting detail: RECURRING SERIES
Discharge: HOME | End: 2024-07-26
Attending: ORTHOPAEDIC SURGERY
Payer: COMMERCIAL

## 2024-07-23 VITALS
TEMPERATURE: 97.8 F | WEIGHT: 315 LBS | RESPIRATION RATE: 16 BRPM | SYSTOLIC BLOOD PRESSURE: 133 MMHG | HEIGHT: 74 IN | HEART RATE: 81 BPM | BODY MASS INDEX: 40.43 KG/M2 | DIASTOLIC BLOOD PRESSURE: 64 MMHG

## 2024-07-23 DIAGNOSIS — S81.002A OPEN WOUND OF LEFT KNEE, INITIAL ENCOUNTER: Primary | ICD-10-CM

## 2024-07-23 PROCEDURE — 97110 THERAPEUTIC EXERCISES: CPT

## 2024-07-23 PROCEDURE — 11042 DBRDMT SUBQ TIS 1ST 20SQCM/<: CPT

## 2024-07-23 RX ORDER — LIDOCAINE HYDROCHLORIDE 40 MG/ML
SOLUTION TOPICAL ONCE
OUTPATIENT
Start: 2024-07-23 | End: 2024-07-23

## 2024-07-23 RX ORDER — SODIUM CHLOR/HYPOCHLOROUS ACID 0.033 %
SOLUTION, IRRIGATION IRRIGATION ONCE
Status: COMPLETED | OUTPATIENT
Start: 2024-07-23 | End: 2024-07-23

## 2024-07-23 RX ORDER — IBUPROFEN 200 MG
TABLET ORAL ONCE
OUTPATIENT
Start: 2024-07-23 | End: 2024-07-23

## 2024-07-23 RX ORDER — LIDOCAINE HYDROCHLORIDE 20 MG/ML
JELLY TOPICAL ONCE
OUTPATIENT
Start: 2024-07-23 | End: 2024-07-23

## 2024-07-23 RX ORDER — LIDOCAINE HYDROCHLORIDE 20 MG/ML
JELLY TOPICAL ONCE
Status: COMPLETED | OUTPATIENT
Start: 2024-07-23 | End: 2024-07-23

## 2024-07-23 RX ORDER — LIDOCAINE 40 MG/G
CREAM TOPICAL ONCE
OUTPATIENT
Start: 2024-07-23 | End: 2024-07-23

## 2024-07-23 RX ORDER — SODIUM CHLOR/HYPOCHLOROUS ACID 0.033 %
SOLUTION, IRRIGATION IRRIGATION ONCE
OUTPATIENT
Start: 2024-07-23 | End: 2024-07-23

## 2024-07-23 RX ORDER — BACITRACIN ZINC AND POLYMYXIN B SULFATE 500; 1000 [USP'U]/G; [USP'U]/G
OINTMENT TOPICAL ONCE
OUTPATIENT
Start: 2024-07-23 | End: 2024-07-23

## 2024-07-23 RX ORDER — LIDOCAINE 50 MG/G
OINTMENT TOPICAL ONCE
OUTPATIENT
Start: 2024-07-23 | End: 2024-07-23

## 2024-07-23 RX ORDER — GENTAMICIN SULFATE 1 MG/G
OINTMENT TOPICAL ONCE
OUTPATIENT
Start: 2024-07-23 | End: 2024-07-23

## 2024-07-23 RX ORDER — TRIAMCINOLONE ACETONIDE 1 MG/G
OINTMENT TOPICAL ONCE
OUTPATIENT
Start: 2024-07-23 | End: 2024-07-23

## 2024-07-23 RX ORDER — BETAMETHASONE DIPROPIONATE 0.5 MG/G
CREAM TOPICAL ONCE
OUTPATIENT
Start: 2024-07-23 | End: 2024-07-23

## 2024-07-23 RX ORDER — GINSENG 100 MG
CAPSULE ORAL ONCE
OUTPATIENT
Start: 2024-07-23 | End: 2024-07-23

## 2024-07-23 RX ORDER — CLOBETASOL PROPIONATE 0.5 MG/G
OINTMENT TOPICAL ONCE
OUTPATIENT
Start: 2024-07-23 | End: 2024-07-23

## 2024-07-23 RX ADMIN — LIDOCAINE HYDROCHLORIDE: 20 JELLY TOPICAL at 11:03

## 2024-07-23 RX ADMIN — Medication: at 11:03

## 2024-07-23 NOTE — DISCHARGE INSTRUCTIONS
Return Appointment:   2 weeks with Abiel [place] DO        Instructions:   Left Knee:  Cleanse wound with Normal Saline.  Vashe moistened gauze to wound bed and periwound for 1 minute   Xeeroform to wound  Secure with adhesive bandage Change Dressing daily   Follow dressing with Tubigrip to bilateral lower legs.     Compression: Tubigrip from base of toes to mid thigh using E, F, and G to graduate the compression appropriately.  Elevate lower legs above level of heart when not ambulating. Activity is beneficial.  Protein 100 g daily throughout the day.       continue Duricef as directed by Infectious Disease  Follow up with ID 09/23/2024     Should you experience increased redness, swelling, pain, foul odor, size of wound(s), or have a temperature over 101 degrees please contact the Wound Healing Center at [phone] or if after hours contact your primary care physician or go to the hospital emergency department.     PLEASE NOTE: IF YOU ARE UNABLE TO OBTAIN WOUND SUPPLIES, CONTINUE TO USE THE SUPPLIES YOU HAVE AVAILABLE UNTIL YOU ARE ABLE TO REACH US. IT IS MOST IMPORTANT TO KEEP THE WOUND COVERED AT ALL TIMES

## 2024-07-23 NOTE — FLOWSHEET NOTE
07/23/24 1017   Right Leg Edema Point of Measurement   Leg circumference 50.5 cm   Ankle circumference 29 cm   Foot circumference 27.5 cm   Compression Therapy Tubular elastic support bandage   Left Leg Edema Point of Measurement   Leg circumference 53.5 cm   Ankle circumference 30.5 cm   Foot circumference 27 cm   Compression Therapy Tubular elastic support bandage  (left knee 59.5cm)   Wound 04/03/24 Knee Left;Anterior #1   Date First Assessed/Time First Assessed: 04/03/24 1129   Present on Original Admission: Yes  Wound Approximate Age at First Assessment (Weeks): 3.5 weeks  Primary Wound Type: (c) Diabetic Ulcer  Location: Knee  Wound Location Orientation: Left;Anterio...   Wound Image     Wound Etiology Diabetic Lee 2   Dressing Status Dry   Wound Cleansed Cleansed with saline   Dressing/Treatment Hydrofera blue   Wound Length (cm) 0.6 cm   Wound Width (cm) 0.4 cm   Wound Depth (cm) 0.1 cm   Wound Surface Area (cm^2) 0.24 cm^2   Change in Wound Size % (l*w) 85.71   Wound Volume (cm^3) 0.024 cm^3   Wound Healing % 96   Post-Procedure Length (cm) 0.4 cm   Post-Procedure Width (cm) 0.4 cm   Post-Procedure Depth (cm) 0.2 cm   Post-Procedure Surface Area (cm^2) 0.16 cm^2   Post-Procedure Volume (cm^3) 0.032 cm^3   Wound Assessment Dry   Maria M-wound Assessment Edematous   Margins Attached edges   Wound Thickness Description not for Pressure Injury Full thickness

## 2024-07-23 NOTE — PROGRESS NOTES
Gilbert Yo  : 1960  Primary: [employer] [place] [employer] [place] [employer])  [facility]  [facility]  [address]  [address]  [facility] [address]  Phone: [phone]  Fax: [phone] Plan Frequency: 2-3x/week x 90 days  Plan of Care/Certification Expiration Date: 24        Plan of Care/Certification Expiration Date:  Plan of Care/Certification Expiration Date: 24    Frequency/Duration: Plan Frequency: 2-3x/week x 90 days      Time In/Out:   Time In: 1350  Time Out: 1434      PT Visit Info:    Progress Note Due Date: 24  Total # of Visits to Date: 20  No Show: 2  Canceled Appointment: 3      Visit Count:  20    OUTPATIENT PHYSICAL THERAPY:   Treatment Note 2024       Episode  (S/P L TKA 24; Revision 3/8/24)               Treatment Diagnosis:    Muscle weakness (generalized)  Difficulty in walking, not elsewhere classified  Swelling of knee  Chronic pain of left knee  Medical/Referring Diagnosis:    Status post left knee replacement    Referring Physician:  Hal Dominguez Jr., MD MD Orders:  PT Eval and Treat   Return MD Appt:  24   Date of Onset:  Onset Date: 24     Allergies:   Patient has no known allergies.  Restrictions/Precautions:   None  Saw wound care today and doesn't go back for 6 months now.        Interventions Planned (Treatment may consist of any combination of the following):     See Assessment Note    Subjective Comments:   Patient said he feels more tired/fatigued today. Would is healing well.  Initial Pain Level::     0/10  Post Session Pain Level:       0/10  Medications Last Reviewed:  2024  Updated Objective Findings:  None today  Treatment   MODALITIES: ( 0 minutes )       *  Vasopneumatic Therapy utilizing the Game Ready system in order to provide analgesia and reduce inflammation and edema.     Body Part: Left   Edema Measurements: Involved - Pre: 53.5 cm , Post: 50.0 cm       Effects of Edema on Function: Decreased ROM; increased

## 2024-07-23 NOTE — WOUND CARE
Discharge Instructions for  [facility]  [address]  [address]  [place] [address]  Phone [phone]   Fax [phone]      NAME:  Gilbert Yo  YOB: 1960  MEDICAL RECORD NUMBER:  [id number]  DATE:  7/23/2024    Return Appointment:   2 weeks with Abiel Nicholas DO      Instructions:   Left Knee:  Cleanse wound with Normal Saline.  Vashe moistened gauze to wound bed and periwound for 1 minute   Xeeroform to wound  Secure with adhesive bandage Change Dressing daily   Follow dressing with Tubigrip to bilateral lower legs.     Compression: Tubigrip from base of toes to mid thigh using E, F, and G to graduate the compression appropriately.  Elevate lower legs above level of heart when not ambulating. Activity is beneficial.  Protein 100 g daily throughout the day.      continue Duricef as directed by Infectious Disease  Follow up with ID 09/23/2024    Should you experience increased redness, swelling, pain, foul odor, size of wound(s), or have a temperature over 101 degrees please contact the [facility] at [phone] or if after hours contact your primary care physician or go to the hospital emergency department.    PLEASE NOTE: IF YOU ARE UNABLE TO OBTAIN WOUND SUPPLIES, CONTINUE TO USE THE SUPPLIES YOU HAVE AVAILABLE UNTIL YOU ARE ABLE TO REACH US. IT IS MOST IMPORTANT TO KEEP THE WOUND COVERED AT ALL TIMES.    Electronically signed YANCY CAO RN on 7/23/2024 at 10:41 AM

## 2024-07-25 ENCOUNTER — HOSPITAL ENCOUNTER (OUTPATIENT)
Dept: PHYSICAL THERAPY | Age: 64
Setting detail: RECURRING SERIES
End: 2024-07-25
Attending: ORTHOPAEDIC SURGERY
Payer: COMMERCIAL

## 2024-07-25 NOTE — THERAPY DISCHARGE
Gilbert Yo  : 1960  Primary: [employer] [place]  Secondary:  [facility]  [address]  [address]  [facility] [address]  Phone: [phone]  Fax: [phone] Plan Frequency: 2-3x/week x 90 days  Plan of Care/Certification Expiration Date: 24        Plan of Care/Certification Expiration Date:  Plan of Care/Certification Expiration Date: 24    Frequency/Duration: Plan Frequency: 2-3x/week x 90 days      Time In/Out:          PT Visit Info:    Progress Note Due Date: 24  Total # of Visits to Date: 20  No Show: 2  Canceled Appointment: 4      Visit Count:  Visit count could not be calculated. Make sure you are using a visit which is associated with an episode.                OUTPATIENT PHYSICAL THERAPY:             Discharge Summary 2024               Episode (S/P L TKA 24; Revision 3/8/24)         Treatment Diagnosis:     No data found  Medical/Referring Diagnosis:    Status post left knee replacement    Referring Physician:  Hal Dominguez Jr., MD MD Orders:  PT Eval and Treat   Return MD Appt:  24  Date of Onset:  Onset Date: 24     Allergies:  Patient has no known allergies.  Restrictions/Precautions:    None  Patient has been under the care of wound care due to slow healing incision and he is a TKA revision from 24      Medications Last Reviewed:  2024        OBJECTIVE   Objective:  Gait: normalized  Incision: 1 small wound medial to incision that is dime sized. He is still being seen by wound care biweekly     LE AROM/Strength DATE  24 DATE  24    Hip Flexion R:   L:  R:   L:    Hip Abduction  R:   L:  R:   L:    Hip Extension  R:   L:  R:   L:    Knee Flexion  R: WFL  L: 106deg; 3 R:   L: active 115deg, passive 120deg   Knee Extension  R: WFL  L: 5deg; 3/5 R:   L: 0 deg   Ankle Dorsiflexion  R:   L: R:   L:   Ankle Plantarflexion  R:  L: R:  L:   Flexibility: HS/Quads/GS R:  L: mod tightness in L HS/GS

## 2024-08-09 ENCOUNTER — HOSPITAL ENCOUNTER (OUTPATIENT)
Dept: WOUND CARE | Age: 64
Discharge: HOME | End: 2024-08-09
Payer: COMMERCIAL

## 2024-08-09 VITALS
RESPIRATION RATE: 16 BRPM | HEART RATE: 63 BPM | HEIGHT: 74 IN | TEMPERATURE: 98.2 F | WEIGHT: 315 LBS | OXYGEN SATURATION: 100 % | BODY MASS INDEX: 40.43 KG/M2 | DIASTOLIC BLOOD PRESSURE: 58 MMHG | SYSTOLIC BLOOD PRESSURE: 136 MMHG

## 2024-08-09 DIAGNOSIS — S81.002A OPEN WOUND OF LEFT KNEE, INITIAL ENCOUNTER: Primary | ICD-10-CM

## 2024-08-09 PROCEDURE — 99212 OFFICE O/P EST SF 10 MIN: CPT

## 2024-08-09 PROCEDURE — 11042 DBRDMT SUBQ TIS 1ST 20SQCM/<: CPT

## 2024-08-09 RX ORDER — LIDOCAINE 50 MG/G
OINTMENT TOPICAL ONCE
Status: CANCELLED | OUTPATIENT
Start: 2024-08-09 | End: 2024-08-09

## 2024-08-09 RX ORDER — BUPROPION HYDROCHLORIDE 150 MG/1
TABLET ORAL
COMMUNITY
Start: 2024-07-22

## 2024-08-09 RX ORDER — LIDOCAINE HYDROCHLORIDE 20 MG/ML
JELLY TOPICAL ONCE
Status: CANCELLED | OUTPATIENT
Start: 2024-08-09 | End: 2024-08-09

## 2024-08-09 RX ORDER — IBUPROFEN 200 MG
TABLET ORAL ONCE
Status: CANCELLED | OUTPATIENT
Start: 2024-08-09 | End: 2024-08-09

## 2024-08-09 RX ORDER — GENTAMICIN SULFATE 1 MG/G
OINTMENT TOPICAL ONCE
Status: CANCELLED | OUTPATIENT
Start: 2024-08-09 | End: 2024-08-09

## 2024-08-09 RX ORDER — CLOBETASOL PROPIONATE 0.5 MG/G
OINTMENT TOPICAL ONCE
Status: CANCELLED | OUTPATIENT
Start: 2024-08-09 | End: 2024-08-09

## 2024-08-09 RX ORDER — BETAMETHASONE DIPROPIONATE 0.5 MG/G
CREAM TOPICAL ONCE
Status: CANCELLED | OUTPATIENT
Start: 2024-08-09 | End: 2024-08-09

## 2024-08-09 RX ORDER — BACITRACIN ZINC AND POLYMYXIN B SULFATE 500; 1000 [USP'U]/G; [USP'U]/G
OINTMENT TOPICAL ONCE
Status: CANCELLED | OUTPATIENT
Start: 2024-08-09 | End: 2024-08-09

## 2024-08-09 RX ORDER — LIDOCAINE 40 MG/G
CREAM TOPICAL ONCE
Status: CANCELLED | OUTPATIENT
Start: 2024-08-09 | End: 2024-08-09

## 2024-08-09 RX ORDER — GINSENG 100 MG
CAPSULE ORAL ONCE
Status: CANCELLED | OUTPATIENT
Start: 2024-08-09 | End: 2024-08-09

## 2024-08-09 RX ORDER — LIDOCAINE HYDROCHLORIDE 40 MG/ML
SOLUTION TOPICAL ONCE
Status: CANCELLED | OUTPATIENT
Start: 2024-08-09 | End: 2024-08-09

## 2024-08-09 RX ORDER — SODIUM CHLOR/HYPOCHLOROUS ACID 0.033 %
SOLUTION, IRRIGATION IRRIGATION ONCE
Status: CANCELLED | OUTPATIENT
Start: 2024-08-09 | End: 2024-08-09

## 2024-08-09 RX ORDER — TRIAMCINOLONE ACETONIDE 1 MG/G
OINTMENT TOPICAL ONCE
Status: CANCELLED | OUTPATIENT
Start: 2024-08-09 | End: 2024-08-09

## 2024-08-09 RX ORDER — TRAMADOL HYDROCHLORIDE 50 MG/1
TABLET ORAL
COMMUNITY
Start: 2024-07-10

## 2024-08-09 NOTE — DISCHARGE INSTRUCTIONS
Return Appointment:   Discharge from wound center at this time        Instructions:   Left Knee:  Wound is healed!  Apply Vaseline to healed scar daily for the next 1-2 months  Patient may shower without dressing as previously  Wear compression stockings bilaterally daily     Follow up with Infectious Disease 09/23/2024 as scheduled  Follow up with  10/10/2024 as scheduled

## 2024-08-09 NOTE — FLOWSHEET NOTE
08/09/24 1328   Wound 04/03/24 Knee Left;Anterior #1   Date First Assessed/Time First Assessed: 04/03/24 1129   Present on Original Admission: Yes  Wound Approximate Age at First Assessment (Weeks): 3.5 weeks  Primary Wound Type: (c) Diabetic Ulcer  Location: Knee  Wound Location Orientation: Left;Anterio...   Wound Image    Wound Etiology Diabetic Lee 2   Dressing Status Dry   Wound Cleansed Cleansed with saline   Dressing/Treatment Open to air   Wound Length (cm) 0 cm   Wound Width (cm) 0 cm   Wound Depth (cm) 0 cm   Wound Surface Area (cm^2) 0 cm^2   Change in Wound Size % (l*w) 100   Wound Volume (cm^3) 0 cm^3   Wound Healing % 100   Wound Assessment Epithelialization   Drainage Amount None (dry)   Odor None   Maria M-wound Assessment Intact

## 2024-08-09 NOTE — WOUND CARE
Discharge Instructions for  [facility]  [address] [place] [address]  Phone [phone]   Fax [phone]      NAME:  Gilbert Yo  YOB: 1960  MEDICAL RECORD NUMBER:  [id number]  DATE:  8/9/2024    Return Appointment:   Discharge from wound center at this time      Instructions:   Left Knee:  Wound is healed!  Apply Vaseline to healed scar daily for the next 1-2 months  Patient may shower without dressing as previously  Wear compression stockings bilaterally daily    Follow up with Infectious Disease 09/23/2024 as scheduled  Follow up with  10/10/2024 as scheduled    Should you experience increased redness, swelling, pain, foul odor, size of wound(s), or have a temperature over 101 degrees please contact the Wound Healing Center at [phone] or if after hours contact your primary care physician or go to the hospital emergency department.    PLEASE NOTE: IF YOU ARE UNABLE TO OBTAIN WOUND SUPPLIES, CONTINUE TO USE THE SUPPLIES YOU HAVE AVAILABLE UNTIL YOU ARE ABLE TO REACH US. IT IS MOST IMPORTANT TO KEEP THE WOUND COVERED AT ALL TIMES.    Electronically signed Kelly Kendall PT, WCC on 8/9/2024 at 1:32 PM

## 2024-10-10 ENCOUNTER — OFFICE VISIT (OUTPATIENT)
Dept: ORTHOPEDIC SURGERY | Age: 64
End: 2024-10-10
Payer: COMMERCIAL

## 2024-10-10 DIAGNOSIS — Z96.652 STATUS POST LEFT KNEE REPLACEMENT: Primary | ICD-10-CM

## 2024-10-10 PROCEDURE — 99213 OFFICE O/P EST LOW 20 MIN: CPT | Performed by: ORTHOPAEDIC SURGERY

## 2024-10-10 NOTE — PROGRESS NOTES
Post-op TKA Visit      10/10/24     No Known Allergies  Current Outpatient Medications on File Prior to Visit   Medication Sig Dispense Refill    buPROPion (WELLBUTRIN XL) 150 MG extended release tablet       traMADol (ULTRAM) 50 MG tablet       furosemide (LASIX) 20 MG tablet Take 1 tablet by mouth 2 times daily for 10 days 20 tablet 0    cefadroxil (DURICEF) 500 MG capsule Take 2 capsules by mouth 2 times daily      zolpidem (AMBIEN) 10 MG tablet TAKE ONE TABLET BY MOUTH AT BEDTIME AS NEEDED FOR INSOMNIA      busPIRone (BUSPAR) 10 MG tablet Take 10 mg by mouth nightly. Take one to two tablets at night for sleep      QUEtiapine (SEROQUEL) 50 MG tablet Take 50 mg by mouth nightly. take one at night      irbesartan (AVAPRO) 300 MG tablet Take 1 tablet by mouth daily      celecoxib (CELEBREX) 200 MG capsule Take 200 mg by mouth 2 times daily.      vitamin D (VITAMIN D3) 125 MCG (5000 UT) CAPS capsule Take 1 capsule by mouth daily.      aspirin (ASPIRIN 81) 81 MG EC tablet Take 1 tablet by mouth in the morning and at bedtime 70 tablet 0    VRAYLAR 1.5 MG capsule Take 1 capsule by mouth daily      gabapentin (NEURONTIN) 300 MG capsule Take 1 capsule by mouth daily.      metFORMIN (GLUCOPHAGE-XR) 500 MG extended release tablet Take 1 tablet by mouth 2 times daily      ALPRAZolam (XANAX) 1 MG tablet Take 1 tablet by mouth in the morning and at bedtime. Takes 2 in morning and 1 at bedtime      atorvastatin (LIPITOR) 20 MG tablet Take 1 tablet by mouth daily      escitalopram (LEXAPRO) 20 MG tablet Take 1 tablet by mouth daily       No current facility-administered medications on file prior to visit.        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    He has been seen by wound therapy for the central portion of the incision which is gradually healing.  He has completed IV antibiotics and is currently on oral antibiotics.   Their 
physical therapy and resumed most of their normal activities.   They are pleased with their results thus far. Denies any new complaints today.     Physical Exam:  The patient's incision is well healed. There is mild swelling and minimal increased warmth. Good range of motion of the knee with minimal pain. There is no M/L or A/P instability. The calf is soft and non-tender. Neurologic and vascular exams are intact.    X-Rays: AP, Lateral, and sunrise views of the left knee reveals a cemented TKA, Arvonia prosthesis. There is patella arthroplasty with slight lateral tilt appreciate on sunrise view. There is good alignment and good position of the components.    X-Ray Diagnosis: Satisfactory appearance left TKA    Disposition: The patient is doing well following revision left TKA.   They will continue physical therapy exercises and normal activity as tolerated. The patient was advised about fall precautions and dental prophylaxis. They will follow up as scheduled in 6 months or sooner if needed.

## 2024-12-07 NOTE — PROGRESS NOTES
[facility] CARDIOLOGY Follow Up                 Reason for Visit: Follow-up testing    Subjective:     Patient is a 64 y.o. male with a PMH of aortic stenosis, hypertension and hyperlipidemia who presents for follow-up.  The patient was last seen in February 2023.  A TTE was ordered for a cardiac murmur.  He was noted to have a normal EF.  The results were forwarded to his PCP in the setting of AV sclerosis, recommending surveillance TTE in a couple years since no TTE measurements were consistent with stenosis at the time.  He had a repeat TTE in July 2024 that was noted to demonstrate a normal EF.  He had an MG of 22 mmHg, peak velocity of 3.4 m/s, and VTI ratio of 0.46.  The patient denies angina and dyspnea.  He denies syncope.      Past Medical History:   Diagnosis Date    Anemia     Anxiety and depression     managed with medication; followed by psych    Arthritis     Awareness under anesthesia 1980s    with umb hernia surg    Back pain     BPH (benign prostatic hyperplasia)     Bulging lumbar disc     L4-L5    ED (erectile dysfunction)     Essential hypertension, benign     controlled with med    H/O colonoscopy 2018    due in 2027    High cholesterol     managed with medication    History of anemia     History of echocardiogram 02/14/2023    Left Ventricle: Normal left ventricular systolic function with a est EF 55- 60%. Left ventricle size is nml. Mildly increased wall thickness. Nml wall motion. Abn diastolic function. Aortic Valve: Mild sclerosis of aortic valve cusp. Mild stenosis of aortic valve. AV mean gradient 9 mmHg.Mild sclerosis of aortic valve cusp.No regurgitation. Mild stenosis of aortic valve. AV mean gradient  9 mmHg    Insomnia     Morbid obesity 5/31/16    BMI- 40 (verbal)    Murmur, cardiac     2 out of 6 systolic murmur heard over the right upper sternal border    OAB (overactive bladder)     Other and unspecified hyperlipidemia     Prediabetes     Metformin for prevention    Psychiatric

## 2024-12-09 ENCOUNTER — OFFICE VISIT (OUTPATIENT)
Age: 64
End: 2024-12-09
Payer: COMMERCIAL

## 2024-12-09 VITALS
DIASTOLIC BLOOD PRESSURE: 60 MMHG | BODY MASS INDEX: 45.71 KG/M2 | WEIGHT: 315 LBS | HEART RATE: 76 BPM | SYSTOLIC BLOOD PRESSURE: 110 MMHG

## 2024-12-09 DIAGNOSIS — I35.0 MODERATE AORTIC STENOSIS BY PRIOR ECHOCARDIOGRAM: Primary | ICD-10-CM

## 2024-12-09 DIAGNOSIS — E66.01 MORBID OBESITY WITH BMI OF 45.0-49.9, ADULT: ICD-10-CM

## 2024-12-09 DIAGNOSIS — I10 HYPERTENSION, UNSPECIFIED TYPE: ICD-10-CM

## 2024-12-09 DIAGNOSIS — E78.5 HYPERLIPIDEMIA, UNSPECIFIED HYPERLIPIDEMIA TYPE: ICD-10-CM

## 2024-12-09 PROCEDURE — 3078F DIAST BP <80 MM HG: CPT | Performed by: INTERNAL MEDICINE

## 2024-12-09 PROCEDURE — 3074F SYST BP LT 130 MM HG: CPT | Performed by: INTERNAL MEDICINE

## 2024-12-09 PROCEDURE — 99214 OFFICE O/P EST MOD 30 MIN: CPT | Performed by: INTERNAL MEDICINE

## 2025-04-10 ENCOUNTER — OFFICE VISIT (OUTPATIENT)
Dept: ORTHOPEDIC SURGERY | Age: 65
End: 2025-04-10
Payer: COMMERCIAL

## 2025-04-10 DIAGNOSIS — Z96.652 STATUS POST LEFT KNEE REPLACEMENT: Primary | ICD-10-CM

## 2025-04-10 PROCEDURE — 99213 OFFICE O/P EST LOW 20 MIN: CPT | Performed by: PHYSICIAN ASSISTANT

## 2025-04-10 PROCEDURE — 1123F ACP DISCUSS/DSCN MKR DOCD: CPT | Performed by: PHYSICIAN ASSISTANT

## 2025-04-10 NOTE — PROGRESS NOTES
Post-op TKA Visit      04/10/25     No Known Allergies  Current Outpatient Medications on File Prior to Visit   Medication Sig Dispense Refill    buPROPion (WELLBUTRIN XL) 150 MG extended release tablet       furosemide (LASIX) 20 MG tablet Take 1 tablet by mouth 2 times daily for 10 days 20 tablet 0    cefadroxil (DURICEF) 500 MG capsule Take 2 capsules by mouth 2 times daily      busPIRone (BUSPAR) 10 MG tablet Take 1 tablet by mouth nightly Take one to two tablets at night for sleep      QUEtiapine (SEROQUEL) 50 MG tablet Take 1 tablet by mouth nightly take one at night      irbesartan (AVAPRO) 300 MG tablet Take 1 tablet by mouth daily      celecoxib (CELEBREX) 200 MG capsule Take 1 capsule by mouth 2 times daily      vitamin D (VITAMIN D3) 125 MCG (5000 UT) CAPS capsule Take 1 capsule by mouth daily      aspirin (ASPIRIN 81) 81 MG EC tablet Take 1 tablet by mouth in the morning and at bedtime 70 tablet 0    VRAYLAR 1.5 MG capsule Take 1 capsule by mouth daily      gabapentin (NEURONTIN) 300 MG capsule Take 1 capsule by mouth daily.      metFORMIN (GLUCOPHAGE-XR) 500 MG extended release tablet Take 1 tablet by mouth 2 times daily      ALPRAZolam (XANAX) 1 MG tablet Take 1 tablet by mouth in the morning and at bedtime. Takes 2 in morning and 1 at bedtime      atorvastatin (LIPITOR) 20 MG tablet Take 1 tablet by mouth daily      escitalopram (LEXAPRO) 20 MG tablet Take 1 tablet by mouth daily       No current facility-administered medications on file prior to visit.        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    Their pain is improving. They have completed physical therapy and resumed most of their normal activities.   They are pleased with their results thus far. Denies any new complaints today.     Physical Exam:  The patient's incision is well healed. There is mild swelling and minimal increased warmth. Good

## 2025-06-09 NOTE — PROGRESS NOTES
soft, non-tender, non-distended  Ext:   No sig LE edema bilaterally  Skin:  warm and dry, no obvious rashes seen  Neuro:   no obvious sensory or motor deficits  Psychiatric:   normal mood and affect    Data Review:   Lab Results   Component Value Date    CHOL 226 (H) 06/22/2020     Lab Results   Component Value Date    TRIG 162 (H) 06/22/2020     Lab Results   Component Value Date    HDL 45 06/22/2020     No components found for: \"LDLCHOLESTEROL\", \"LDLCALC\"  Lab Results   Component Value Date    VLDL 32 06/22/2020     No results found for: \"CHOLHDLRATIO\"     Lab Results   Component Value Date/Time     05/15/2024 03:57 PM     03/12/2024 03:29 AM     03/11/2024 03:36 AM    K 4.0 05/15/2024 03:57 PM    K 4.0 03/12/2024 03:29 AM    K 4.3 03/11/2024 03:36 AM     05/15/2024 03:57 PM     03/12/2024 03:29 AM     03/11/2024 03:36 AM    CO2 23 05/15/2024 03:57 PM    CO2 25 03/12/2024 03:29 AM    CO2 28 03/11/2024 03:36 AM    BUN 24 05/15/2024 03:57 PM    BUN 23 03/12/2024 03:29 AM    BUN 27 03/11/2024 03:36 AM    CREATININE 1.19 05/15/2024 03:57 PM    CREATININE 0.94 04/15/2024 10:10 AM    CREATININE 1.04 04/08/2024 12:23 PM    GLUCOSE 89 05/15/2024 03:57 PM    GLUCOSE 105 03/12/2024 03:29 AM    GLUCOSE 96 03/11/2024 03:36 AM    CALCIUM 8.8 05/15/2024 03:57 PM    CALCIUM 9.2 03/12/2024 03:29 AM    CALCIUM 9.1 03/11/2024 03:36 AM         Lab Results   Component Value Date    ALT 26 05/15/2024    ALT 20 02/25/2024    ALT 45 (H) 06/22/2020    AST 26 05/15/2024    AST 14 (L) 02/25/2024    AST 25 06/22/2020        Assessment/Plan:   1. Moderate aortic stenosis by prior echocardiogram  - Obtain a surveillance TTE in 6 months    2. Hypertension, unspecified type  - Well-controlled  - Currently on irbesartan    3. Hyperlipidemia, unspecified hyperlipidemia type  - Continue with Lipitor     F/U: 6 months    Dwaine Ray MD   Detail Level: Detailed Depth Of Biopsy: dermis Was A Bandage Applied: Yes Size Of Lesion In Cm: 0 Anticipated Plan (Based On Presumed Biopsy Results): He has the skin check in one month. If he needs Mohs, cancel that skin check appointment and just do it the day of Mohs Biopsy Type: H and E Biopsy Method: Dermablade Anesthesia Type: 1% lidocaine with epinephrine Anesthesia Volume In Cc: 1 Hemostasis: Electrocautery Wound Care: Petrolatum Dressing: bandage Destruction After The Procedure: No Type Of Destruction Used: Curettage Curettage Text: The wound bed was treated with curettage after the biopsy was performed. Cryotherapy Text: The wound bed was treated with cryotherapy after the biopsy was performed. Electrodesiccation Text: The wound bed was treated with electrodesiccation after the biopsy was performed. Electrodesiccation And Curettage Text: The wound bed was treated with electrodesiccation and curettage after the biopsy was performed. Silver Nitrate Text: The wound bed was treated with silver nitrate after the biopsy was performed. Lab: 212 Medical Necessity Information: It is in your best interest to select a reason for this procedure from the list below. All of these items fulfill various CMS LCD requirements except the new and changing color options. Consent: Written consent was obtained and risks were reviewed including but not limited to scarring, infection, bleeding, scabbing, incomplete removal, nerve damage and allergy to anesthesia. Post-Care Instructions: I reviewed with the patient in detail post-care instructions. Patient is to keep the biopsy site dry overnight, and then apply bacitracin twice daily until healed. Patient may apply hydrogen peroxide soaks to remove any crusting. Notification Instructions: Patient will be notified of biopsy results. However, patient instructed to call the office if not contacted within 2 weeks. Billing Type: Third-Party Bill Information: Selecting Yes will display possible errors in your note based on the variables you have selected. This validation is only offered as a suggestion for you. PLEASE NOTE THAT THE VALIDATION TEXT WILL BE REMOVED WHEN YOU FINALIZE YOUR NOTE. IF YOU WANT TO FAX A PRELIMINARY NOTE YOU WILL NEED TO TOGGLE THIS TO 'NO' IF YOU DO NOT WANT IT IN YOUR FAXED NOTE.

## 2025-06-11 ENCOUNTER — OFFICE VISIT (OUTPATIENT)
Age: 65
End: 2025-06-11
Payer: COMMERCIAL

## 2025-06-11 VITALS
SYSTOLIC BLOOD PRESSURE: 110 MMHG | HEART RATE: 80 BPM | DIASTOLIC BLOOD PRESSURE: 62 MMHG | WEIGHT: 315 LBS | HEIGHT: 74 IN | BODY MASS INDEX: 40.43 KG/M2

## 2025-06-11 DIAGNOSIS — I35.0 MODERATE AORTIC STENOSIS BY PRIOR ECHOCARDIOGRAM: Primary | ICD-10-CM

## 2025-06-11 DIAGNOSIS — I10 HYPERTENSION, UNSPECIFIED TYPE: ICD-10-CM

## 2025-06-11 DIAGNOSIS — E78.5 HYPERLIPIDEMIA, UNSPECIFIED HYPERLIPIDEMIA TYPE: ICD-10-CM

## 2025-06-11 PROCEDURE — 99214 OFFICE O/P EST MOD 30 MIN: CPT | Performed by: INTERNAL MEDICINE

## 2025-06-11 PROCEDURE — 1036F TOBACCO NON-USER: CPT | Performed by: INTERNAL MEDICINE

## 2025-06-11 PROCEDURE — G8417 CALC BMI ABV UP PARAM F/U: HCPCS | Performed by: INTERNAL MEDICINE

## 2025-06-11 PROCEDURE — 1123F ACP DISCUSS/DSCN MKR DOCD: CPT | Performed by: INTERNAL MEDICINE

## 2025-06-11 PROCEDURE — 3074F SYST BP LT 130 MM HG: CPT | Performed by: INTERNAL MEDICINE

## 2025-06-11 PROCEDURE — 3078F DIAST BP <80 MM HG: CPT | Performed by: INTERNAL MEDICINE

## 2025-06-11 PROCEDURE — 3017F COLORECTAL CA SCREEN DOC REV: CPT | Performed by: INTERNAL MEDICINE

## 2025-06-11 PROCEDURE — G8427 DOCREV CUR MEDS BY ELIG CLIN: HCPCS | Performed by: INTERNAL MEDICINE

## (undated) DEVICE — TIBURON EXTREMITY SHEET: Brand: CONVERTORS

## (undated) DEVICE — SUTURE STRATAFIX SPRL MCRYL + SZ 2-0 L18IN ABSRB UD CT-1 SXMP1B413

## (undated) DEVICE — GLOVE ORANGE PI 8   MSG9080

## (undated) DEVICE — DRAPE,TOP,102X53,STERILE: Brand: MEDLINE

## (undated) DEVICE — CURETTE SURG FOR BNE CEM REM QUIK USE

## (undated) DEVICE — DRAPE TWL SURG 16X26IN BLU ORB04] ALLCARE INC]

## (undated) DEVICE — STOCKINETTE,DOUBLE PLY,6X48,STERILE: Brand: MEDLINE

## (undated) DEVICE — CARBIDE ROUND

## (undated) DEVICE — SPLINT KNEE UNIV FOR LESS THAN 36IN L20IN FOAM LAM E CNTCT

## (undated) DEVICE — GLOVE SURG SZ 65 THK91MIL LTX FREE SYN POLYISOPRENE

## (undated) DEVICE — GLOVE SURG SZ 8 L12IN FNGR THK79MIL GRN LTX FREE

## (undated) DEVICE — COVER,TABLE,HEAVY DUTY,79"X110",STRL: Brand: MEDLINE

## (undated) DEVICE — SUTURE ABS ANTIBACT 1-0 CTX 24IN STRATAFIX PDS+ SXPP1A445

## (undated) DEVICE — BASIC SINGLE BASIN-LF: Brand: MEDLINE INDUSTRIES, INC.

## (undated) DEVICE — CANISTER NEG PRSS 500ML WND THER W/ TBNG NO PRSS RANG W/

## (undated) DEVICE — X-LARGE COTTON GLOVE: Brand: DEROYAL

## (undated) DEVICE — SUTURE PROL SZ 0 L30IN NONABSORBABLE BLU FSLX L36MM 3/8 CIR 8690H

## (undated) DEVICE — BLADE SURG SAW STD S STL OSC W/ SERR EDGE DISP

## (undated) DEVICE — DRESSING HYDROFIBER AQUACEL AG ADVANTAGE 3.5X14 IN

## (undated) DEVICE — SOLUTION IRRIG 3000ML 0.9% SOD CHL USP UROMATIC PLAS CONT

## (undated) DEVICE — GLOVE SURG SZ 7 L11.33IN FNGR THK9.8MIL STRW LTX POLYMER

## (undated) DEVICE — SOLUTION IRRIG 1000ML STRL H2O USP PLAS POUR BTL

## (undated) DEVICE — HANDPIECE SET WITH COAXIAL HIGH FLOW TIP AND SUCTION TUBE: Brand: INTERPULSE

## (undated) DEVICE — BIPOLAR SEALER 23-112-1 AQM 6.0: Brand: AQUAMANTYS ®

## (undated) DEVICE — 450 ML BOTTLE OF 0.05% CHLORHEXIDINE GLUCONATE IN 99.95% STERILE WATER FOR IRRIGATION, USP AND APPLICATOR.: Brand: IRRISEPT ANTIMICROBIAL WOUND LAVAGE

## (undated) DEVICE — OSCILLATING TIP SAW CARTRIDGE: Brand: STRYKER PRECISION

## (undated) DEVICE — STERILE PVP: Brand: MEDLINE INDUSTRIES, INC.

## (undated) DEVICE — SOLUTION IV 250ML 0.9% SOD CHL PH 5 INJ USP VIAFLX PLAS

## (undated) DEVICE — PENCIL ES L3M BTTN SWCH HOLSTER W/ BLDE ELECTRD EDGE

## (undated) DEVICE — YANKAUER,FLEXIBLE HANDLE,REGLR CAPACITY: Brand: MEDLINE INDUSTRIES, INC.

## (undated) DEVICE — SWAB SPEC COLLCTN 6 IN SINGLE RAYON TIP STRL STARSWAB II

## (undated) DEVICE — TAPERED ROUTER

## (undated) DEVICE — KIT TRK KNEE PROC VIZADISC

## (undated) DEVICE — BLADE RMR L51MM PAT PILOT H

## (undated) DEVICE — TOTAL KNEE DR JENNINGS: Brand: MEDLINE INDUSTRIES, INC.

## (undated) DEVICE — 3M™ IOBAN™ 2 ANTIMICROBIAL INCISE DRAPE 6650EZ: Brand: IOBAN™ 2

## (undated) DEVICE — SUTURE ETHBND EXCEL SZ 2 L30IN NONABSORBABLE GRN L75MM LR X496T

## (undated) DEVICE — 3M™ STERI-DRAPE™ INSTRUMENT POUCH 1018: Brand: STERI-DRAPE™

## (undated) DEVICE — 1840 FOAM BLOCK NEEDLE COUNTER: Brand: DEVON

## (undated) DEVICE — SUTURE VCRL SZ 1 L36IN ABSRB UD L36MM CT-1 1/2 CIR J947H

## (undated) DEVICE — GLOVE ORANGE PI 7   MSG9070

## (undated) DEVICE — SUTURE PDS II SZ 1 L96IN ABSRB VLT TP-1 L65MM 1/2 CIR Z880G

## (undated) DEVICE — KIT DRP FOR RIO ROBOTIC ARM ASST SYS

## (undated) DEVICE — STERILE PRESSURE PROTECTOR PAD® FOR DE MAYO UNIVERSAL DISTRACTOR® (10/CASE): Brand: DE MAYO UNIVERSAL DISTRACTOR®

## (undated) DEVICE — SPONGE LAPAROTOMY W18XL18IN WHITE STRUNG RADIOPAQUE STERILE

## (undated) DEVICE — SOLUTION IRRIG 1000ML 0.9% SOD CHL USP POUR PLAS BTL

## (undated) DEVICE — HOOD: Brand: T7PLUS

## (undated) DEVICE — SYRINGE MED 50ML LUERLOCK TIP

## (undated) DEVICE — BLADE SAW W12.5XL70MM THK0.8MM CUT THK1.12MM S STL RECIP

## (undated) DEVICE — COVER,MAYO STAND,STERILE: Brand: MEDLINE

## (undated) DEVICE — ZIMMER® STERILE DISPOSABLE TOURNIQUET CUFF WITH PLC, DUAL PORT, SINGLE BLADDER, 42 IN. (107 CM)

## (undated) DEVICE — DRESSING NEG PRSS L W15XH3.3XL26CM BLK POLYUR DRP PD

## (undated) DEVICE — PIN BNE FIX TEMP L140MM DIA4MM MAKO

## (undated) DEVICE — DRESSING PETRO W3XL8IN OIL EMUL N ADH GZ KNIT IMPREG CELOS

## (undated) DEVICE — GLOVE SURG SZ 75 L12IN FNGR THK79MIL GRN LTX FREE

## (undated) DEVICE — SHEET,DRAPE,53X77,STERILE: Brand: MEDLINE

## (undated) DEVICE — SUTURE MCRYL SZ 3-0 L27IN ABSRB UD L24MM PS-1 3/8 CIR PRIM Y936H

## (undated) DEVICE — PRECISION THIN (9.0 X 0.38 X 31.0MM)

## (undated) DEVICE — DUAL CUT SAGITTAL BLADE

## (undated) DEVICE — KIT INT FIX FEM TIB CKPT MAKOPLASTY

## (undated) DEVICE — CONTAINER,SPECIMEN,O.R.STRL,4.5OZ: Brand: MEDLINE

## (undated) DEVICE — BAG WND LAV 1L CLR ETH ACET ACID SOD ACETT BENZALKONIUM CHL

## (undated) DEVICE — Device: Brand: STABLECUT®

## (undated) DEVICE — PIN BNE FIX TEMP L110MM DIA4MM MAKO

## (undated) DEVICE — OSCILLATING TIP SAW CARTRIDGE: Brand: PRECISION FALCON